# Patient Record
Sex: FEMALE | Race: WHITE | Employment: OTHER | URBAN - METROPOLITAN AREA
[De-identification: names, ages, dates, MRNs, and addresses within clinical notes are randomized per-mention and may not be internally consistent; named-entity substitution may affect disease eponyms.]

---

## 2021-06-14 NOTE — CONSULTS
TREATMENT LOCATION:   [] [facility] [place] [address]: [phone]  F: [phone] [x] [address]: [phone]  F: [phone]      Lymphedema Services - Initial Evaluation for Upper Extremity    Date:  2021  Patient: Susan Avendaño  : 1960             MRN: [id number]  Referring Physician: Lenin Garcia MD       Phone: [phone]  Fax: [phone]  Insurance:  Dominique Irma - 60 visits per calendar year she has 62 left combined with SLP/PT no copay, no auth required. Medical Diagnosis: Postmastectomy lymphedema syndrome  Rehab Codes: I89.0  Onset Date: 2021  Visit# / total visits: 1/10    Allergies:  [x] None    Medications: See charted information in Epic  Past Medical History: See charted information in Epic     Restrictions: No blood pressure/blood draw in: L U E  Fall Risk:   [x] No    [] Yes   If yes, intervention:       Overview: Patient is a 64year old female referred to the Lymphedema Clinic with a diagnosis of left Upper Extremity Lymphedema. Pt underwent mastectomy in  with 23 lymphnodes removed to the L UE. She states aprox 4 years ago her hand started to swell and she attended the [facility] lymphedema clinic. She states that she was educated on MLD and was fitted for a compression arm sleeve/ vest and glove from [employer]. It took aprox 1 year to get this fitted properly, however, it to still not fit right so she states she did not end up wearing it. She did have a compression arm sleeve that she would wear in the meantime. She was also set up with the CircAid reduction kit. She states that after this the arm was pretty close back to normal, however she got discouraged and did not wear the items as she should have and the arm increased back in size. She attempted to do therapy a second time at [facility] of which they review the MLD again, aprox 2 years ago.  She was sent up with a different compression velcro device - the Sigvaris compre Arm piece, and continued to order arm sleeves off of lymphedema products. Pt also seen a lymphedema therapist out in [place], [place] as well as Dr. Kaycee Ozuna as well as Dr. Doretha Quinones in [facility]- both of which recommended liposuction of the L UE to remove the solids and then LVA re-routing surgery of the lymph/ vascular system. Dr. Kaycee Ozuna - would like the patient to do lymphedema therapy and get the arm down as small as possible before having surgery. Pt arrives with stage 3 lymphedema of the L UE stating it has been present since. Did have lateral checked out in [place], of which they determined no edema at this area. Bear Harea Karon - 3 months- to tight at the wrist   Juzo - 7 months old - rolls down and digs down at the top, but fits good at the wrist   [address] - several options with the last being arox 4 months ago, feel that the circumscription is cute off at writs. PHYSICAL THERAPY NOTE- 10/21/2019 - Jim Sun is being referred to Physical Therapy for treatment of left upper extremity lymphedema. Patient reports the lymphedema has been present since 2017, with no specific injury to cause the swelling. Patient has a history of left breast cancer with mastectomy and ALND in August 2011. She states that use of a compression pump improves the lymphedema of the left arm. Rock Asa states she wears a compression sleeve during the day, but has trouble with wearing the compression on the hand while at work. She wears a sleeve on her left arm at night and reports she has increased hand swelling. Patient reports complete decongestive therapy with a lymphedema specialist has been done in the past to treat her swelling. She states she has not previously learned lymphatic drainage exercises.   [facility] reports she tried a ready-to-wear flat knit compression sleeve, but it was too tight at the wrist and she struggled to don the sleeve. Patient is right hand dominant. PLAN: Bandaging, Adjust Exercises, Long Term - talk different compression garments. Hx of Complete Decongestive Therapy:[x]Yes - Date: 2017, 2019       []No   Rate of onset:   [x] Slow   [] Rapid     [] Acute   Progression: [] Improving   []  Worsening  [x] Consistent   Conservative Treatments Utilized in the Past:  [] None  [x] Compression Garments   [x] Bandaging  [x] Elevation   [] Exercise  [x]Self MLD  [] Other/comments:      Current Lymphedmea Treatments:   [] None  [x] Compression Garments   [] Bandaging  [] Elevation  [] Exercise   []Self MLD  [] Other/comments:          Aggravating factors:  [] None   [x] Activity  [] Stress  [] Sleep Position [] Travel [x] Hot Temperatures [] Diet   []  Other/comments:    Relieving factors:   [] None [] Elevation  [] Activity  [x] Compression  [] Rest  [] Cold Temperatures [] Diet   []  Other/comments:    Comments:      Pain:  [] YES    [x] NO   Location:      Pain Rating: ( 0-10 scale) : 0/10  Comments:     History of Cancer: [x]Yes []No   Location:  L breast              Date of Diagnosis: 11/ 2011  Currently undergoing treatments at evaluation: []Yes  [x]No    Surgery: Yes   Node Dissection: Yes, # removed: 23   Chemotherapy: Yes, Completed   Radiation: Yes, Completed   Hormone Therapy: [x]Yes  []No    If so, tamoxifen?: [x]  - 5 years, 3 years Arimidex   Cancer Related Fatigue: mild   Is dominant extremity affected?  [x]Yes  []No  [] N/A     Absolute Lymphedema Contraindications - treatement [x] NONE    Absolute Contraindications Regarding the Deep Abdomen: [x] NONE   Relative Lymphedema Contraindications [x] NONE       ADL/IADL Previous level of function Current level of function Who currently assists the patient with task   Bathing  [x] Independent  [] Assist [x] Independent  [] Assist    Dress/grooming [x] Independent  [] Assist [x] Independent  [] Assist    Transfer/mobility [x] educates on an impaired lymphatic system vs. a healthy lymphatic system and how it relates to swelling and skin integrity. Pt is also educated, in more detail, on the purpose of lymphedema treatments and a POC that would be of benefit to them. This may include:     [x]Bandaging   [x]Self-Bandaging/Caregiver Training   [x]MLD    [x]Self-MLD   [x] Therapeutic Exercise    [x] Education/Instruction in home management program  [] Education and trial of a Vasopneumatic Pump    [x] Education and transition to long term management devices such as velcro compression garments and/or daytime compression sleeve/stocking(s)   [x]Discharge to Home Program     Response to treatment: Pt verbalizes and is agreeable to the instructions/ POC established at today's evaluation. Lymphedema Stage per ISL Guidelines    [] 0: Subclinical with no evidence on physical exam  [] 1:  Early onset, swelling/heaviness, pitting edema, subsides with limb elevation  [x] 2:  More advanced, fibrosis resulting in non-pitting edema, does not respond to elevation, thickening of the tissues  [] 3: Elephantiasis, pitting absent, huge limbs with dry/scaly, papillomatosis, hyperkeratosis, fluid may be leaking with recurrent cellulitis. Acanthosis (deep body folds). Elevation &   diuretics ineffective. Therapy Goals  STG - To be addressed within 3 visits    Pt will demonstrate compliance of maintaining lymphedema precautions to reduce the risks of infection and further exacerbations. Pt will demonstrate independence with decongestive exercise program in order to expedite fluid rerouting. LTG To be adressed within 8 visits     Pt will demonstrate competence with SELF-MLD ( manual lymphatic drainage) in order to reroute lymphatic pathways for decreased swelling. Pt/Caregiver will demonstrate independence with donning/doffing and wearing schedule for compression garments/ devices to maintain decreased size upon discharge.     Pt will demonstrate compliance with skin care routine for improved overall skin integrity and decreased risk of wounds and infection. Pt to compliant with CDT in order to reduce edema in the L UE by 3+ cm. Pt will demonstrate independence with after breast surgery stretches (corner stretch, butterfly stretch, side arm stretch) in order to increase ease with AROM. Patient's Goal: \" I would like to get my arm down in size, find something that works and hopefully get this surgery. \"     Response to Education Provided:  [x] Verbalized understanding   [] Demonstrates/verbalizes HEP/Education previously given      [] Needs continued review            [] No understanding   Learner(s): [x] Patient  [] Spouse [] Family  [] Other:   Method(s): [x] Verbal [] Demonstration [x] Handouts [] Exercise booklet   Family available to assist with home program if needed: [] Yes [x] No    FREQUENCY: Pt will be seen 2 x/ week for 9 visits and will follow up as appropriate. Focus is to remain on short and long term goals listed above. Rehabilitation Potential/Commitment: [x] Good [] Fair     [] Poor    Functional Assessment Used: Lymphedema Life Impact Scale (LLIS) Score: [x] Eval 34  %   [] 10th visit____  [] D/C ____     Clearance needed:  []Yes    [x]No     Physicians/specialties giving clearance:    Referral needed to: [] Physical Therapy   [] Speech Therapy                 Treatment Charges   Minutes   Units   Evaluation (09351)                                                $95.15 / $75.40            Low            Moderate 45 1          High     Manual Therapy (25982):                                      $26.92 / $21.34     Therapeutic activities (79552):                             $37.46/ $26.79     Therapeutic Exercise (72356)                              $29.21/$ 22.84     Self care/home mgmt (81711)                              $32.39 / $24.43 15 1   Other:      Total Treatment Time    60 2         Time In:  10:00  Time

## 2021-06-15 ENCOUNTER — HOSPITAL ENCOUNTER (OUTPATIENT)
Dept: OCCUPATIONAL THERAPY | Age: 61
Setting detail: THERAPIES SERIES
Discharge: HOME | End: 2021-06-15
Payer: COMMERCIAL

## 2021-06-15 PROCEDURE — 97535 SELF CARE MNGMENT TRAINING: CPT

## 2021-06-15 PROCEDURE — 97166 OT EVAL MOD COMPLEX 45 MIN: CPT

## 2021-06-29 ENCOUNTER — HOSPITAL ENCOUNTER (OUTPATIENT)
Dept: OCCUPATIONAL THERAPY | Age: 61
Setting detail: THERAPIES SERIES
Discharge: HOME | End: 2021-06-29
Payer: COMMERCIAL

## 2021-06-29 PROCEDURE — 97140 MANUAL THERAPY 1/> REGIONS: CPT

## 2021-06-29 PROCEDURE — 97535 SELF CARE MNGMENT TRAINING: CPT

## 2021-06-29 NOTE — FLOWSHEET NOTE
D2         D3        D4        D5          Dorsum   11 20.0 21.0   Wrist   16 16.4 19.5   Lower Forearm  25 19.3 25.4   Upper Forearm  34 25.5 32.0   Olecranon   40 25.4 30.0   Lower Bicep  48 29.0 33.5   Upper Bicep  57 32.0 34.5   Initial Total 6/15/2021:   167.6 205.4       Assessment :  [x] Progressing toward goals. Pt arrives to today's treatment stating that she has not noticed much of a change. Her arm continue to feel heavy and swollen even though she attempted to wear the Velcro compression pieces on occasion. Pt is educated on the following hand out and voices good understanding on how to complete:     Self-MLD Education     What is MLD?: MLD (or Manual Lymph Drainage) engages healthy lymph nodes & vessels in the body to create a suctioning effect to draw fluid away from full/affected areas. Additionally, we are teaching your lymphatic system to reroute fluid to healthy lymph vessels, which then drain into the venous system. Tips:   Stationary Circles: light pressure in working phase, hand should relax completely in resting phase (let your skin move your hand back to the original position at the beginning of the stretch). These are \"C\" shaped stretches and they should be completed SLOWLY. Please do not rush or MLD is not effective. Pressure applied = the amount of pressure you should apply when stroking a s head. Circles should be large enough to stretch the skin, but NOT slide over the skin. No rubbing or petting the skin. It is recommended that you complete your self-MLD 2x per day. You are welcome to complete more if you'd like. Skin on skin contact is best to achieve best stretch. It may be easiest for you to complete self-MLD while you are in bed or reclined. Therefore, you may want to complete in the AM after you wake up and in the PM before you go to bed. Self-MLD Pre-Treatment for Upper Extremity Lymphedema     1.  Stationary circles to clavicular lymph nodes x10 - located just above your collar bone      2. Deep breathing x5 - belly expands as you breathe in and deflates as you breathe out. Breathe in through the nose, out through the mouth. 3. Stationary circles over both inguinal lymph nodes x10 - located at the groin in the crease where your hip bends. 4. Stationary circles over  left axillary-inguinal pathway - connects your damaged axillary (armpit) lymph nodes to your inguinal (groin) lymph nodes. We are trying to send fluid AWAY from these damaged nodes down to the groin. Start at the bottom rib doing stationary circles until you reach your groin. Return to just above bottom rib and work your way back down to groin. Keep returning higher working your way back down to groin each time until you have reached your armpit. Each time you start over you should be one hand placement closer to armpit. 5. Stationary circles over right axillary (armpit) lymph nodes x10 - your NON-affected side. 6. Stationary circles from affected armpit to non-affected armpit left to right. We are trying to send fluid AWAY from these damaged nodes. Start half-way between armpits at mid-chest and do stationary circles over to right  armpit. Return to just past mid-chest and work your way back over to left armpit. Keep returning farther left past the mid-chest each time. Remember, we are working from damaged side to healthy side. OT bandages pt from the lake of the hand to the base of the arm with approx 50% pull to encourage lymphatic flow production and maintaining a reduced size. OT applies Eucerine lotion to the L UE only to include the arm and hand. Stockinette is applied over top of the arm as a protective barrier from the lotion. Rosidal foam is place on the arm as taut pressure followed by comprilan bandages from the lake of the hand to the base of the underarm with approx 50% pull.  Herringbone technique is used to progress up the arm and assist with lymphatic flow production and stimulate a suctioning effect. Post-treatment symptom assessment for swelling, heaviness, tightness to the affected limb, chest or truncal area:                             [] No change   [x] Improving   [x] Patient would continue to benefit from skilled occupational therapy services in order to address the following goals                Therapy Goals:   STG - To be addressed within 3 visits     Pt will demonstrate compliance of maintaining lymphedema precautions to reduce the risks of infection and further exacerbations.     Pt will demonstrate independence with decongestive exercise program in order to expedite fluid rerouting.        LTG To be adressed within 8 visits      Pt will demonstrate competence with SELF-MLD ( manual lymphatic drainage) in order to reroute lymphatic pathways for decreased swelling.     Pt/Caregiver will demonstrate independence with donning/doffing and wearing schedule for compression garments/ devices to maintain decreased size upon discharge.     Pt will demonstrate compliance with skin care routine for improved overall skin integrity and decreased risk of wounds and infection.       Pt to compliant with CDT in order to reduce edema in the L UE by 3+ cm.      Pt will demonstrate independence with after breast surgery stretches (corner stretch, butterfly stretch, side arm stretch) in order to increase ease with AROM.    Patient's Goal: \" I would like to get my arm down in size, find something that works and hopefully get this surgery.  \"     Response to Education Provided:  [x] Verbalized understanding   [x] Demonstrates/verbalizes understanding of home program/edu previously given     [] Needs continued review            [] No understanding   Learner(s): [x] Patient  [] Spouse [] Family [] Other:   Method(s): [x] Verbal [x] Demo [x] Handouts     Knowledge of home program:  [x] Good [x] Fair [] Poor [] With assist from family/caregiver      Plan:   [x] Continue current frequency toward long and short term goals. [] Specific Instructions for subsequent treatments:        Treatment Charges   Minutes   Units   Evaluation (17322)                                                $95.15 / $75.40            Low            Moderate            High     Manual Therapy (64666):                                      $26.92 / $21.34 2 25   Therapeutic activities (15613):                             $37.46/ $26.79     Therapeutic Exercise (45021)                              $29.21/$ 22.84     Self care/home mgmt (85137)                              $32.39 / $24.43 3 45   Other:      Total Treatment Time    5 70       Time In:  11:00  Time Out: 12:10      Electronically signed by Ania Ash OT on 6/29/2021 at 11:08 AM

## 2021-07-01 ENCOUNTER — HOSPITAL ENCOUNTER (OUTPATIENT)
Dept: OCCUPATIONAL THERAPY | Age: 61
Setting detail: THERAPIES SERIES
Discharge: HOME | End: 2021-07-01
Payer: COMMERCIAL

## 2021-07-01 PROCEDURE — 97535 SELF CARE MNGMENT TRAINING: CPT

## 2021-07-01 PROCEDURE — 97140 MANUAL THERAPY 1/> REGIONS: CPT

## 2021-07-01 NOTE — FLOWSHEET NOTE
TREATMENT LOCATION:   [] C/ [address]   [facility] [place] [address]: [phone]  F: [phone] [x] [address]: [phone]  F: [phone]        Lymphedema Services - Treatment Note of the Upper Extremity    Date:  2021  Patient: Christiano Gu  : 1960             MRN: [id number]  Referring Physician: Alexandra Mireles MD       Phone: [phone]  Fax: [phone]  Insurance:  [facility] - 60 visits per calendar year she has 54 left combined with SLP/PT no copay, no auth required. Medical Diagnosis: Postmastectomy lymphedema syndrome  Rehab Codes: I89.0  Onset Date: 2021  Visit# / total visits: 3/10    Plan for next session:   Follow up on SELF MLD, INITIATE NEXT MLD STEPS, FOLLOW UP ON BANDAGING. Absolute Lymphedema Contraindications - treatement [x]? NONE    Absolute Contraindications Regarding the Deep Abdomen: [x]? NONE   Relative Lymphedema Contraindications [x]? NONE      Subjective: \" The first night after bandaging it was painful but then that went away. My fingers got slightly swollen but it went away when I took the bandage off this morning. \"     Pain: [] YES    [x] NO     Location: 0      Pain Rating: ( 0-10 scale) : 0/10  Comments:     Objective:   [x] Measurement [x] Bandaging [] MLD [] Skin care   [] Education [] Self-bandaging [x] Self-MLD [] Wound Care   [] Exercise [] Caregiver training [] Kinesiotaping [] Other:    [] Nutrition [] Garment fitting/training [] Vasopneumatic Pump        Circumferential Measurements   Measurements taken from nail base of D3 digit. Fingers are measured at the base.    Measurements (cm) Right Left   D1        D2         D3        D4        D5          Dorsum   11 20.0 21.0   Wrist   16 16.4 19.0   Lower Forearm  25 19.3 24.5   Upper Forearm  34 25.5 31.0   Olecranon   40 25.4 29.5   Lower Bicep  48 29.0 32.5   Upper Bicep  57 32.0 33.0   Current: 2021    Initial Total 6/15/2021:   167.6 190.5    205.4       Assessment :  [x] Progressing toward goals. Pt arrives to today's treatment with no compression on. She states she removed in around 5:30 this morning. The edema appears to have reduced visually and pt states that she can also tell how it reduced in size a the arm is lighter. New measurements are taken and noted above. Pt brings with her today her velcro device that she has been using at home to help reduce the accumulated edema. OT assist pt in re-fitting the velcro item/ made adjustments accordingly. Pt states it feels a lot better to have on and     Pt is educated on the following hand out and voices good understanding on how to complete:     Pt reviews the follow exercises: Self-MLD Education     Self-MLD Pre-Treatment for Upper Extremity Lymphedema     1. Stationary circles to clavicular lymph nodes x10 - located just above your collar bone      2. Deep breathing x5 - belly expands as you breathe in and deflates as you breathe out. Breathe in through the nose, out through the mouth. 3. Stationary circles over both inguinal lymph nodes x10 - located at the groin in the crease where your hip bends. 4. Stationary circles over  left axillary-inguinal pathway - connects your damaged axillary (armpit) lymph nodes to your inguinal (groin) lymph nodes. We are trying to send fluid AWAY from these damaged nodes down to the groin. Start at the bottom rib doing stationary circles until you reach your groin. Return to just above bottom rib and work your way back down to groin. Keep returning higher working your way back down to groin each time until you have reached your armpit. Each time you start over you should be one hand placement closer to armpit. 5. Stationary circles over right axillary (armpit) lymph nodes x10 - your NON-affected side. 6. Stationary circles from affected armpit to non-affected armpit left to right.  We are trying to send fluid AWAY from these damaged nodes. Start residential between armpits at mid-chest and do stationary circles over to right  armpit. Return to just past mid-chest and work your way back over to left armpit. Keep returning farther left past the mid-chest each time. Remember, we are working from damaged side to healthy side. OT bandages pt from the lake of the hand to the base of the arm with approx 50% pull to encourage lymphatic flow production and maintaining a reduced size. OT applies Eucerine lotion to the L UE only to include the arm and hand. Stockinette is applied over top of the arm as a protective barrier from the lotion. Rosidal foam is place on the arm as taut pressure followed by comprilan bandages from the lake of the hand to the base of the underarm with approx 50% pull. Herringbone technique is used to progress up the arm and assist with lymphatic flow production and stimulate a suctioning effect.     Post-treatment symptom assessment for swelling, heaviness, tightness to the affected limb, chest or truncal area:                             [] No change   [x] Improving   [x] Patient would continue to benefit from skilled occupational therapy services in order to address the following goals                Therapy Goals:   STG - To be addressed within 3 visits     Pt will demonstrate compliance of maintaining lymphedema precautions to reduce the risks of infection and further exacerbations.     Pt will demonstrate independence with decongestive exercise program in order to expedite fluid rerouting.        LTG To be adressed within 8 visits      Pt will demonstrate competence with SELF-MLD ( manual lymphatic drainage) in order to reroute lymphatic pathways for decreased swelling.     Pt/Caregiver will demonstrate independence with donning/doffing and wearing schedule for compression garments/ devices to maintain decreased size upon discharge.     Pt will demonstrate compliance with skin care routine for improved overall skin integrity and decreased risk of wounds and infection.       Pt to compliant with CDT in order to reduce edema in the L UE by 3+ cm.      Pt will demonstrate independence with after breast surgery stretches (corner stretch, butterfly stretch, side arm stretch) in order to increase ease with AROM.    Patient's Goal: \" I would like to get my arm down in size, find something that works and hopefully get this surgery. \"     Response to Education Provided:  [x] Verbalized understanding   [x] Demonstrates/verbalizes understanding of home program/edu previously given     [] Needs continued review            [] No understanding   Learner(s): [x] Patient  [] Spouse [] Family [] Other:   Method(s): [x] Verbal [x] Demo [x] Handouts     Knowledge of home program:  [x] Good [x] Fair [] Poor [] With assist from family/caregiver      Plan:   [x] Continue current frequency toward long and short term goals. [] Specific Instructions for subsequent treatments:        Treatment Charges   Minutes   Units   Evaluation (59714)                                                $95.15 / $75.40            Low            Moderate            High     Manual Therapy (32317):                                      $26.92 / $21.34 2 35   Therapeutic activities (55917):                             $37.46/ $26.79     Therapeutic Exercise (54075)                              $29.21/$ 22.84     Self care/home mgmt (03877)                              $32.39 / $24.43 2 30   Other:      Total Treatment Time    4 65       Time In:  7:05  Time Out: 8:10      Electronically signed by Phil You OT on 7/1/2021 at 7:05 AM

## 2021-07-06 ENCOUNTER — HOSPITAL ENCOUNTER (OUTPATIENT)
Dept: OCCUPATIONAL THERAPY | Age: 61
Setting detail: THERAPIES SERIES
Discharge: HOME | End: 2021-07-06
Payer: COMMERCIAL

## 2021-07-06 PROCEDURE — 97140 MANUAL THERAPY 1/> REGIONS: CPT

## 2021-07-06 PROCEDURE — 97535 SELF CARE MNGMENT TRAINING: CPT

## 2021-07-06 NOTE — FLOWSHEET NOTE
TREATMENT LOCATION:   [] [address]   [facility] [place]: [phone]  F: [phone] [x] [address]: [phone]  F: [phone]        Lymphedema Services - Treatment Note of the Upper Extremity    Date:  2021  Patient: Archana Wild  : 1960             MRN: [id number]  Referring Physician: Saravanan Dowling MD       Phone: [phone]  Fax: [phone]  Insurance:  [facility] - 60 visits per calendar year she has 54 left combined with SLP/PT no copay, no auth required. Medical Diagnosis: Postmastectomy lymphedema syndrome  Rehab Codes: I89.0  Onset Date: 2021  Visit# / total visits: 4/10    Plan for next session:   Follow up on SELF MLD, INITIATE NEXT MLD STEPS, FOLLOW UP ON BANDAGING. Absolute Lymphedema Contraindications - treatement [x]? NONE    Absolute Contraindications Regarding the Deep Abdomen: [x]? NONE   Relative Lymphedema Contraindications [x]? NONE      Subjective: \" I took the bandages off on  night, the dorsum of the hand got pretty swollen and the fingers were slightly swollen but reduced again in size. I am just worried about it because it is not going down in size very good. \"     Pain: [] YES    [x] NO     Location: 0      Pain Rating: ( 0-10 scale) : 0/10  Comments:     Objective:   [x] Measurement [x] Bandaging [] MLD [] Skin care   [] Education [] Self-bandaging [x] Self-MLD [] Wound Care   [] Exercise [] Caregiver training [] Kinesiotaping [] Other:    [] Nutrition [] Garment fitting/training [] Vasopneumatic Pump        Circumferential Measurements   Measurements taken from nail base of D3 digit. Fingers are measured at the base.    Measurements (cm) Right Left   D1        D2         D3        D4        D5          Dorsum   11 20.0 21.7   Wrist   16 16.4 19.2   Lower Forearm  25 19.3 25.3   Upper Forearm  34 25.5 30.5   Olecranon   40 25.4 29.5   Lower Bicep  48 29.0 32.5   Upper Bicep  57 32.0 34.5   Current: 7/6/21 7/1/2021    Initial Total 6/15/2021:   X    X    167.6 193.2    190.5    205.4       Assessment :  [x] Progressing toward goals. Pt arrives to today's treatment with velcro device in place on the L UE ( arm piece only) and then the Gordy-[place] glove in place on the hand. She states she removed it on Sunday and followed up with wearing her velcro device until this time. New measurements are taken and noted above. OT spend time discussing various compression garments that would be of benefit to the patient for better hand containment. Pt state that she will think about it and then likely order the device. Pt is fitted for the appropriate size of the garment - SIZE MEDIUM and is agreeable with the decision. Manual lymphatic drainage initiated at the affected axilla working the subscapular lymph nodes and posterior inter-axillary pathway along with the anterior ventral inter-axillary pathway. Performed MLD to redirect and decrease congestion of lymphatic fluid. Educated patient on upper extremity MLD by starting proximally and working lymphatic fluid up towards the LN of axilla with light pressure for optimal lymphatic load movement. Patient to work proximal UE and proceed distally to hand then reworking fluid from hand to axilla following light pressure and proximal movement of lymphatic fluid to LN of axilla. Educated when bandages are doffed patient to complete self MLD, otherwise self MLD to be completed proximal to short stretch bandages. OT bandages pt from the lake of the hand to the base of the arm with approx 50% pull to encourage lymphatic flow production and maintaining a reduced size. OT applies Eucerine lotion to the L UE only to include the arm and hand. Stockinette is applied over top of the arm as a protective barrier from the lotion.  Rosidal foam is place on the arm as taut pressure followed by comprilan bandages from the lake of [place] [x] Handouts     Knowledge of home program:  [x] Good [x] Fair [] Poor [] With assist from family/caregiver      Plan:   [x] Continue current frequency toward long and short term goals. [] Specific Instructions for subsequent treatments:        Treatment Charges   Minutes   Units   Evaluation (99210)                                                $95.15 / $75.40            Low            Moderate            High     Manual Therapy (24360):                                      $26.92 / $21.34 15 1   Therapeutic activities (54477):                             $37.46/ $26.79     Therapeutic Exercise (98141)                              $29.21/$ 22.84     Self care/home mgmt (91134)                              $32.39 / $24.43 45 3   Other:      Total Treatment Time    60 4       Time In: 10:00 Time Out: 11:00      Electronically signed by Mino Rockwell OT on 7/6/2021 at 10:06 AM

## 2021-07-08 ENCOUNTER — HOSPITAL ENCOUNTER (OUTPATIENT)
Dept: OCCUPATIONAL THERAPY | Age: 61
Setting detail: THERAPIES SERIES
Discharge: HOME | End: 2021-07-08
Payer: COMMERCIAL

## 2021-07-08 PROCEDURE — 97535 SELF CARE MNGMENT TRAINING: CPT

## 2021-07-08 PROCEDURE — 97140 MANUAL THERAPY 1/> REGIONS: CPT

## 2021-07-08 NOTE — FLOWSHEET NOTE
TREATMENT LOCATION:   [] [address]   [facility] [place]: [phone]  F: [phone] [x] [address]: [phone]  F: [phone]        Lymphedema Services - Treatment Note of the Upper Extremity    Date:  2021  Patient: Janice Rendon  : 1960             MRN: [id number]     [facility]   Steffanie Granadosderrick [phone](U)     Coverage Information     F/O Payor/Plan Precert #   [employer] [place]    Subscriber Subscriber #   Lopez Morin [id number]   Address Phone   [address] [place] [id number]   [address] [phone]       Referring Physician: Cory Carter MD       Phone: [phone]  Fax: [phone]  Insurance:  [employer] - 60 visits per calendar year she has 54 left combined with SLP/PT no copay, no auth required. Medical Diagnosis: Postmastectomy lymphedema syndrome  Rehab Codes: I89.0  Onset Date: 2021  Visit# / total visits: 5/10    Plan for next session:   Follow up on SELF MLD, INITIATE NEXT MLD STEPS, FOLLOW UP ON BANDAGING. Absolute Lymphedema Contraindications - treatement [x]? NONE    Absolute Contraindications Regarding the Deep Abdomen: [x]? NONE   Relative Lymphedema Contraindications [x]? NONE      Subjective: \" I took the bandages off around 9:30, left it off for a couple hours. I also feel like the new finger bandaging style worked out well. They were really skinny. Pain: [] YES    [x] NO     Location: 0      Pain Rating: ( 0-10 scale) : 0/10  Comments:     Objective:   [x] Measurement [x] Bandaging [] MLD [] Skin care   [] Education [] Self-bandaging [x] Self-MLD [] Wound Care   [] Exercise [] Caregiver training [] Kinesiotaping [] Other:    [] Nutrition [] Garment fitting/training [] Vasopneumatic Pump        Circumferential Measurements   Measurements taken from nail base of D3 digit. Fingers are measured at the base.    Measurements (cm) Right Left   D1       D2         D3        D4        D5          Dorsum   11 20.0 20.8   Wrist   16 16.4 19.2   Lower Forearm  25 19.3 25.5   Upper Forearm  34 25.5 31.0   Olecranon   40 25.4 29.5   Lower Bicep  48 29.0 32.5   Upper Bicep  57 32.0 34.5   Current:     7/6/21 7/1/2021    Initial Total 6/15/2021:   X    X    X    167.6 193.0    193.2    190.5    205.4       Assessment :  [x] Progressing toward goals. Pt arrives to today's treatment with velcro device in place on the L UE ( arm piece only) and then the Gordy[place] glove in place on the hand. She states she removed the device around 9:30 this morning and followed up with wearing her velcro device until this time. She states that she left the device off for a few hours before putting it back on. New measurements are taken and noted above. OT makes the suggestion that pt not leave the device for for longer than 1 hour as she has minimal to no reduction in size, except for her hand. It is also suggested that pt don the device tighter. Education is completed on how to     Pt states that she has ordered the dorsal pocket glove after being fitted last session - SIZE MEDIUM, along with the dorsal pocket chip pad. OT bandages pt from the lake of the hand to the base of the arm with approx 50% pull to encourage lymphatic flow production and maintaining a reduced size. OT applies Eucerine lotion to the L UE only to include the arm and hand. Stockinette is applied over top of the arm as a protective barrier from the lotion. Rosidal foam is place on the arm as taut pressure followed by comprilan bandages from the lake of the hand to the base of the underarm with approx 50% pull. Herringbone technique is used to progress up the arm and assist with lymphatic flow production and stimulate a suctioning effect. New finger bandaging technique is utilized with a 6 cm and 4 cm bandage as pt states this worked really well last time to keep the fingers down in size. Post-treatment symptom assessment for swelling, heaviness, tightness to the affected limb, chest or truncal area:                             [] No change   [x] Improving   [x] Patient would continue to benefit from skilled occupational therapy services in order to address the following goals                Therapy Goals:   STG - To be addressed within 3 visits     Pt will demonstrate compliance of maintaining lymphedema precautions to reduce the risks of infection and further exacerbations.     Pt will demonstrate independence with decongestive exercise program in order to expedite fluid rerouting.        LTG To be adressed within 8 visits      Pt will demonstrate competence with SELF-MLD ( manual lymphatic drainage) in order to reroute lymphatic pathways for decreased swelling.     Pt/Caregiver will demonstrate independence with donning/doffing and wearing schedule for compression garments/ devices to maintain decreased size upon discharge.     Pt will demonstrate compliance with skin care routine for improved overall skin integrity and decreased risk of wounds and infection.       Pt to compliant with CDT in order to reduce edema in the L UE by 3+ cm.      Pt will demonstrate independence with after breast surgery stretches (corner stretch, butterfly stretch, side arm stretch) in order to increase ease with AROM.    Patient's Goal: \" I would like to get my arm down in size, find something that works and hopefully get this surgery. \"     Response to Education Provided:  [x] Verbalized understanding   [x] Demonstrates/verbalizes understanding of home program/edu previously given     [] Needs continued review            [] No understanding   Learner(s): [x] Patient  [] Spouse [] Family [] Other:   Method(s): [x] Verbal [x] Demo [x] Handouts     Knowledge of home program:  [x] Good [x] Fair [] Poor [] With assist from family/caregiver      Plan:   [x] Continue current frequency toward long and short term goals.

## 2021-07-13 ENCOUNTER — HOSPITAL ENCOUNTER (OUTPATIENT)
Dept: OCCUPATIONAL THERAPY | Age: 61
Setting detail: THERAPIES SERIES
Discharge: HOME | End: 2021-07-13
Payer: COMMERCIAL

## 2021-07-13 PROCEDURE — 97535 SELF CARE MNGMENT TRAINING: CPT

## 2021-07-13 NOTE — FLOWSHEET NOTE
TREATMENT LOCATION:   [] [address]   [facility] [place]: [phone]  F: [phone] [x] [address]: [phone]  F: [phone]        Lymphedema Services - Treatment Note of the Upper Extremity    Date:  2021  Patient: Pollo Parson  : 1960             MRN: [id number]  Referring Physician: Annetta Esparaz MD       Phone: [phone]  Fax: [phone]  Insurance:  [facility] - 60 visits per calendar year she has 54 left combined with SLP/PT no copay, no auth required. Medical Diagnosis: Postmastectomy lymphedema syndrome  Rehab Codes: I89.0  Onset Date: 2021  Visit# / total visits: 5/10    Plan for next session:   Follow up on SELF MLD, INITIATE NEXT MLD STEPS, FOLLOW UP ON BANDAGING. Absolute Lymphedema Contraindications - treatement [x]? NONE    Absolute Contraindications Regarding the Deep Abdomen: [x]? NONE   Relative Lymphedema Contraindications [x]? NONE      Subjective: \" I took the bandages off around 9:45, showered and then put my velcro device on instead. I was thinking I would like to try just my velcro device from today to Thursday apt, if it does not work, we can go back to the bandaging? \"    Pain: [] YES    [x] NO     Location: 0      Pain Rating: ( 0-10 scale) : 0/10  Comments:     Objective:   [x] Measurement [x] Bandaging [] MLD [] Skin care   [] Education [] Self-bandaging [x] Self-MLD [] Wound Care   [] Exercise [] Caregiver training [] Kinesiotaping [] Other:    [] Nutrition [x] Garment fitting/training [] Vasopneumatic Pump        Circumferential Measurements   Measurements taken from nail base of D3 digit. Fingers are measured at the base.    Measurements (cm) Right Left   D1        D2         D3        D4        D5          Dorsum   11 20.0 20.5   Wrist   16 16.4 18.3   Lower Forearm  25 19.3 24.2   Upper Forearm  34 25.5 29.5   Olecranon   40 25.4 29.4   Lower Bicep  48 29.0 31.8   Upper Bicep  57 32.0 33.0   Current:     7/6/21 7/1/2021    Initial Total 6/15/2021:   X    X    X    X    167.6 186.7    193.0    193.2    190.5    205.4       Assessment :  [x] Progressing toward goals. Pt arrives to today's treatment with velcro device in place on the L UE ( arm piece only) and then the [place] glove in place on the hand. Wearing scheduled noted above in Subject section. New measurements are taken and noted above. Pt states that she was working on getting the device donned tighter or which made a noticeable reduction in the size of the arm. Pt brought with her today the velcro hand piece and the measurement guide of which she states she would like future eduction on how to use. OT spends extra time on this topic or which the pt states better understanding. Pt states that she has ordered the dorsal pocket glove after being fitted last session - SIZE MEDIUM, along with the dorsal pocket chip pad that she is still waiting the arrival of. OT informs pt that she is able to wear this as an option instead of the gauntlet. Pt is agreeable to being fitted for a new velcro device of which OT assit in measuring and fitting the patient for. She selects the [address] after education on options and agreeable to the sizing selected.     Post-treatment symptom assessment for swelling, heaviness, tightness to the affected limb, chest or truncal area:                              [] No change   [x] Improving   [x] Patient would continue to benefit from skilled occupational therapy services in order to address the following goals                Therapy Goals:   STG - To be addressed within 3 visits     Pt will demonstrate compliance of maintaining lymphedema precautions to reduce the risks of infection and further exacerbations.     Pt will demonstrate independence with decongestive exercise program in order to expedite fluid rerouting.        LTG To be adressed within 8 visits      Pt will demonstrate competence with SELF-MLD ( manual lymphatic drainage) in order to reroute lymphatic pathways for decreased swelling.     Pt/Caregiver will demonstrate independence with donning/doffing and wearing schedule for compression garments/ devices to maintain decreased size upon discharge.     Pt will demonstrate compliance with skin care routine for improved overall skin integrity and decreased risk of wounds and infection.       Pt to compliant with CDT in order to reduce edema in the L UE by 3+ cm.      Pt will demonstrate independence with after breast surgery stretches (corner stretch, butterfly stretch, side arm stretch) in order to increase ease with AROM.    Patient's Goal: \" I would like to get my arm down in size, find something that works and hopefully get this surgery. \"     Response to Education Provided:  [x] Verbalized understanding   [x] Demonstrates/verbalizes understanding of home program/edu previously given     [] Needs continued review            [] No understanding   Learner(s): [x] Patient  [] Spouse [] Family [] Other:   Method(s): [x] Verbal [x] Demo [x] Handouts     Knowledge of home program:  [x] Good [x] Fair [] Poor [] With assist from family/caregiver    Plan:   [x] Continue current frequency toward long and short term goals. [] Specific Instructions for subsequent treatments:        Treatment Charges   Minutes   Units   Evaluation (21611)                                                $95.15 / $75.40            Low            Moderate            High     Manual Therapy (36647):                                      $26.92 / $21.34     Therapeutic activities (63988):                             $37.46/ $26.79     Therapeutic Exercise (10585)                              $29.21/$ 22.84     Self care/home mgmt (92637)                              $32.39 / $24.43 53 4   Other:      Total Treatment Time    53 4       Time In: 1:08 Time Out: 2:00      Electronically signed by Namita Ace OT on 7/13/2021 at 1:28 PM

## 2021-07-15 ENCOUNTER — HOSPITAL ENCOUNTER (OUTPATIENT)
Dept: OCCUPATIONAL THERAPY | Age: 61
Setting detail: THERAPIES SERIES
Discharge: HOME | End: 2021-07-15
Payer: COMMERCIAL

## 2021-07-15 PROCEDURE — 97535 SELF CARE MNGMENT TRAINING: CPT

## 2021-07-15 NOTE — FLOWSHEET NOTE
TREATMENT LOCATION:   [] [address]   [facility] [place] [facility]: [phone]  F: [phone] [x] [address]: [phone]  F: [phone]        Lymphedema Services - Treatment Note of the Upper Extremity    Date:  7/15/2021  Patient: Jolynn Orozco  : 1960             MRN: [id number]  Referring Physician: Cedrick Faria MD       Phone: [phone]  Fax: [phone]  Insurance:  [facility] - 60 visits per calendar year she has 54 left combined with SLP/PT no copay, no auth required. Medical Diagnosis: Postmastectomy lymphedema syndrome  Rehab Codes: I89.0  Onset Date: 2021  Visit# / total visits: 5/10    Plan for next session:   Follow up on SELF MLD, INITIATE NEXT MLD STEPS, FOLLOW UP ON BANDAGING. PUMP TRIAL     Absolute Lymphedema Contraindications - treatement [x]? NONE    Absolute Contraindications Regarding the Deep Abdomen: [x]? NONE   Relative Lymphedema Contraindications [x]? NONE      Subjective: \" I took my bandages off yesterday after I scratched the crease of my arm and caused a small cut. I was not sure if it should stay covered. \"     Pain: [] YES    [x] NO     Location: 0      Pain Rating: ( 0-10 scale) : 0/10  Comments:     Objective:   [x] Measurement [x] Bandaging [] MLD [] Skin care   [] Education [] Self-bandaging [x] Self-MLD [] Wound Care   [] Exercise [] Caregiver training [] Kinesiotaping [] Other:    [] Nutrition [x] Garment fitting/training [] Vasopneumatic Pump        Circumferential Measurements   Measurements taken from nail base of D3 digit. Fingers are measured at the base.    Measurements (cm) Right Left   D1        D2         D3        D4        D5          Dorsum   11 20.0 19.8   Wrist   16 16.4 19.0   Lower Forearm  25 19.3 26.0   Upper Forearm  34 25.5 31.0   Olecranon   40 25.4 30.0   Lower Bicep  48 29.0 32.5   Upper Bicep  57 32.0 33.5   Current: 7/13/21 7/8/21 7/6/21 7/1/2021    Initial Total 6/15/2021:   X    X    X    X    X    167.6 191.8    186.7    193.0    193.2    190.5    205.4       Assessment :  [x] Progressing toward goals. Pt arrives to today's treatment with NO velcro device in place on the L UE. Pt states that she removed the device after she caused a small cut in the crease of her arm from scratching the area. She states that she is nervous about getting an infection in this area and was not sure on what to do. Time is spent educauting the patient the area is to stay clean and dry. She is also educated that it is of benefit to keep the compression in place at all times. Pt     New measurements are taken and noted above. Pt states that when she doffs the device she noticed the arm had reduced in size, however it has since then went back up in size. OT reminds the pt that it is a beneficial thing for her to keep the times donned at all time for optimal results and if she were get a cut or irration she can put padding in this area. OT demonstrates with the use of an ABD pad that pt states felt more comfortable to have in place. Pt states that she is still waiting for the arrival of the dorsal pocket glove after being fitted last session - SIZE MEDIUM, along with the dorsal pocket chip pad that she is still waiting the arrival of. She also notes that she has not ordered a new velcro device yet. Pt is provided with the following hand out and educated on how to properly complete the sequence. left Upper Extremity Self-MLD Sequence    **Complete self-MLD pre-treatment prior to moving onto the arm.**    1. Stationary circles from shoulder to base of neck x5    2. Stationary circles on the outside of the upper arm from just above elbow to shoulder - x5    3. Stationary circles from the inside of your arm to the outside of your arm. Start just above the elbow and direct stationary circles to the outside of your arm.  Keep using this method as you work your way up the arm until you have reached just below armpit - x5 per section    4. Stationary circles on both sides of elbow - x5 on each side    5. Stationary circles on front of forearm and back of forearm working your way from wrist to elbow - x5 per side     6. Stationary circles on the back of the hand (palm optional) - x5    7. Stationary circles over top of all fingers at the same time or special technique for each finger individually - x5    Once you have made it to the end of the arm, work your back up the arm from steps 7 to 1. You only need to complete 1-2x instead of 5x. After you get back to the shoulder, re-work the pathway from left armpit to right armpit from the pre-treatment. Pt request to have fingers bandages today using a 4cm and 6 cm molleast guaze, followed by the CircAid Reduction kit. Post-treatment symptom assessment for swelling, heaviness, tightness to the affected limb, chest or truncal area:                              [] No change   [x] Improving   [x] Patient would continue to benefit from skilled occupational therapy services in order to address the following goals                Therapy Goals:   STG - To be addressed within 3 visits     Pt will demonstrate compliance of maintaining lymphedema precautions to reduce the risks of infection and further exacerbations.     Pt will demonstrate independence with decongestive exercise program in order to expedite fluid rerouting.        LTG To be adressed within 8 visits      Pt will demonstrate competence with SELF-MLD ( manual lymphatic drainage) in order to reroute lymphatic pathways for decreased swelling.     Pt/Caregiver will demonstrate independence with donning/doffing and wearing schedule for compression garments/ devices to maintain decreased size upon discharge.     Pt will demonstrate compliance with skin care routine for improved overall skin integrity and decreased risk of wounds and infection.    Pt to compliant with CDT in order to reduce edema in the L UE by 3+ cm.      Pt will demonstrate independence with after breast surgery stretches (corner stretch, butterfly stretch, side arm stretch) in order to increase ease with AROM.    Patient's Goal: \" I would like to get my arm down in size, find something that works and hopefully get this surgery. \"     Response to Education Provided:  [x] Verbalized understanding   [x] Demonstrates/verbalizes understanding of home program/edu previously given     [x] Needs continued review            [] No understanding   Learner(s): [x] Patient  [] Spouse [] Family [] Other:   Method(s): [x] Verbal [x] Demo [x] Handouts     Knowledge of home program:  [x] Good [x] Fair [] Poor [] With assist from family/caregiver    Plan:   [x] Continue current frequency toward long and short term goals. [] Specific Instructions for subsequent treatments:        Treatment Charges   Minutes   Units   Evaluation (32594)                                                $95.15 / $75.40            Low            Moderate            High     Manual Therapy (99401):                                      $26.92 / $21.34     Therapeutic activities (41717):                             $37.46/ $26.79     Therapeutic Exercise (08264)                              $29.21/$ 22.84     Self care/home mgmt (71821)                              $32.39 / $24.43 53 4   Other:      Total Treatment Time    53 4       Time In: 11:00  Time Out: 11:53      Electronically signed by Estefania Larios OT on 7/15/2021 at 11:09 AM

## 2021-07-20 ENCOUNTER — HOSPITAL ENCOUNTER (OUTPATIENT)
Dept: OCCUPATIONAL THERAPY | Age: 61
Setting detail: THERAPIES SERIES
Discharge: HOME | End: 2021-07-20
Payer: COMMERCIAL

## 2021-07-20 PROCEDURE — 97535 SELF CARE MNGMENT TRAINING: CPT

## 2021-07-20 PROCEDURE — 97140 MANUAL THERAPY 1/> REGIONS: CPT

## 2021-07-20 NOTE — FLOWSHEET NOTE
TREATMENT LOCATION:   [] [address]   [facility]: [phone]  F: [phone] [x] [address]: [phone]  F: [phone]        Lymphedema Services - Treatment Note of the Upper Extremity    Date:  2021  Patient: Shannan Vuong  : 1960             MRN: [id number]  Referring Physician: Stevo Del Rosario MD       Phone: [phone]  Fax: [phone]  Insurance:  [facility] - 60 visits per calendar year she has 54 left combined with SLP/PT no copay, no auth required. Medical Diagnosis: Postmastectomy lymphedema syndrome  Rehab Codes: I89.0  Onset Date: 2021  Visit# / total visits: 5/10    Plan for next session:   Determine need for additional compression items, provide with arm sleeve, PUMP TRIAL     Absolute Lymphedema Contraindications - treatement [x]? NONE    Absolute Contraindications Regarding the Deep Abdomen: [x]? NONE   Relative Lymphedema Contraindications [x]? NONE      Subjective: \" I took my finger bandages off yesterday, when I removed them my hand was hard and sore. I put my glove on and don't feel like it is doing much for me. I am concerned my hand is getting so big. \"     Pain: [] YES    [x] NO     Location: 0      Pain Rating: ( 0-10 scale) : 0/10  Comments:     Objective:   [x] Measurement [x] Bandaging [] MLD [] Skin care   [] Education [] Self-bandaging [x] Self-MLD [] Wound Care   [] Exercise [] Caregiver training [] Kinesiotaping [] Other:    [] Nutrition [x] Garment fitting/training [] Vasopneumatic Pump        Circumferential Measurements   Measurements taken from nail base of D3 digit. Fingers are measured at the base.    Measurements (cm) Right Left   D1        D2         D3        D4        D5          Dorsum   11 20.0 21.8   Wrist   16 16.4 20.0   Lower Forearm  25 19.3 24.7   Upper Forearm  34 25.5 28.7   Olecranon   40 25.4 28.0   Lower Bicep  48 29.0 31.0   Upper Bicep  57 32.0 33.0   Current:     7/13/21 7/8/21 7/6/21 7/1/2021    Initial Total 6/15/2021:   X    X    X    X    X    167.6 187.2    191.8    186.7    193.0    193.2    190.5    205.4       Assessment :  [x] Progressing toward goals. Pt arrives to today's treatment with velcro device in place on the L UE ( arm and hand piece). Pt states that she removed the finger bandages yesterday after they started looking dirty. She states that she donned her glove and velcro hand wrap afterward but the hand continues to swell. New measurements are taken and noted above. Pt states that she is still waiting for the arrival of the dorsal pocket glove after being fitted last session - [employer], along with the dorsal pocket chip pad of which she called the company and they informed her it would be delivered this week. OT discuses with pt of additional compression options, per her request of which she states that she would like to think about but also wait on at this time. Manual lymphatic drainage initiated at the affected axilla working the subscapular lymph nodes and posterior inter-axillary pathway along with the anterior ventral inter-axillary pathway. Performed MLD to redirect and decrease congestion of lymphatic fluid. Pt request to have fingers bandages today using a 4cm and 6 cm molleast guaze, with the hand itself being bandaged in short stretch compression bandages to encourage lymphatic flow. This is then followed by the CircAid Reduction kit. Prior to leaving my notes good fit of the devices. OT reminds pt on the importance of moving her hands while she has the compression in place. Brief discussion completed on rebounding, dry brushing initiation of exercises.      Post-treatment symptom assessment for swelling, heaviness, tightness to the affected limb, chest or truncal area:                            [] No change   [x] Improving   [x] Patient would continue to benefit from skilled occupational therapy services in order to address the following goals                Therapy Goals:   STG - To be addressed within 3 visits     Pt will demonstrate compliance of maintaining lymphedema precautions to reduce the risks of infection and further exacerbations.     Pt will demonstrate independence with decongestive exercise program in order to expedite fluid rerouting.        LTG To be adressed within 8 visits      Pt will demonstrate competence with SELF-MLD ( manual lymphatic drainage) in order to reroute lymphatic pathways for decreased swelling.     Pt/Caregiver will demonstrate independence with donning/doffing and wearing schedule for compression garments/ devices to maintain decreased size upon discharge.     Pt will demonstrate compliance with skin care routine for improved overall skin integrity and decreased risk of wounds and infection.       Pt to compliant with CDT in order to reduce edema in the L UE by 3+ cm.      Pt will demonstrate independence with after breast surgery stretches (corner stretch, butterfly stretch, side arm stretch) in order to increase ease with AROM.    Patient's Goal: \" I would like to get my arm down in size, find something that works and hopefully get this surgery. \"     Response to Education Provided:  [x] Verbalized understanding   [x] Demonstrates/verbalizes understanding of home program/edu previously given     [x] Needs continued review            [] No understanding   Learner(s): [x] Patient  [] Spouse [] Family [] Other:   Method(s): [x] Verbal [x] Demo [x] Handouts     Knowledge of home program:  [x] Good [x] Fair [] Poor [] With assist from family/caregiver    Plan:   [x] Continue current frequency toward long and short term goals.     [] Specific Instructions for subsequent treatments:        Treatment Charges   Minutes   Units   Evaluation (93198)                                                $95.15 / $75.40            Low            Moderate High     Manual Therapy (17348):                                      $26.92 / $21.34     Therapeutic activities (59218):                             $37.46/ $26.79     Therapeutic Exercise (06107)                              $29.21/$ 22.84     Self care/home mgmt (20883)                              $32.39 / $24.43 70 5   Other:      Total Treatment Time    70 5       Time In: 10:05 Time Out: 11:15      Electronically signed by Estefania Larios OT on 7/20/2021 at 10:08 AM

## 2021-07-20 NOTE — FLOWSHEET NOTE
[facility] CENTER and Therapy  [facility] [address], [address]  Phone: [phone]  Fax:     [phone]    [facility]  Lymphedema Therapy  Script for Compression Garments  2021    Niurka Blevins     : 1960     MRN: [id number]  Referring Physician: Clarisse Arroyo MD       Phone: [phone]  Fax: [phone]  Insurance: [employer]  Date of Evaluation: 6/15/21    ICD 10 Code:  [x] I89.0   Secondary lymphedema, not otherwise classified  [x] I97.2   Secondary lymphedema d/t postmastectomy  [x] C50.912 Malignant neoplasm of unspecified site of LEFT female breast.    Compression Items:   [x] Compression arm sleeve of patients choice  [x]Compression Glove    Quantity: Unlimited    Compression Level:  REGULAR: [x] 20-30 mmHg [] 30-40 mmHg [] 40-50 mmHg  CUSTOM :  [] 18- 21mmHg       [x] 23-32mmHg        [] 34-46 mmHg     Physician Name (Please Print): _______________________________________    Physician Signature: ________________________________________Date: ____________       Please print, sign and Fax paper copy to : [phone]

## 2021-07-22 ENCOUNTER — HOSPITAL ENCOUNTER (OUTPATIENT)
Dept: OCCUPATIONAL THERAPY | Age: 61
Setting detail: THERAPIES SERIES
Discharge: HOME | End: 2021-07-22
Payer: COMMERCIAL

## 2021-07-22 PROCEDURE — 97535 SELF CARE MNGMENT TRAINING: CPT

## 2021-07-22 PROCEDURE — 97140 MANUAL THERAPY 1/> REGIONS: CPT

## 2021-07-22 PROCEDURE — 97016 VASOPNEUMATIC DEVICE THERAPY: CPT

## 2021-07-22 NOTE — PROGRESS NOTES
TREATMENT LOCATION:   [] [address]   [facility] [place] [facility]: [phone]  F: [phone] [x] [address]: [phone]  F: [phone]        Lymphedema Services - Progress Note of the Upper Extremity    Date:  2021  Patient: Dawson Ramirez  : 1960             MRN: [id number]  Referring Physician: Ronald Haskins MD       Phone: [phone]  Fax: [phone]  Insurance:  [facility] - 60 visits per calendar year she has 54 left combined with SLP/PT no copay, no auth required. Medical Diagnosis: Postmastectomy lymphedema syndrome  Rehab Codes: I89.0  Onset Date: 2021  Visit# / total visits:   POC UPDATE DUE AT VISIT: 25    Plan for next session:   Determine need for additional compression items, provide with arm sleeve, PUMP TRIAL     Absolute Lymphedema Contraindications - treatement [x]? NONE    Absolute Contraindications Regarding the Deep Abdomen: [x]? NONE   Relative Lymphedema Contraindications [x]? NONE      Subjective: \" I took my finger bandages off yesterday, when I removed them my hand was hard and sore. I put my glove on and don't feel like it is doing much for me. I am concerned my hand is getting so big. \"     Pain: [] YES    [x] NO     Location: 0      Pain Rating: ( 0-10 scale) : 0/10  Comments:     Objective:   [x] Measurement [x] Bandaging [] MLD [] Skin care   [] Education [] Self-bandaging [x] Self-MLD [] Wound Care   [] Exercise [] Caregiver training [] Kinesiotaping [] Other:    [] Nutrition [x] Garment fitting/training [x] Vasopneumatic Pump        Circumferential Measurements   Measurements taken from nail base of D3 digit. Fingers are measured at the base.    Measurements (cm) Right Left   D1        D2         D3        D4        D5          Dorsum   11 20.0 21.5   Wrist   16 16.4 19.8   Lower Forearm  25 19.3 23.5   Upper Forearm  34 25.5 28.0   Olecranon   40 25.4 27.5   Lower Bicep  48 29.0 30.5   Upper Bicep  57 32.0 33.2   Current: 7/22/21    7/20/21    7/15/21    7/13/21    7/8/21    7/6/21    7/1/2021    Initial Total 6/15/2021:   X    X    X    X    X    X    X    167.6 184.0    187. 2    191.8    186.7    193.0    193.2    190.5    205.4       Assessment :  [x] Progressing toward goals. Pt arrives to today's treatment with velcro device in place on the L UE ( arm and hand piece) with Dowd Prude compression glove on under the velcro hand piece. She states that she took the bandage off around 9 am this morning but did not feel the hand was down in size. New measurements are taken and noted above. Overall the arm has softened and continues to reduce nicely in size. The hand however, remains fibrotic and edematous. Pt notes concern that she feels her hand will never get better. OT discusses other options of care for follow up with the hand to include: placing of foam padding under bandaging,and donning dorsal pocket glove and foam padding when it arrive. Pt is agreeable to both options. Pt states that she is still waiting for the arrival of the dorsal pocket glove after being fitted last session - [employer], along with the dorsal pocket chip pad of which she called the company again and they report it will be delivered tomorrow. Patient presents with stage 3 lymphedema secondary to cancer in L UE. Despite compliance with conservative treatments including: compression wrapping/20-30 mmhg compressions garments, regular diet, self-MLD, , elevation, and exercise for at least the past 4 years, the patient continues to present with uncontrolable edema. In addition, the basic pump has been used daily (30mmhg) over the past 4 years. In order to decrease symptoms and improve quality of life the advanced vasopneumatic compression pump is being recommended. This pump will safely and comfortably improve lymphatic flow in both the trunk and L UE.  The basic pump should be discontinued on arrival of the advanced pump due to ineffectiveness. OT follows up on discussion of the flat knit compression garments, showing her the difference between Juzo and Jobst garments. Pt is provided with a copy of the list of vendors as instructed in the hand out below. After the education pt states that she like both versions with cotton woven into them : Juzo Expert and Jobst Elvarex Soft. Pt states she will take more time to consider her options while we wait for the script to return from her reffering physician. Pt request to have fingers bandages today using a 4cm and 6 cm molleast guaze, with the hand itself being bandaged in short stretch compression bandages to encourage lymphatic flow. This is then followed by the CircAid Reduction kit. Prior to leaving my notes good fit of the devices. OT reminds pt on the importance of moving her hands while she has the compression in place. Brief discussion completed on rebounding, dry brushing initiation of exercises. Local Compression Vendor Options: All listed local vendors have the ability to provide measurements & will verify insurance benefits. They offer self-pay options as well. Location Phone/Fax Garment Types Available Additional [address]  [address] [facility]    P: [phone]  F: [phone] Sleeves, gloves, stockings, in-elastic garments, nighttime garments, velcro wraps for upper and lower extremity  Make appointment with [place] who can be reached at extension [phone].  This is the only [facility] location that offers this service.  Can accommodate custom orders   [facility] [place] [address]    Twyla Grayson [address] P: [phone]  F: [phone] Sleeves, gloves, stockings, nighttime garments, velcro wraps, compression bras, swell spots  Appointment only   Will NOT accept electronically signed scripts   Can accommodate custom orders   [employer]  [address] [place] [facility] [place] P: [phone]  F: [phone] Sleeves, gloves, stockings, velcro wraps (Janis Credit only), compression bras, swell spots, JoViPak for head/neck  Appointment only    Can accommodate custom orders   Out of network with: [employer]  [address], [address] P: [phone]  F: [phone] Custom compression sleeves, stockings, tops, pants, chinstraps, and face masks  Appointment only   Custom garments only       [employer]    Below are examples of trusted online vendors. Please confirm with your therapist before you order via a different online vendor. Not all brands found online are created equal. Please allow your therapist to help you make an appropriate & safe selection for your online order. Remember, when it comes to compression sleeves and stockings - you get what you pay for! Vendor [id number] y: [phone]  F: [phone]   Accepts most insurances   Self-pay  Therapist will assist with order   Payment plan option available if insurance does not cover   [employer] P: [phone]  F: [phone]    [email or web address]  Accepts some insurances   Self-pay  Can order online or via phone   Use code \"ANDCOH\" for 10% off self-pay order (online only)   [employer] P: [phone]    [email or web address]  Self-pay only  Can order online or via phone   Use code \"1256\" for 10% off (one time use)   Use \"8176\" for therapist referral code         Post-treatment symptom assessment for swelling, heaviness, tightness to the affected limb, chest or truncal area:                            [] No change   [x] Improving   [x] Patient would continue to benefit from skilled occupational therapy services in order to address the following goals                Therapy Goals:   STG - To be addressed within 3 visits     Pt will demonstrate compliance of maintaining lymphedema precautions to reduce the risks of infection and further exacerbations. - GOAL MET 7/22/21     Pt will demonstrate independence with decongestive exercise program in order to expedite fluid rerouting.- GOAL MET 7/22/21        LTG To be adressed within 8 visits      Pt will demonstrate competence with SELF-MLD ( manual lymphatic drainage) in order to reroute lymphatic pathways for decreased swelling. - GOAL MET 7/22/21     Pt/Caregiver will demonstrate independence with donning/doffing and wearing schedule for compression garments/ devices to maintain decreased size upon discharge. - ONGOING - 7/22/21     Pt will demonstrate compliance with skin care routine for improved overall skin integrity and decreased risk of wounds and infection. GOAL MET 7/22/21     Pt to compliant with CDT in order to reduce edema in the L UE by 3+ cm. - ONGOING- 7/22/21     Pt will demonstrate independence with after breast surgery stretches (corner stretch, butterfly stretch, side arm stretch) in order to increase ease with AROM. ONGOING 7/22/21     Patient's Goal: \" I would like to get my arm down in size, find something that works and hopefully get this surgery. \"     Response to Education Provided:  [x] Verbalized understanding   [x] Demonstrates/verbalizes understanding of home program/edu previously given     [x] Needs continued review            [] No understanding   Learner(s): [x] Patient  [] Spouse [] Family [] Other:   Method(s): [x] Verbal [x] Demo [x] Handouts     Knowledge of home program:  [x] Good [x] Fair [] Poor [] With assist from family/caregiver    FREQUENCY: Pt will be seen 2 x/ week for an addtional 9 visits and will follow up as appropriate. Focus is to remain on short and long term goals as listed above.      Treatment Charges   Minutes   Units   Evaluation (74668)                                                $95.15 / $75.40            Low            Moderate            High     Manual Therapy ([address] [facility]):                                      $26.92 / $21.34 17 1   Therapeutic activities (39443):                             $37.46/ $26.79     Therapeutic Exercise (11748)                              $29.21/$ 22.84     Self care/home mgmt (46608)                              $32.39 / $24.43 60 4   Other: Vasopneumatic Pump 15 1   Total Treatment Time    77 6       Time In: 11:00 Time Out: 12:17      Electronically signed by Liss Avila OT on 7/22/2021 at 11:05 AM

## 2021-07-29 ENCOUNTER — HOSPITAL ENCOUNTER (OUTPATIENT)
Dept: OCCUPATIONAL THERAPY | Age: 61
Setting detail: THERAPIES SERIES
Discharge: HOME | End: 2021-07-29
Payer: COMMERCIAL

## 2021-07-29 PROCEDURE — 97535 SELF CARE MNGMENT TRAINING: CPT

## 2021-07-29 PROCEDURE — 97140 MANUAL THERAPY 1/> REGIONS: CPT

## 2021-07-29 NOTE — PROGRESS NOTES
TREATMENT LOCATION:   [] [address]   [facility] [place]: [phone]  F: [phone] [x] [address]: [phone]  F: [phone]        Lymphedema Services - Progress Note of the Upper Extremity    Date:  2021  Patient: Braden Davis  : 1960             MRN: [id number]  Referring Physician: Maria Elena Freitas MD       Phone: [phone]  Fax: [phone]  Insurance:  [facility] - 60 visits per calendar year she has 54 left combined with SLP/PT no copay, no auth required. Medical Diagnosis: Postmastectomy lymphedema syndrome  Rehab Codes: I89.0  Onset Date: 2021  Visit# / total visits: 10/13  POC UPDATE DUE AT VISIT: 25    Plan for next session:   Determine need for additional compression items, provide with arm sleeve    Absolute Lymphedema Contraindications - treatement [x]? NONE    Absolute Contraindications Regarding the Deep Abdomen: [x]? NONE   Relative Lymphedema Contraindications [x]? NONE      Subjective: \" My hand might be down a little bit, but I feel like it is still swollen. Pain: [] YES    [x] NO     Location: 0      Pain Rating: ( 0-10 scale) : 0/10  Comments:     Objective:   [x] Measurement [x] Bandaging [] MLD [] Skin care   [] Education [] Self-bandaging [x] Self-MLD [] Wound Care   [] Exercise [] Caregiver training [] Kinesiotaping [] Other:    [] Nutrition [x] Garment fitting/training [x] Vasopneumatic Pump        Circumferential Measurements   Measurements taken from nail base of D3 digit. Fingers are measured at the base.    Measurements (cm) Right Left   D1        D2         D3        D4        D5          Dorsum   11 20.0 21.5   Wrist   16 16.4 19.7   Lower Forearm  25 19.3 24.7   Upper Forearm  34 25.5 28.0   Olecranon   40 25.4 28.3   Lower Bicep  48 29.0 31.0   Upper Bicep  57 32.0 34.0   Current: 7/22/21    7/20/21    7/15/21    7/13/21    721    Initial Total 6/15/2021:   X    X    X    X    X    X    X    167.6 187.7    184.0    187. 2    191.8    186.7    193.0    193.2    190.5    205.4       Assessment :  [x] Progressing toward goals. Pt arrives to today's treatment with velcro device in place on the L UE ( arm and hand piece) with Dowd Prude compression glove on as her hand piece. She states that she took the hand bandages off yesterday but is not certain they helped her hand. New measurements are taken and noted above. Overall the arm remains softened however it is up in size. The hand however, remains fibrotic and edematous. Pt has received the dorsal pocket glove, but states that it is to small and she is unable to get it on. After confirming the measurements this is in fact the correct size. OT demonstrates, using the pts hand on how to properly don and doff the device. Pt then demonstrates how to complete herself with independence and noting good understanding. OT follows up on discussion of the flat knit compression arm sleeves/gloves. After the education pt states that she like both versions with cotton woven into them : Juzo Expert and Jobst Elvarex Soft. Pt states she will take more time to consider her options while we wait for the script to return from her reffering physician. Pt also states that she would like to get her hand and arm down more in size prior to being fitted for these devices. Remainder of time is spent properly fitting the velcro arm device as well as discussing the options for the next device that would be of benefit for her. Pt states that she has changed her mind and is considering purchasing the compreflex arm piece instead and returning the item she has just ordered.      Post-treatment symptom assessment for swelling, heaviness, tightness to the affected limb, chest or truncal area:                            [] No change   [x] Improving   [x] Patient would continue to benefit from skilled occupational therapy services in order to address the following goals                Therapy Goals:   STG - To be addressed within 3 visits     Pt will demonstrate compliance of maintaining lymphedema precautions to reduce the risks of infection and further exacerbations. - GOAL MET 7/22/21     Pt will demonstrate independence with decongestive exercise program in order to expedite fluid rerouting.- GOAL MET 7/22/21        LTG To be adressed within 8 visits      Pt will demonstrate competence with SELF-MLD ( manual lymphatic drainage) in order to reroute lymphatic pathways for decreased swelling. - GOAL MET 7/22/21     Pt/Caregiver will demonstrate independence with donning/doffing and wearing schedule for compression garments/ devices to maintain decreased size upon discharge. - ONGOING - 7/22/21     Pt will demonstrate compliance with skin care routine for improved overall skin integrity and decreased risk of wounds and infection. GOAL MET 7/22/21     Pt to compliant with CDT in order to reduce edema in the L UE by 3+ cm. - ONGOING- 7/22/21     Pt will demonstrate independence with after breast surgery stretches (corner stretch, butterfly stretch, side arm stretch) in order to increase ease with AROM. ONGOING 7/22/21     Patient's Goal: \" I would like to get my arm down in size, find something that works and hopefully get this surgery. \"     Response to Education Provided:  [x] Verbalized understanding   [x] Demonstrates/verbalizes understanding of home program/edu previously given     [x] Needs continued review            [] No understanding   Learner(s): [x] Patient  [] Spouse [] Family [] Other:   Method(s): [x] Verbal [x] Demo [x] Handouts     Knowledge of home program:  [x] Good [x] Fair [] Poor [] With assist from family/caregiver    FREQUENCY: Pt will be seen 2 x/ week for an addtional 9 visits and will follow up as appropriate. Focus is to remain on short and long term goals as listed above.      Treatment Charges   Minutes Units   Evaluation (16388)                                                $95.15 / $75.40            Low            Moderate            High     Manual Therapy (93209):                                      $26.92 / $21.34 15 1   Therapeutic activities (43628):                             $37.46/ $26.79     Therapeutic Exercise (20591)                              $29.21/$ 22.84     Self care/home mgmt (95070)                              $32.39 / $24.43 40 3   Other: Vasopneumatic Pump     Total Treatment Time    55 4       Time In: 8:05 Time Out: 9:00      Electronically signed by Mack Pritchett OT on 7/29/2021 at 8:02 AM

## 2021-08-03 ENCOUNTER — HOSPITAL ENCOUNTER (OUTPATIENT)
Dept: OCCUPATIONAL THERAPY | Age: 61
Setting detail: THERAPIES SERIES
Discharge: HOME | End: 2021-08-03
Payer: COMMERCIAL

## 2021-08-03 PROCEDURE — 97140 MANUAL THERAPY 1/> REGIONS: CPT

## 2021-08-03 PROCEDURE — 97535 SELF CARE MNGMENT TRAINING: CPT

## 2021-08-05 ENCOUNTER — HOSPITAL ENCOUNTER (OUTPATIENT)
Dept: OCCUPATIONAL THERAPY | Age: 61
Setting detail: THERAPIES SERIES
Discharge: HOME | End: 2021-08-05
Payer: COMMERCIAL

## 2021-08-05 PROCEDURE — 97535 SELF CARE MNGMENT TRAINING: CPT

## 2021-08-05 PROCEDURE — 97140 MANUAL THERAPY 1/> REGIONS: CPT

## 2021-08-05 NOTE — FLOWSHEET NOTE
TREATMENT LOCATION:   [] [facility]: [phone]  F: [phone] [x] [address]: [phone]  F: [phone]        Lymphedema Services - Progress Note of the Upper Extremity    Date:  2021  Patient: Arnold Kearns  : 1960             MRN: [id number]  Referring Physician: Sukumar Hawthorne MD       Phone: [phone]  Fax: [phone]  Insurance:  [employer] - 60 visits per calendar year she has 54 left combined with SLP/PT no copay, no auth required. Medical Diagnosis: Postmastectomy lymphedema syndrome  Rehab Codes: I89.0  Onset Date: 2021  Visit# / total visits:   POC UPDATE DUE AT VISIT: 23    Plan for next session:   Determine need for additional compression items, provide with arm sleeve, MLD    Absolute Lymphedema Contraindications - treatement [x]? NONE    Absolute Contraindications Regarding the Deep Abdomen: [x]? NONE   Relative Lymphedema Contraindications [x]? NONE      Subjective: \" I think this worked better but my hand is still swollen. \"      Pain: [] YES    [x] NO     Location: 0      Pain Rating: ( 0-10 scale) : 0/10  Comments:     Objective:   [x] Measurement [x] Bandaging [] MLD [] Skin care   [] Education [] Self-bandaging [x] Self-MLD [] Wound Care   [] Exercise [] Caregiver training [] Kinesiotaping [] Other:    [] Nutrition [x] Garment fitting/training [x] Vasopneumatic Pump        Circumferential Measurements   Measurements taken from nail base of D3 digit. Fingers are measured at the base.    Measurements (cm) Right Left   D1        D2         D3        D4        D5          Dorsum   11 20.0 21.5   Wrist   16 16.4 20.5   Lower Forearm  25 19.3 22.5   Upper Forearm  34 25.5 27.5   Olecranon   40 25.4 27.5   Lower Bicep  48 29.0 31.0   Upper Bicep  57 32.0 34.2   Current: 8/5/21    8/3/21    7/29/21    7/22/21    7/20/21    7/15/21    7/13/21    7/8/21    7/6/21    7/1/2021    Initial Total 6/15/2021:   X    X    X    X    X    X    X    X    X    X    167.6 184.7    189.3    187.7    184.0    187. 2    191.8    186.7    193.0    193.2    190.5    205.4       Assessment :  [x] Progressing toward goals. Pt arrives to today's treatment with velcro device in place on the L UE and the Dorsal pocket compression glove with the gauntlet over top on as her hand piece. She states that she has trailed wearing the dorsal pocket compression glove in the evening hours only, with the gauntlet over top that seemed to help subside the pain in the evening hours. During the daytime she trails the arm sleeve and velcro pieces on the hand. Pt states that last she attempted to start putting the chip pad on her hand while using her vasopneuamtic pump that seemed to of helped as well. New measurements are taken and noted above. Overall the arm remains softened however it is up in size. The hand also appears to be softer today, yet still edematous. Manual lymphatic drainage initiated at the affected extremity working the length of the arm from proximal to distal and then reworking all areas. Performed MLD to redirect and decrease congestion of lymphatic fluid. Pt notes that the are feels slightly better upon finishing, however measurements have increased on the size of the hand. Post-treatment symptom assessment for swelling, heaviness, tightness to the affected limb, chest or truncal area:                            [] No change   [x] Improving   [x] Patient would continue to benefit from skilled occupational therapy services in order to address the following goals                Therapy Goals:   STG - To be addressed within 3 visits     Pt will demonstrate compliance of maintaining lymphedema precautions to reduce the risks of infection and further exacerbations. - GOAL MET 7/22/21     Pt will demonstrate independence with decongestive exercise program in order to expedite fluid rerouting.- GOAL MET 7/22/21        LTG To be adressed within 8 visits      Pt will demonstrate competence with SELF-MLD ( manual lymphatic drainage) in order to reroute lymphatic pathways for decreased swelling. - GOAL MET 7/22/21     Pt/Caregiver will demonstrate independence with donning/doffing and wearing schedule for compression garments/ devices to maintain decreased size upon discharge. - ONGOING - 7/22/21     Pt will demonstrate compliance with skin care routine for improved overall skin integrity and decreased risk of wounds and infection. GOAL MET 7/22/21     Pt to compliant with CDT in order to reduce edema in the L UE by 3+ cm. - ONGOING- 7/22/21     Pt will demonstrate independence with after breast surgery stretches (corner stretch, butterfly stretch, side arm stretch) in order to increase ease with AROM. ONGOING 7/22/21     Patient's Goal: \" I would like to get my arm down in size, find something that works and hopefully get this surgery. \"     Response to Education Provided:  [x] Verbalized understanding   [x] Demonstrates/verbalizes understanding of home program/edu previously given     [x] Needs continued review            [] No understanding   Learner(s): [x] Patient  [] Spouse [] Family [] Other:   Method(s): [x] Verbal [x] Demo [x] Handouts     Knowledge of home program:  [x] Good [x] Fair [] Poor [] With assist from family/caregiver    FREQUENCY: Pt will be seen 2 x/ week for an addtional 9 visits and will follow up as appropriate. Focus is to remain on short and long term goals as listed above.      Treatment Charges   Minutes   Units   Evaluation (27476)                                                $95.15 / $75.40            Low            Moderate            High     Manual Therapy (56974):                                      $26.92 / $21.34 15 1   Therapeutic activities (81665): $37.46/ $26.79     Therapeutic Exercise (78679)                              $29.21/$ 22.84     Self care/home mgmt (49483)                              $32.39 / $24.43 40 3   Other: Vasopneumatic Pump     Total Treatment Time    55 4       Time In: 9:00 Time Out: 9:55      Electronically signed by Sejal Sadler OT on 8/5/2021 at 9:06 AM

## 2021-08-10 ENCOUNTER — APPOINTMENT (OUTPATIENT)
Dept: OCCUPATIONAL THERAPY | Age: 61
End: 2021-08-10
Payer: COMMERCIAL

## 2021-08-12 ENCOUNTER — HOSPITAL ENCOUNTER (OUTPATIENT)
Dept: OCCUPATIONAL THERAPY | Age: 61
Setting detail: THERAPIES SERIES
Discharge: HOME | End: 2021-08-12
Payer: COMMERCIAL

## 2021-08-12 PROCEDURE — 97140 MANUAL THERAPY 1/> REGIONS: CPT

## 2021-08-12 PROCEDURE — 97535 SELF CARE MNGMENT TRAINING: CPT

## 2021-08-12 NOTE — FLOWSHEET NOTE
TREATMENT LOCATION:   [] [address]   [address] [place]: [phone]  F: [phone] [x] [address]: [phone]  F: [phone]        Lymphedema Services - Progress Note of the Upper Extremity    Date:  2021  Patient: Laura Hernadez  : 1960             MRN: [id number]  Referring Physician: Mary Jane Davidson MD       Phone: [phone]  Fax: [phone]  Insurance:  [employer] - 60 visits per calendar year she has 54 left combined with SLP/PT no copay, no auth required. Medical Diagnosis: Postmastectomy lymphedema syndrome  Rehab Codes: I89.0  Onset Date: 2021  Visit# / total visits:   POC UPDATE DUE AT VISIT: 20    Plan for next session:   Determine need for additional compression items, provide with arm sleeve, MLD    Absolute Lymphedema Contraindications - treatement [x]? NONE    Absolute Contraindications Regarding the Deep Abdomen: [x]? NONE   Relative Lymphedema Contraindications [x]? NONE      Subjective:. \" No changes with the hand, I did receive my new velcro device. \"       Pain: [] YES    [x] NO     Location: 0      Pain Rating: ( 0-10 scale) : 0/10  Comments:       Objective:   [x] Measurement [] Bandaging [x] MLD [] Skin care   [] Education [] Self-bandaging [] Self-MLD [] Wound Care   [] Exercise [] Caregiver training [] Kinesiotaping [] Other:    [] Nutrition [x] Garment fitting/training [] Vasopneumatic Pump        Circumferential Measurements   Measurements taken from nail base of D3 digit. Fingers are measured at the base.    Measurements (cm) Right Left   D1        D2         D3        D4        D5          Dorsum   11 20.0 21.5   Wrist   16 16.4 20.5   Lower Forearm  25 19.3 22.7   Upper Forearm  34 25.5 28.3   Olecranon   40 25.4 28.5   Lower Bicep  48 29.0 31.0   Upper Bicep  57 32.0 34.6   Current: 8/5/21    8/3/21    7/29/21    7/22/21    7/20/21    7/15/21    7/13/21    7/8/21    7/6/21    7/1/2021    Initial Total 6/15/2021:   X    X    X    X    X    X    X    X    X    X    167.6 184.7    189.3    187.7    184.0    187. 2    191.8    186.7    193.0    193.2    190.5    205.4       Assessment :  [x] Progressing toward goals. Pt arrives to today's treatment with velcro device in place on the L UE and the gauntl with bermudez soto glove in place for her hand. She states she continues to trail various way ofs wearing the dorsal pocket compression glove in the evening hours only, with the gauntlet over top that seemed to help subside the pain in the evening hours. During the daytime she trails the arm sleeve and velcro pieces on the hand. Pt states that last she attempted to start putting the chip pad on her hand while using her vasopneuamtic pump that seemed to of helped as well. She also notes that the pump vendor arrived out to her home and assisted in setting her up for the advanced pump, of which she is waiting to use. New measurements are taken and noted above. Overall the arm remains softened however it is up in size. The hand continues to remain edematous and slightly fibrotic through out and up to the wrist.     Manual lymphatic drainage initiated at the affected extremity working the length of the arm from proximal to distal and then reworking all areas. AAA and BENJAMIN pathway also opened with sternal and spinal node pumps completed. Performed MLD to redirect and decrease congestion of lymphatic fluid. Pt notes that the are feels slightly better upon finishing, however measurements have increased on the size of the hand. Pt trails on her new velcro device today which appears to be of good fit size wise, however the length sits to long. Pt states she is going to exchange the item for a shorter size.      Post-treatment symptom assessment for swelling, heaviness, tightness to the affected limb, chest addtional 9 visits and will follow up as appropriate. Focus is to remain on short and long term goals as listed above.      Treatment Charges   Minutes   Units   Evaluation (77775)                                                $95.15 / $75.40            Low            Moderate            High     Manual Therapy (58871):                                      $26.92 / $21.34 30 2   Therapeutic activities (72692):                             $37.46/ $26.79     Therapeutic Exercise (52388)                              $29.21/$ 22.84     Self care/home mgmt (89401)                              $32.39 / $24.43 40 3   Other: Vasopneumatic Pump     Total Treatment Time    70 5       Time In: 9:00 Time Out: 10:10      Electronically signed by Ni Zuniga OT on 8/12/2021 at 9:10 AM

## 2021-08-24 ENCOUNTER — HOSPITAL ENCOUNTER (OUTPATIENT)
Dept: OCCUPATIONAL THERAPY | Age: 61
Setting detail: THERAPIES SERIES
Discharge: HOME | End: 2021-08-24
Payer: COMMERCIAL

## 2021-08-24 PROCEDURE — 97535 SELF CARE MNGMENT TRAINING: CPT

## 2021-08-24 NOTE — FLOWSHEET NOTE
8/5/21    8/3/21    7/29/21    7/22/21    7/20/21    7/15/21    7/13/21    7/8/21    7/6/21    7/1/2021    Initial Total 6/15/2021:   X    X    X    X    X    X    X    X    X    X    167.6 184.7    189.3    187.7    184.0    187. 2    191.8    186.7    193.0    193.2    190.5    205.4       Assessment :  [x] Progressing toward goals. Pt arrives to today's treatment with arm sleeve in place on the L UE and the gauntlet in place for her hand. New measurements are taken showing a small reduction in the hand and the wrist , but an increase in the size of the arm. During the daytime, pt notes that she is trailing the arm sleeve and velcro pieces on the hand, but also alternating in the velro arm piece. She states she is putting the chip pad on her hand while using her vasopneuamtic pump, which has been somewhat helpful, however it will not stay down. She states that she is attempting to use her vasopneumatic pump as often as possible. New measurements are taken and noted above. Pt trails on her new velcro device that is the short length, but also has the long length with her. After further evaluation pt is agreeable to keep the long length as she feels it is better coverage. Pt is last provided with a orange foam chip pad, with channels cut in to begin wearing on the dorsum of the hand, with tg  over top. Pt trails in clinic and notes that it feels good. Pt to follow up and a referral to PT is placed in attempts to break up any blockage in the chest wall that may be causing fluid to back flow.      Post-treatment symptom assessment for swelling, heaviness, tightness to the affected limb, chest or truncal area:                            [] No change   [x] Improving   [x] Patient would continue to benefit from skilled occupational therapy services in order to address the following goals                Therapy Goals:   STG - To be addressed within 3 visits     Pt will demonstrate compliance of maintaining $26.92 / $21.34     Therapeutic activities (75720):                             $37.46/ $26.79     Therapeutic Exercise (18086)                              $29.21/$ 22.84     Self care/home mgmt (54193)                              $32.39 / $24.43 60 4   Other: Vasopneumatic Pump     Total Treatment Time    60 4       Time In: 8:00 Time Out: 9:00      Electronically signed by Skye Cadena OT on 8/24/2021 at 8:01 AM

## 2021-09-14 ENCOUNTER — APPOINTMENT (OUTPATIENT)
Dept: OCCUPATIONAL THERAPY | Age: 61
End: 2021-09-14
Payer: COMMERCIAL

## 2021-09-21 ENCOUNTER — HOSPITAL ENCOUNTER (OUTPATIENT)
Dept: OCCUPATIONAL THERAPY | Age: 61
Setting detail: THERAPIES SERIES
Discharge: HOME | End: 2021-09-21
Payer: COMMERCIAL

## 2021-09-21 PROCEDURE — 97535 SELF CARE MNGMENT TRAINING: CPT

## 2021-09-21 NOTE — FLOWSHEET NOTE
TREATMENT LOCATION:   [] [address]: [phone]  F: [phone] [x] [address]: [phone]  F: [phone]        Lymphedema Services - Progress Note of the Upper Extremity    Date:  2021  Patient: Niurka Blevins  : 1960             MRN: [id number]  Referring Physician: Clarisse Arroyo MD       Phone: [phone]  Fax: [phone]  Insurance:  [facility] - 60 visits per calendar year she has 54 left combined with SLP/PT no copay, no auth required. Medical Diagnosis: Postmastectomy lymphedema syndrome  Rehab Codes: I89.0  Onset Date: 2021   Visit# / total visits: 14/15  POC UPDATE DUE AT VISIT: 21    Plan for next session:   Determine need for additional compression items, provide with arm sleeve, MLD    Absolute Lymphedema Contraindications - treatement [x]? NONE    Absolute Contraindications Regarding the Deep Abdomen: [x]? NONE   Relative Lymphedema Contraindications [x]? NONE      Subjective:. \" I feel my arm is better again by these changes, up but I am still not sure about my hand. \"       Pain: [] YES    [x] NO     Location: 0      Pain Rating: ( 0-10 scale) : 0/10  Comments:       Objective:   [x] Measurement [] Bandaging [x] MLD [] Skin care   [] Education [] Self-bandaging [] Self-MLD [] Wound Care   [] Exercise [] Caregiver training [] Kinesiotaping [] Other:    [] Nutrition [x] Garment fitting/training [] Vasopneumatic Pump        Circumferential Measurements   Measurements taken from nail base of D3 digit. Fingers are measured at the base.    Measurements (cm) Right Left   D1        D2         D3        D4        D5          Dorsum   11 20.0 21.5   Wrist   16 16.4 19.0   Lower Forearm  25 19.3 21.5   Upper Forearm  34 25.5 28.0   Olecranon   40 25.4 28.0   Lower Bicep  48 29.0 31.0   Upper Bicep  57 32.0 31.5   Current: 21 8/5/21    8/3/21    7/29/21    7/22/21    7/20/21    7/15/21    7/13/21    7/8/21    7/6/21    7/1/2021    Initial Total 6/15/2021:   X    X    X    X    X    X    X    X    X    X    X    167.6 180.5    184.7    189.3    187.7    184.0    187. 2    191.8    186.7    193.0    193.2    190.5    205.4       Assessment :  [x] Progressing toward goals. Pt arrives to today's treatment with arm sleeve in place on the L UE, with the Velcro device over top of it and the gauntlet in place for her hand. She states that she feels her arm is doing even better than it previously was, however she continues to get pockets of swelling. Pt states that she continues to alternate between the velcro devices, with the use of tg  underneath and the arm sleeves with the gauntlet. New measurements are taken and noted above. Pt reviews and is re-ducated no various compression garments that would be of benefit to her to purchase in the future for better containment of her edema. OT educated on the difference between flat knit and circular knit garments. She states that she would like to move forward with purchasing a circular knit compression arm sleeve while she awaits for her arm / hand to reduce more in size. Pt selects the following and measurements were taken for new arm sleeve: Pt selects the [address] ( Although it is not recommended for her stage of lymphedema, she is agreeable to move forward) SIZE: IV, REGULAR. LENGTH: REGULAR.     Pt is to start PT services on Monday 9/27/21    Post-treatment symptom assessment for swelling, heaviness, tightness to the affected limb, chest or truncal area:                            [] No change   [x] Improving   [x] Patient would continue to benefit from skilled occupational therapy services in order to address the following goals                Therapy Goals:   STG - To be addressed within 3 visits     Pt will demonstrate compliance of maintaining lymphedema precautions to reduce the risks of infection and further exacerbations. - GOAL MET 7/22/21     Pt will demonstrate independence with decongestive exercise program in order to expedite fluid rerouting.- GOAL MET 7/22/21        LTG To be adressed within 8 visits      Pt will demonstrate competence with SELF-MLD ( manual lymphatic drainage) in order to reroute lymphatic pathways for decreased swelling. - GOAL MET 7/22/21     Pt/Caregiver will demonstrate independence with donning/doffing and wearing schedule for compression garments/ devices to maintain decreased size upon discharge. - ONGOING - 7/22/21     Pt will demonstrate compliance with skin care routine for improved overall skin integrity and decreased risk of wounds and infection. GOAL MET 7/22/21     Pt to compliant with CDT in order to reduce edema in the L UE by 3+ cm. - ONGOING- 7/22/21     Pt will demonstrate independence with after breast surgery stretches (corner stretch, butterfly stretch, side arm stretch) in order to increase ease with AROM. ONGOING 7/22/21     Patient's Goal: \" I would like to get my arm down in size, find something that works and hopefully get this surgery. \"     Response to Education Provided:  [x] Verbalized understanding   [x] Demonstrates/verbalizes understanding of home program/edu previously given     [x] Needs continued review            [] No understanding   Learner(s): [x] Patient  [] Spouse [] Family [] Other:   Method(s): [x] Verbal [x] Demo [x] Handouts     Knowledge of home program:  [x] Good [x] Fair [] Poor [] With assist from family/caregiver    FREQUENCY: Pt will be seen 2 x/ week for an addtional 9 visits and will follow up as appropriate. Focus is to remain on short and long term goals as listed above.      Treatment Charges   Minutes   Units   Evaluation (77219)                                                $95.15 / $75.40            Low            Moderate            High     Manual Therapy (93299): $26.92 / $21.34     Therapeutic activities (11001):                             $37.46/ $26.79     Therapeutic Exercise (63926)                              $29.21/$ 22.84     Self care/home mgmt (68735)                              $32.39 / $24.43 57 4   Other: Vasopneumatic Pump     Total Treatment Time    57 4       Time In: 7:03 Time Out: 8:00      Electronically signed by Mino Rockwell OT on 9/21/2021 at 7:05 AM

## 2021-09-27 ENCOUNTER — HOSPITAL ENCOUNTER (OUTPATIENT)
Dept: PHYSICAL THERAPY | Facility: CLINIC | Age: 61
Setting detail: THERAPIES SERIES
Discharge: HOME | End: 2021-09-27
Payer: COMMERCIAL

## 2021-09-27 PROCEDURE — 97140 MANUAL THERAPY 1/> REGIONS: CPT

## 2021-09-27 PROCEDURE — 97110 THERAPEUTIC EXERCISES: CPT

## 2021-09-27 PROCEDURE — 97161 PT EVAL LOW COMPLEX 20 MIN: CPT

## 2021-09-27 NOTE — CONSULTS
Chemo  Surgery- L mastectomy in 10/25/2011     PMHx: [] Unremarkable [] Diabetes [x] HTN  [] Pacemaker   [] MI/Heart Problems [] Cancer [x] Arthritis [x] Other:hysterectoy 8/24/2011              [x] Refer to full medical chart  In EPIC     Medications: [x] Refer to full medical record [] None [] Other:  Allergies:      [x] Refer to full medical record [] None [] Other:    Function:  Hand Dominance  [x] Right  [] Left    Pain:  [x] Yes  [] No Location: chest Pain Rating: (0-10 scale) 0-1/10  Pain altered Tx:  [] Yes  [x] No  Action:  Sleep: [] OK    [x] Disturbed-wrappd at night    Patient lives with:    Employer    Job Status []  Normal duty   [] Light duty   [] Off due to condition    [x]  Retired   [] Not employed   [] Disability  [] Other:  []  Return to work: Work activities/duties gardening         TEST INITIAL DATE DISCHARGE DATE: VISIT#   6 MIN WALK Baseline/end of test Baseline/end of test   HR (bpm)     SpO2     dyspnea     fatigue     Distance (ft)     Blood Pressure     Brief Fatigue Inventory  (0: none, 1-3: mild, 4-6: moderate,   7-10: severe) 2    Functional Test     UEFS-100%      Objective:     ROM  °A/P END FEEL STRENGTH    Left Right  Left Right   Shld Flex 121 tight axilla, pec 135  4 4+    Shld Abd 64 151  4+ 5    Shld IR T12 T12  4+ 5   ER @ 0 45  90 74 82  4+ 5   Elbow flex    5 5   ext    5 5                       14.5 lbs 37.0 lbs. OBSERVATION No Deficit Deficit Not Tested Comments   Forward Head [] [x] []    Rounded Shoulders [] [x] []    Kyphosis [] [] []    Scap Height/Position [] [] []    Winging [] [] []    SH Rhythm [] [] []    INSPECTION/PALPATION    Decr scar mobility laterally>medially.  Sig muscular tightness pec, subscap, serratus, latts, teres, tricep, bicep and post shoulder musculature    SC/AC Joint [] [] []    Supraspinatus [] [x] []    Biceps tendon/groove [] [x] []    Posterior shld [] [x] []    Subscapularis [] [x] []        FUNCTION Normal Difficult Unable Overhead reach [] [x] []   Underarm reach  [] [] []   Groom/Dress [] [] []   Bra/Shirt tuck [] [x] []   Lift/Carry [] [x] []    [] [] []       Measurements Upper Extremity- see lymphedema note  [x]Heaviness in arm? []Numbness or tingling in chest/arm? [x] Swelling in chest, armpit, arm? [x]Discomfort with clothing? Marks from tightness? Stemmers Sign (middle finger or base of second toe)(edema concerns)  [] Negative - able to pull up on skin  [x]Positive - unable to pull up on skin    Assessment:  Problems:    [x] ? Pain/tightness L UE  [x] ? ROM: L UE  [x] ? Strength: B shoulders, L , posture/core stability  [x] ? Function: dist sleep  [] Other:    STG: (to be met in 12 treatments)  1. ? Pain: Stabilize shoulder pain and ensure optimal management of pain during all ADL's (home, occupation, community and recreation)  2. Optimize AROM of bilateral shoulders for functional activity to improve QOL. 3. Increase strength in bilateral shoulders and scapula to grossly 5/5 and L  by 3+ lbs for good postural stability and functional tasks  4. Independent with Home Exercise Programs  5. Education on signs and symptoms, precautions regarding lymphedema. LTG: (to be met in 18 treatments)  1. Pt to report improved sleep  2. Decr BFI score by 1 for improved jaydon to activity and overall increased function  3. Continue activity to decrease risk factors associated with increased time being sedentary while       undergoing chemotherapy, radiation, surgery. 4.   Improve tissue mobility of chest wall and axilla to allow for more normal motion, function and             lymphatic mobility and drainage  5.    Control/mitigate side effects/late effects of Cancer treatment                   Patient goals: reduction in arm size    Rehab Potential:  [x] Good  [] Fair  [] Poor   Suggested Professional Referral:  [x] No  [] Yes:  Barriers to Goal Achievement:  [x] No  [] Yes:  Domestic Concerns:  [x] No  [] Yes: Pt. Education:  [x] Plans/Goals, Risks/Benefits discussed  [x] Home exercise program  Method of Education: [x] Verbal  [x] Demo  [x] Written  Comprehension of Education:  [x] Verbalizes understanding. [] Demonstrates understanding. [] Needs Review. [] Demonstrates/verbalizes understanding of HEP/Ed previously given. Treatment Plan:  [x] Therapeutic Exercise   49597  [] Iontophoresis: 4 mg/mL Dexamethasone Sodium Phosphate  mAmin  30967   [x] Therapeutic Activity  58566 [] Vasopneumatic cold with compression  28075    [] Gait Training   73485 [] Ultrasound   78623   [] Neuromuscular Re-education  03517 [] Electrical Stimulation Unattended  76775   [x] Manual Therapy  23480 [] Electrical Stimulation Attended  10353   [x] Instruction in HEP  [x] Dry Needling   [] Aquatic Therapy   67278 [] Cold/hotpack    [] Massage   71657      [] Lumbar/Cervical Traction  83645       Frequency: 1-2 x/week for 18 visits    Todays Treatment:  Modalities:   Precautions:  Exercises:  Exercise Reps/ Time Weight/ Level Comments             Diaphragmatic breathing 5 cycles     Elbow winging supine 1m     Wand flexion  10x     Bye, bye ex 3x           Scapular retraction                        Other[de-identified] Instructed pt in therex per log, proper posture and breathing ex.  Issued HO for HEP    Specific Instructions for next treatment: Initiate PORI breast protocol, progress ROM, strength as jaydon      Evaluation Complexity:  History (Personal factors, comorbidities) [] 0 [x] 1-2 [] 3+   Exam (limitations, restrictions) [x] 1-2 [] 3 [] 4+   Clinical presentation (progression) [x] Stable [] Evolving  [] Unstable   Decision Making [x] Low [] Moderate [] High    [x] Low Complexity [] Moderate Complexity [] High Complexity       Treatment Charges: Mins Units   [x] Evaluation       []  Low       []  Moderate       []  High 20 1   []  Modalities     [x]  Ther Exercise 15 1   [x]  Manual Therapy 20 1   []  Ther Activities     [] Aquatics     []  Vasocompression     []  Other       TOTAL TREATMENT TIME: 55    Time in: 230 pm    Time Out: 3:30 pm    Electronically signed by: Dipak Street PT, NEELAM        Physician Signature:________________________________Date:__________________  By signing above or cosigning this note, I have reviewed this plan of care and certify a need for medically necessary rehabilitation services.      *PLEASE SIGN ABOVE AND FAX BACK ALL PAGES*

## 2021-09-30 ENCOUNTER — HOSPITAL ENCOUNTER (OUTPATIENT)
Dept: PHYSICAL THERAPY | Facility: CLINIC | Age: 61
Setting detail: THERAPIES SERIES
Discharge: HOME | End: 2021-09-30
Payer: COMMERCIAL

## 2021-09-30 PROCEDURE — 97140 MANUAL THERAPY 1/> REGIONS: CPT

## 2021-09-30 PROCEDURE — 97110 THERAPEUTIC EXERCISES: CPT

## 2021-09-30 NOTE — FLOWSHEET NOTE
[] [facility]  [address]  [phone]  F: [phone] [] [address]  [facility]   [address]  P: [phone]  F: [phone] [x] [address] &  Therapy  [address]  P: [phone]  F: [phone] [] [address]  P: [phone]  F: [phone] [] [address]  [facility]   [address]   [place]: [phone]  F: [phone]      Physical Therapy Daily Treatment Note    Date:  2021  Patient Name:  Naomi Sebastian    :  1960  MRN: [id number]  Physician: Briseida Odonnell MD                             Insurance: [facility] 60 visits per calendar year combined with SLP/PT no copay, no auth required.   Medical Diagnosis: Postmastectomy Lymphedema of left arm       Rehab Codes: I89.0, I97.2  Onset Date: 21   Next Dr. Lui Hopsonts: ?  Visit# / total visits:      Cancels/No Shows: 0    Subjective:  Pt reports no pain, L UE full and heavy though. Will start watching 2 mo old grand daughter on Monday 2 days a week. Notes she is anxious about getting 1st covid shot today. Pain:  [] Yes  [x] No Location: L UE  Pain Rating: (0-10 scale) 0/10  Pain altered Tx:  [x] No  [] Yes  Action:  Comments:    Objective:  Modalities:   Precautions: L BrCa, L mastectomy 23 LN removed , Chemo 1-2012 then radiation. Tamoxifen 5 yrs, anestrozol for 4 yrs-jt pain  Manual: Utilized PORi breast protocol to LEFT upper quarter for gentle manual lymphatic drainage, myofascial release and joint mobility to facilitate better function ROM and dynamics of lymph system.   Exercises:  Exercise Reps/ Time Weight/ Level Comments             Diaphragmatic breathing 5 cycles       Elbow winging supine 1m       Wand flexion  10x       Bye, bye ex 3x   Flex, abd             Scapular retraction  10x  *      wall slide    *      pec stretch at wall    *  varied arm position            Other: L shoulder AROM:  144° pulling in axilla before RX,151°  ULTT:L -       Treatment Charges: Mins Units   []  Modalities     []  Ther Exercise 10 1   [x]  Manual Therapy 45 3   []  Ther Activities     []  Aquatics     []  Vasocompression     []  Other     Total Treatment time 55        Assessment: [x] Progressing toward goals. Initiated PORI breast protocol to L upper quarter this date, jaydon well. Mod scar tissue restriction laterally>medially and fullness throughout L UE to hand. Instructed in self scar mobilization for home. Rev HEP and completed per log, added bye bye abd. Emphasized HEP daily as well as postural awareness. Will cont 1x weekly for manual and ex progression as jaydon. [] No change. [] Other:  [x] Patient would continue to benefit from skilled physical therapy services in order to: address the following goals    STG: (to be met in 12 treatments)  1. ? Pain: Stabilize shoulder pain and ensure optimal management of pain during all ADL's (home, occupation, community and recreation)  2. Optimize AROM of bilateral shoulders for functional activity to improve QOL. 3. Increase strength in bilateral shoulders and scapula to grossly 5/5 and L  by 3+ lbs for good postural stability and functional tasks  4. Independent with Home Exercise Programs  5. Education on signs and symptoms, precautions regarding lymphedema. LTG: (to be met in 18 treatments)  1. Pt to report improved sleep  2. Decr BFI score by 1 for improved jaydon to activity and overall increased function  3. Continue activity to decrease risk factors associated with increased time being sedentary while       undergoing chemotherapy, radiation, surgery.   4.   Improve tissue mobility of chest wall and axilla to allow for more normal motion, function and lymphatic mobility and drainage  5. Control/mitigate side effects/late effects of Cancer treatment      Pt. Education:  [x] Yes  [] No  [x] Reviewed Prior HEP/Ed  Method of Education: [x] Verbal  [x] Demo  [] Written  Comprehension of Education:  [x] Verbalizes understanding. [] Demonstrates understanding. [] Needs review. [] Demonstrates/verbalizes HEP/Ed previously given. Plan: [x] Continue current frequency toward long and short term goals.     [] Specific Instructions for subsequent treatments:       Time In:1200 pm            Time Out: 1300 pm    Electronically signed by:  Cindy De Leon, PT , NEELAM

## 2021-10-05 ENCOUNTER — HOSPITAL ENCOUNTER (OUTPATIENT)
Dept: PHYSICAL THERAPY | Facility: CLINIC | Age: 61
Setting detail: THERAPIES SERIES
Discharge: HOME | End: 2021-10-05
Payer: COMMERCIAL

## 2021-10-05 PROCEDURE — 97140 MANUAL THERAPY 1/> REGIONS: CPT

## 2021-10-05 PROCEDURE — 97110 THERAPEUTIC EXERCISES: CPT

## 2021-10-05 NOTE — FLOWSHEET NOTE
[] [facility]  [address]  [phone]  F: [phone] [] [address]  [facility]   [address]  P: [phone]  F: [phone] [x] [address] &  Therapy  [address]  P: [phone]  F: [phone] [] [address]  P: [phone]  F: [phone] [] [address]  [facility]   [address]   [place]: [phone]  F: [phone]      Physical Therapy Daily Treatment Note    Date:  10/5/2021  Patient Name:  David Espinosa    :  1960  MRN: [id number]  Physician: Elena Prasad MD                             Insurance: [employer] 60 visits per calendar year combined with SLP/PT no copay, no auth required.   Medical Diagnosis: Postmastectomy Lymphedema of left arm       Rehab Codes: I89.0, I97.2  Onset Date: 21   Next Dr. Beverly Hunt: ?  Visit# / total visits: 3/18     Cancels/No Shows: 0    Subjective:  Pt reports arm feeling ok today, a little puffy. Started watching 2 mo old grand daughter on  2 days a week ([employer]). Notes she got her covid shot last week and did well. Pain:  [] Yes  [x] No Location: L UE  Pain Rating: (0-10 scale) 0/10  Pain altered Tx:  [x] No  [] Yes  Action:  Comments:     Objective:  Modalities:   Precautions: L BrCa, L mastectomy 23 LN removed , Chemo 1-2012 then radiation. Tamoxifen 5 yrs, anestrozol for 4 yrs-jt pain  Manual: Utilized PORi breast protocol to LEFT upper quarter for gentle manual lymphatic drainage, myofascial release and joint mobility to facilitate better function ROM and dynamics of lymph system.   Exercises:  Exercise Reps/ Time Weight/ Level Comments             Diaphragmatic breathing 5 cycles       Elbow winging supine 1m       Wand flexion 10x       Bye, bye ex 3x   Flex, abd          post shld rolls, shrugs  10x       Scapular retraction  10x 5 sec      wall slide  10x  flex    pec stretch at wall  3x30   varied arm position          SL scap Abd              Other: L shoulder AROM:  149° pulling in axilla before RX,156°  ULTT:L -       Treatment Charges: Mins Units   []  Modalities     []  Ther Exercise 10 1   [x]  Manual Therapy 45 3   []  Ther Activities     []  Aquatics     []  Vasocompression     []  Other     Total Treatment time 55        Assessment: [x] Progressing toward goals. Cont'd w/ PORI breast protocol to L upper quarter w/ improved ROM noted. Mod scar tissue restriction laterally>medially and fullness throughout L UE to hand. Rev self scar mobilization for home. Rev HEP and completed per log, added shrugs/rolls, wall slides, scap retraction and pec stretch at door. Issued HO for HEP. Emphasized HEP daily in comfortable ROM as well as postural awareness. Will cont 1x weekly for manual and ex progression as jaydon. [] No change. [] Other:  [x] Patient would continue to benefit from skilled physical therapy services in order to: address the following goals    STG: (to be met in 12 treatments)  1. ? Pain: Stabilize shoulder pain and ensure optimal management of pain during all ADL's (home, occupation, community and recreation)  2. Optimize AROM of bilateral shoulders for functional activity to improve QOL. 3. Increase strength in bilateral shoulders and scapula to grossly 5/5 and L  by 3+ lbs for good postural stability and functional tasks  4. Independent with Home Exercise Programs  5. Education on signs and symptoms, precautions regarding lymphedema. LTG: (to be met in 18 treatments)  1. Pt to report improved sleep  2. Decr BFI score by 1 for improved jaydon to activity and overall increased function  3.    Continue activity to decrease risk factors associated with increased time being sedentary while       undergoing chemotherapy, radiation, surgery. 4.   Improve tissue mobility of chest wall and axilla to allow for more normal motion, function and             lymphatic mobility and drainage  5. Control/mitigate side effects/late effects of Cancer treatment      Pt. Education:  [x] Yes  [] No  [x] Reviewed Prior HEP/Ed  Method of Education: [x] Verbal  [x] Demo  [] Written  Comprehension of Education:  [x] Verbalizes understanding. [] Demonstrates understanding. [] Needs review. [] Demonstrates/verbalizes HEP/Ed previously given. Plan: [x] Continue current frequency toward long and short term goals.     [] Specific Instructions for subsequent treatments:       Time In: 1000 am            Time Out: 1100 am    Electronically signed by:  Kaleb Phan PT , NEELAM

## 2021-10-07 ENCOUNTER — APPOINTMENT (OUTPATIENT)
Dept: PHYSICAL THERAPY | Facility: CLINIC | Age: 61
End: 2021-10-07
Payer: COMMERCIAL

## 2021-10-14 ENCOUNTER — HOSPITAL ENCOUNTER (OUTPATIENT)
Dept: PHYSICAL THERAPY | Facility: CLINIC | Age: 61
Setting detail: THERAPIES SERIES
Discharge: HOME | End: 2021-10-14
Payer: COMMERCIAL

## 2021-10-14 PROCEDURE — 97110 THERAPEUTIC EXERCISES: CPT

## 2021-10-14 PROCEDURE — 97140 MANUAL THERAPY 1/> REGIONS: CPT

## 2021-10-14 NOTE — FLOWSHEET NOTE
[] [facility] [address]  [address]  [phone]  F: [phone] [] [address]  [facility] 36   [address]  P: [phone]  F: [phone] [x] [address]  Therapy  [address] [facility]  P: [phone]  F: [phone] [] [address]  P: [phone]  F: [phone] [] [address]  [facility]   [address]   [place]: [phone]  F: [phone]      Physical Therapy Daily Treatment Note    Date:  10/14/2021  Patient Name:  Ramila Dwyer    :  1960  MRN: [id number]  Physician: Bibiana Alcazar MD                             [facility] visits per calendar year combined with SLP/PT no copay, no auth required.   Medical Diagnosis: Postmastectomy Lymphedema of left arm       Rehab Codes: I89.0, I97.2  Onset Date: 21   Next Dr. Cuevas Pages: ?  Visit# / total visits:     Cancels/No Shows: 0    Subjective:  Pt reports arm is feeling ok, notes hand is improving and not as full. Looking forward to trip OOT next week. Pain:  [] Yes  [x] No Location: L UE  Pain Rating: (0-10 scale) 0/10  Pain altered Tx:  [x] No  [] Yes  Action:  Comments:     Objective:  Modalities:   Precautions: L BrCa, L mastectomy 23 LN removed , Chemo 1-2012 then radiation. Tamoxifen 5 yrs, anestrozol for 4 yrs-jt pain  Manual: Utilized PORi breast protocol to LEFT upper quarter for gentle manual lymphatic drainage, myofascial release and joint mobility to facilitate better function ROM and dynamics of lymph system.   Exercises:  Exercise Reps/ Time Weight/ Level Comments             Diaphragmatic breathing 5 cycles       Elbow winging supine 1m  HEP     Wand flexion  10x  HEP     Bye, bye ex 3x   Flex, abd          post shld rolls, shrugs  10x       Scapular retraction  10x 5 sec      wall slide  10x  flex    pec stretch at door  3x30   varied arm position          SL scap Abd 10x *    OH flex at wall      Wall angels            UE tband: Other: L shoulder AROM:  155° pulling in axilla before RX,157°  ULTT:L -       Treatment Charges: Mins Units   []  Modalities     []  Ther Exercise 10 1   [x]  Manual Therapy 45 3   []  Ther Activities     []  Aquatics     []  Vasocompression     []  Other     Total Treatment time 55        Assessment: [x] Progressing toward goals. Cont'd w/ PORI breast protocol to L upper quarter w/ improved ROM noted. Mod scar tissue restriction laterally>medially and fullness throughout L UE to hand. Rev self scar mobilization for home as well as use of lotion. Rev HEP and completed per log, added SL scap abd this date. Emphasized HEP daily in comfortable ROM as well as postural awareness. Will cont 1x weekly for manual and ex progression as jaydon. [] No change. [] Other:  [x] Patient would continue to benefit from skilled physical therapy services in order to: address the following goals    STG: (to be met in 12 treatments)  1. ? Pain: Stabilize shoulder pain and ensure optimal management of pain during all ADL's (home, occupation, community and recreation)  2. Optimize AROM of bilateral shoulders for functional activity to improve QOL. 3. Increase strength in bilateral shoulders and scapula to grossly 5/5 and L  by 3+ lbs for good postural stability and functional tasks  4. Independent with Home Exercise Programs  5. Education on signs and symptoms, precautions regarding lymphedema. LTG: (to be met in 18 treatments)  1. Pt to report improved sleep  2. Decr BFI score by 1 for improved jaydon to activity and overall increased function  3.    Continue activity to decrease risk factors associated with increased time being sedentary while       undergoing chemotherapy, radiation, surgery. 4.   Improve tissue mobility of chest wall and axilla to allow for more normal motion, function and             lymphatic mobility and drainage  5. Control/mitigate side effects/late effects of Cancer treatment      Pt. Education:  [x] Yes  [] No  [x] Reviewed Prior HEP/Ed  Method of Education: [x] Verbal  [x] Demo  [] Written  Comprehension of Education:  [x] Verbalizes understanding. [] Demonstrates understanding. [] Needs review. [] Demonstrates/verbalizes HEP/Ed previously given. Plan: [x] Continue current frequency toward long and short term goals.     [] Specific Instructions for subsequent treatments:       Time In: 1030 am            Time Out: 1130 am    Electronically signed by:  Dajuan Blanca PT , NEELAM

## 2021-10-26 ENCOUNTER — HOSPITAL ENCOUNTER (OUTPATIENT)
Dept: PHYSICAL THERAPY | Facility: CLINIC | Age: 61
Setting detail: THERAPIES SERIES
Discharge: HOME | End: 2021-10-26
Payer: COMMERCIAL

## 2021-10-26 PROCEDURE — 97110 THERAPEUTIC EXERCISES: CPT

## 2021-10-26 PROCEDURE — 97140 MANUAL THERAPY 1/> REGIONS: CPT

## 2021-10-26 NOTE — FLOWSHEET NOTE
[] [address]  [address]  [phone]  F: [phone] [] [address]  [facility] 36   [address]  P: [phone]  F: [phone] [x] [address]  Therapy  [address] [facility] [address]  P: [phone]  F: [phone] [] [address]  P: [phone]  F: [phone] [] [address]  [facility]   [address]   [place]: [phone]  F: [phone]      Physical Therapy Daily Treatment Note    Date:  10/26/2021  Patient Name:  Jacklyn Segundo    :  1960  MRN: [id number]  Physician: Nuha Rodney MD                             Insurance: [facility] 60 visits per calendar year combined with SLP/PT no copay, no auth required.   Medical Diagnosis: Postmastectomy Lymphedema of left arm       Rehab Codes: I89.0, I97.2  Onset Date: 21   Next Dr. Cleaning Labor: ?  Visit# / total visits:      Cancels/No Shows: 0    Subjective:  Pt reports she had a nice trip OOT had nice weather. Notes her arm is feeling ok, min incr fullness from trip and not doing as much HEP. Pain:  [] Yes  [x] No Location: L UE  Pain Rating: (0-10 scale) 0/10  Pain altered Tx:  [x] No  [] Yes  Action:  Comments:     Objective:  Modalities:   Precautions: L BrCa, L mastectomy 23 LN removed , Chemo 1-2012 then radiation. Tamoxifen 5 yrs, anestrozol for 4 yrs-jt pain  Manual: Utilized PORi breast protocol to LEFT upper quarter for gentle manual lymphatic drainage, myofascial release and joint mobility to facilitate better function ROM and dynamics of lymph system.   Exercises:  Exercise Reps/ Time Weight/ Level Comments             Diaphragmatic breathing 5 cycles       Elbow winging supine 1m  HEP     Wand flexion  10x  HEP     Bye, bye ex 3x   Flex, abd          post shld rolls, shrugs  10x       Scapular retraction  10x 5 sec      wall slide  10x  flex    pec stretch at door  3x30   varied arm position          SL scap Abd, ER 10x     OH flex at wall  *    Wall angels  *          UE tband: Other: L shoulder AROM:  160° pulling in axilla before RX,163°  ULTT:L -       Treatment Charges: Mins Units   []  Modalities     []  Ther Exercise 10 1   [x]  Manual Therapy 45 3   []  Ther Activities     []  Aquatics     []  Vasocompression     []  Other     Total Treatment time 55        Assessment: [x] Progressing toward goals. Cont'd w/ PORI breast protocol to L upper quarter w/ improved ROM noted. Cont's w/ mod scar tissue restriction laterally>medially and fullness throughout L UE to hand. Rev HEP and completed per log, progressed SL scap ex and issued HO for HEP. Emphasized HEP daily in comfortable ROM as well as postural awareness. Will cont 1x weekly for manual and ex progression as jaydon. [] No change. [] Other:  [x] Patient would continue to benefit from skilled physical therapy services in order to: address the following goals    STG: (to be met in 12 treatments)  1. ? Pain: Stabilize shoulder pain and ensure optimal management of pain during all ADL's (home, occupation, community and recreation)  2. Optimize AROM of bilateral shoulders for functional activity to improve QOL. 3. Increase strength in bilateral shoulders and scapula to grossly 5/5 and L  by 3+ lbs for good postural stability and functional tasks  4. Independent with Home Exercise Programs  5. Education on signs and symptoms, precautions regarding lymphedema. LTG: (to be met in 18 treatments)  1. Pt to report improved sleep  2. Decr BFI score by 1 for improved jaydon to activity and overall increased function  3.    Continue activity to decrease risk factors associated with increased time being sedentary while       undergoing chemotherapy, radiation, surgery. 4.   Improve tissue mobility of chest wall and axilla to allow for more normal motion, function and             lymphatic mobility and drainage  5. Control/mitigate side effects/late effects of Cancer treatment      Pt. Education:  [x] Yes  [] No  [x] Reviewed Prior HEP/Ed  Method of Education: [x] Verbal  [x] Demo  [] Written  Comprehension of Education:  [x] Verbalizes understanding. [] Demonstrates understanding. [] Needs review. [] Demonstrates/verbalizes HEP/Ed previously given. Plan: [x] Continue current frequency toward long and short term goals.     [] Specific Instructions for subsequent treatments:       Time In: 1100 am            Time Out: 1200 pm    Electronically signed by:  Joe Britton, PT , NEELAM

## 2021-11-02 ENCOUNTER — HOSPITAL ENCOUNTER (OUTPATIENT)
Dept: PHYSICAL THERAPY | Facility: CLINIC | Age: 61
Setting detail: THERAPIES SERIES
Discharge: HOME | End: 2021-11-02
Payer: COMMERCIAL

## 2021-11-02 PROCEDURE — 97110 THERAPEUTIC EXERCISES: CPT

## 2021-11-02 PROCEDURE — 97140 MANUAL THERAPY 1/> REGIONS: CPT

## 2021-11-02 NOTE — FLOWSHEET NOTE
[] [facility] [address] [facility]  [address]  [phone]  F: [phone] [] [address]  [facility] 36   [address]  P: [phone]  F: [phone] [x] [address]  Therapy  [address] [facility]  P: [phone]  F: [phone] [] [address]  P: [phone]  F: [phone] [] [address]  [facility]   [address]   [place]: [phone]  F: [phone]      Physical Therapy Daily Treatment Note    Date:  2021  Patient Name:  Ke aVzquez    :  1960  MRN: [id number]  Physician: Hanane Baker MD                             Insurance: [facility] 60 visits per calendar year combined with SLP/PT no copay, no auth required.   Medical Diagnosis: Postmastectomy Lymphedema of left arm       Rehab Codes: I89.0, I97.2  Onset Date: 21   Next Dr. Masterson Dadds: ?  Visit# / total visits:      Cancels/No Shows: 0    Subjective:  Pt reports she has been doing well, occ incr fullness in L UE but goes away easier noting hand is less swollen. Compliant w/ HEP daily. Pain:  [] Yes  [x] No Location: L UE  Pain Rating: (0-10 scale) 0/10  Pain altered Tx:  [x] No  [] Yes  Action:  Comments:     Objective:  Modalities:   Precautions: L BrCa, L mastectomy 23 LN removed , Chemo 1-2012 then radiation. Tamoxifen 5 yrs, anestrozol for 4 yrs-jt pain, no longer taking  Manual: Utilized PORi breast protocol to LEFT upper quarter for gentle manual lymphatic drainage, myofascial release and joint mobility to facilitate better function ROM and dynamics of lymph system.   Exercises:  Exercise Reps/ Time Weight/ Level Comments             Diaphragmatic breathing 5 cycles       Elbow winging supine 1m  HEP     Wand flexion  10x  HEP   Bye, bye ex 3x   Flex, abd          post shld rolls, shrugs  10x       Scapular retraction  10x 5 sec      wall slide  10x HEP flex    pec stretch at door  3x30   varied arm position          SL scap Abd, ER 10x     OH flex at wall  *    Wall angels  *          UE tband: Other: L shoulder AROM:  160° pulling in axilla before RX,164°  ULTT:L -       Treatment Charges: Mins Units   []  Modalities     []  Ther Exercise 10 1   [x]  Manual Therapy 45 3   []  Ther Activities     []  Aquatics     []  Vasocompression     []  Other     Total Treatment time 55        Assessment: [x] Progressing toward goals. Cont'd w/ PORI breast protocol to L upper quarter. Pt maintaining ROM well. Cont's w/ mod scar tissue restriction laterally>medially and fullness throughout L UE to hand but lessoning. Focused on manual to chest wall this date. Rev HEP and completed per log. Emphasized HEP daily in comfortable ROM as well as postural awareness. Will cont 1x weekly for manual and ex progression as jaydon. [] No change. [] Other:  [x] Patient would continue to benefit from skilled physical therapy services in order to: address the following goals    STG: (to be met in 12 treatments)  1. ? Pain: Stabilize shoulder pain and ensure optimal management of pain during all ADL's (home, occupation, community and recreation)  2. Optimize AROM of bilateral shoulders for functional activity to improve QOL. 3. Increase strength in bilateral shoulders and scapula to grossly 5/5 and L  by 3+ lbs for good postural stability and functional tasks  4. Independent with Home Exercise Programs  5. Education on signs and symptoms, precautions regarding lymphedema. LTG: (to be met in 18 treatments)  1. Pt to report improved sleep  2. Decr BFI score by 1 for improved jaydon to activity and overall increased function  3.    Continue activity to decrease risk factors associated with increased time being sedentary while undergoing chemotherapy, radiation, surgery. 4.   Improve tissue mobility of chest wall and axilla to allow for more normal motion, function and             lymphatic mobility and drainage  5. Control/mitigate side effects/late effects of Cancer treatment      Pt. Education:  [x] Yes  [] No  [x] Reviewed Prior HEP/Ed  Method of Education: [x] Verbal  [x] Demo  [] Written  Comprehension of Education:  [x] Verbalizes understanding. [] Demonstrates understanding. [] Needs review. [] Demonstrates/verbalizes HEP/Ed previously given. Plan: [x] Continue current frequency toward long and short term goals.     [] Specific Instructions for subsequent treatments:       Time In: 1100 am            Time Out: 1200 pm    Electronically signed by:  Ja Swanson PT , NEELAM

## 2021-11-04 ENCOUNTER — HOSPITAL ENCOUNTER (OUTPATIENT)
Dept: PHYSICAL THERAPY | Facility: CLINIC | Age: 61
Setting detail: THERAPIES SERIES
End: 2021-11-04
Payer: COMMERCIAL

## 2021-11-09 ENCOUNTER — APPOINTMENT (OUTPATIENT)
Dept: PHYSICAL THERAPY | Facility: CLINIC | Age: 61
End: 2021-11-09
Payer: COMMERCIAL

## 2021-11-18 ENCOUNTER — HOSPITAL ENCOUNTER (OUTPATIENT)
Dept: PHYSICAL THERAPY | Facility: CLINIC | Age: 61
Setting detail: THERAPIES SERIES
Discharge: HOME | End: 2021-11-18
Payer: COMMERCIAL

## 2021-11-18 PROCEDURE — 97110 THERAPEUTIC EXERCISES: CPT

## 2021-11-18 PROCEDURE — 97140 MANUAL THERAPY 1/> REGIONS: CPT

## 2021-11-23 ENCOUNTER — HOSPITAL ENCOUNTER (OUTPATIENT)
Dept: OCCUPATIONAL THERAPY | Age: 61
Setting detail: THERAPIES SERIES
Discharge: HOME | End: 2021-11-23
Payer: COMMERCIAL

## 2021-11-23 NOTE — SIGNIFICANT EVENT
[x] [address] Occupational Therapy       [address] [facility], 1st Floor       Phone: [phone]       Fax: [phone] [] [facility] at [address] [place] [address].  [place], [place]       Phone: [phone]       Fax: [phone]          Occupational Therapy Cancel/No Show note    Date: 2021  Patient: Cynthia Frazier  : 1960  MRN: [id number]    Cancels/No Shows to date: 1    For today's appointment patient:    [x]  Cancelled    [] Rescheduled appointment    [] No-show     Reason given by patient:    [x]  Patient ill    []  Conflicting appointment    [] No transportation      [] Conflict with work    [] No reason given    [] Weather related    [] COVID-19    [] Other:      Comments:       [x] Next appointment was confirmed:       Electronically signed by: Mathieu Obregon OT

## 2021-11-29 ENCOUNTER — OFFICE VISIT (OUTPATIENT)
Dept: FAMILY MEDICINE CLINIC | Age: 61
End: 2021-11-29
Payer: COMMERCIAL

## 2021-11-29 VITALS
HEART RATE: 112 BPM | OXYGEN SATURATION: 98 % | RESPIRATION RATE: 18 BRPM | SYSTOLIC BLOOD PRESSURE: 127 MMHG | DIASTOLIC BLOOD PRESSURE: 72 MMHG | TEMPERATURE: 98 F

## 2021-11-29 DIAGNOSIS — R60.0 LOWER EXTREMITY EDEMA: ICD-10-CM

## 2021-11-29 DIAGNOSIS — T14.8XXA BRUISING: ICD-10-CM

## 2021-11-29 DIAGNOSIS — M25.561 CHRONIC PAIN OF BOTH KNEES: Primary | ICD-10-CM

## 2021-11-29 DIAGNOSIS — M25.562 CHRONIC PAIN OF BOTH KNEES: Primary | ICD-10-CM

## 2021-11-29 DIAGNOSIS — G89.29 CHRONIC PAIN OF BOTH KNEES: Primary | ICD-10-CM

## 2021-11-29 PROBLEM — E66.9 ADIPOSITY: Status: ACTIVE | Noted: 2018-11-02

## 2021-11-29 PROBLEM — C50.919 MALIGNANT NEOPLASM OF BREAST (HCC): Status: ACTIVE | Noted: 2019-10-11

## 2021-11-29 PROBLEM — I89.0 LYMPHEDEMA OF LEFT ARM: Status: ACTIVE | Noted: 2021-05-11

## 2021-11-29 PROBLEM — F41.9 ANXIETY: Status: ACTIVE | Noted: 2018-11-02

## 2021-11-29 PROBLEM — C50.411 MALIGNANT NEOPLASM OF UPPER-OUTER QUADRANT OF RIGHT FEMALE BREAST (HCC): Status: ACTIVE | Noted: 2020-11-12

## 2021-11-29 PROBLEM — E55.9 VITAMIN D DEFICIENCY, UNSPECIFIED: Status: ACTIVE | Noted: 2020-11-12

## 2021-11-29 PROBLEM — R59.1 LYMPHADENOPATHY: Status: ACTIVE | Noted: 2018-11-02

## 2021-11-29 PROCEDURE — 99202 OFFICE O/P NEW SF 15 MIN: CPT | Performed by: NURSE PRACTITIONER

## 2021-11-29 RX ORDER — AMLODIPINE BESYLATE 5 MG/1
TABLET ORAL
COMMUNITY
End: 2022-03-15 | Stop reason: SDUPTHER

## 2021-11-29 ASSESSMENT — PATIENT HEALTH QUESTIONNAIRE - PHQ9
SUM OF ALL RESPONSES TO PHQ QUESTIONS 1-9: 0
SUM OF ALL RESPONSES TO PHQ9 QUESTIONS 1 & 2: 0
SUM OF ALL RESPONSES TO PHQ QUESTIONS 1-9: 0
SUM OF ALL RESPONSES TO PHQ QUESTIONS 1-9: 0
1. LITTLE INTEREST OR PLEASURE IN DOING THINGS: 0
2. FEELING DOWN, DEPRESSED OR HOPELESS: 0

## 2021-11-29 ASSESSMENT — ENCOUNTER SYMPTOMS
VOMITING: 0
CONSTIPATION: 0
EYE PAIN: 0
COLOR CHANGE: 1
COUGH: 0
NAUSEA: 0
DIARRHEA: 0
SHORTNESS OF BREATH: 0

## 2021-11-29 NOTE — PROGRESS NOTES
[address] WALK-IN  [address] [place] [address]  Dept: [phone]  Dept Fax: [phone]    Yoshi Mendes is a 63 y.o. female who presents today for her medical conditions/complaints of   Chief Complaint   Patient presents with    Knee Pain     R knee started first. L knee started after. Ongoing for 2 months. Has been going to Ortho for injections w/ out relief.  Gait Problem     issues walking ongoing since September. Has bakers cyst on back of R knee. Arthritis in knees. Had mri done in March.  Edema     edema and brusing R lower calf. HPI:     /72 (Site: Left Upper Arm, Position: Sitting, Cuff Size: Large Adult)   Pulse 112   Temp 98 °F (36.7 °C) (Temporal)   Resp 18   SpO2 98%       HPI  Pt presented to the clinic today with c/o bilateral knee pain. This is an ongoing problem. The current episode started months ago. The problem has been worsening since onset. Associated symptoms include: lower leg swelling, bruising. Pertinent negatives include: No fever, numbness, tingling, inability to bear weight. Pt has tried Aleve with little improvement. Taking prednisone 2.5 mg daily per rheumatology. Saw ortho- received injections. Considering gel injections. Ortho in [facility]. Saw PCP and was sent back to ortho. - 1-2 weeks ago. Participating in PT.        Past Medical History:   Diagnosis Date    Cancer [facility])     Breast:  left mastectomy, chemo radiation    Lymphedema     left arm    RA (rheumatoid arthritis) (HCC)         Past Surgical History:   Procedure Laterality Date    MASTECTOMY         Family History   Problem Relation Age of Onset    High Blood Pressure Mother     Cancer Father         liver, lung and prostate    Other Sister         MS    Colon Cancer Maternal Grandmother        Social History     Tobacco Use    Smoking status: Never Smoker    Smokeless tobacco: Never Used   Substance Use Topics    Alcohol use: Not on file        Prior to Visit Medications    Medication Sig Taking? Authorizing Provider   hydroxychloroquine (PLAQUENIL) 200 MG tablet Take 200 mg by mouth 2 times daily  Historical Provider, MD   amLODIPine (NORVASC) 5 MG tablet Take by mouth  Historical Provider, MD       No Known Allergies      Subjective:      Review of Systems   Constitutional: Negative for chills and fever. HENT: Negative for congestion. Eyes: Negative for pain and visual disturbance. Respiratory: Negative for cough and shortness of breath. Cardiovascular: Positive for leg swelling. Negative for chest pain. Gastrointestinal: Negative for constipation, diarrhea, nausea and vomiting. Genitourinary: Negative for decreased urine volume and difficulty urinating. Musculoskeletal: Positive for arthralgias, gait problem (limping at times) and joint swelling. Skin: Positive for color change (bruising). Neurological: Negative for light-headedness, numbness and headaches. Hematological: Bruises/bleeds easily. Objective:     Physical Exam  Vitals and nursing note reviewed. Constitutional:       General: She is not in acute distress. Appearance: Normal appearance. HENT:      Head: Normocephalic and atraumatic. Right Ear: Tympanic membrane and ear canal normal.      Left Ear: Tympanic membrane and ear canal normal.      Nose: Nose normal.      Mouth/Throat:      Lips: Pink. Mouth: Mucous membranes are moist.      Pharynx: Oropharynx is clear. Uvula midline. Eyes:      Extraocular Movements: Extraocular movements intact. Conjunctiva/sclera: Conjunctivae normal.   Cardiovascular:      Rate and Rhythm: Normal rate and regular rhythm. Pulses: Normal pulses. Pulmonary:      Effort: Pulmonary effort is normal.      Breath sounds: Normal breath sounds. Abdominal:      General: Bowel sounds are normal.      Palpations: Abdomen is soft.    Musculoskeletal:      Cervical back: Normal range of motion and neck supple. Right knee: Decreased range of motion. Tenderness present. Left knee: Decreased range of motion. Tenderness present. Right lower leg: Edema present. Skin:     General: Skin is warm and dry. Capillary Refill: Capillary refill takes less than 2 seconds. Findings: Bruising (behind right knee) present. Neurological:      Mental Status: She is alert and oriented to person, place, and time. Psychiatric:         Mood and Affect: Mood normal.         Thought Content: Thought content normal.           MEDICAL DECISION MAKING Assessment/Plan:     Jay Yost was seen today for knee pain, gait problem and edema. Diagnoses and all orders for this visit:    Chronic pain of both knees    Bruising    Lower extremity edema        Results for orders placed or performed in visit on 11/12/20   Platelet function test   Result Value Ref Range    FRANCA/EPI Clos Time 88 82 - 159 sec    Collagen Adenosine-5'-Diphosphate (Adp) Time 83 56 - 110 sec   Platelet Aggregation   Result Value Ref Range    Platelet Aggregation SEE SEPARATE REPORT    Factor VIII Activity   Result Value Ref Range    Factor 8 Functional 213 (H) 60 - 140 %   Reptilase time   Result Value Ref Range    Reptilase Tm 14.4 <=21.9 sec    Reptilase(corrected) Not Applicable <=42.7 sec   APTT   Result Value Ref Range    aPTT 31.2 25.0 - 35.0 sec   Thrombin Clot Time   Result Value Ref Range    Thrombin Time 16.8 15.0 - 20.0 sec   Fibrinogen   Result Value Ref Range    Fibrinogen 402 150 - 425 mg/dL       Patient presents with ongoing bilateral knee pain, swelling. Has seen ortho received injections and discussion of receiving gel injections- pt is reluctant to proceed. I do not have any of her ortho notes to review. Pt is unsure of what the problem is and unsure of what is actually being treated. I have provided pt with paperwork to request her medical records.     Pt is interested in obtaining a new PCP here in this office. I will request her medical records and have her set up an appt to establish care with PCP here. Patient given educational materials - see patientinstructions. Discussed use, benefit, and side effects of prescribed medications. All patient questions answered. Pt verbalized understanding. Instructed to continue current medications, diet and exercise. Patient agreed with treatment plan. Follow up as directed.      Electronically signed by DOV Gaines CNP on 11/30/2021 at 11:00 AM

## 2021-11-30 ENCOUNTER — HOSPITAL ENCOUNTER (OUTPATIENT)
Age: 61
Setting detail: SPECIMEN
Discharge: HOME | End: 2021-11-30

## 2021-11-30 ENCOUNTER — OFFICE VISIT (OUTPATIENT)
Dept: PRIMARY CARE CLINIC | Age: 61
End: 2021-11-30
Payer: COMMERCIAL

## 2021-11-30 VITALS
WEIGHT: 206.8 LBS | OXYGEN SATURATION: 96 % | HEART RATE: 110 BPM | HEIGHT: 65 IN | DIASTOLIC BLOOD PRESSURE: 88 MMHG | RESPIRATION RATE: 16 BRPM | BODY MASS INDEX: 34.45 KG/M2 | SYSTOLIC BLOOD PRESSURE: 106 MMHG

## 2021-11-30 DIAGNOSIS — Z13.0 SCREENING FOR DEFICIENCY ANEMIA: ICD-10-CM

## 2021-11-30 DIAGNOSIS — E55.9 VITAMIN D DEFICIENCY: ICD-10-CM

## 2021-11-30 DIAGNOSIS — C50.411 MALIGNANT NEOPLASM OF UPPER-OUTER QUADRANT OF RIGHT FEMALE BREAST, UNSPECIFIED ESTROGEN RECEPTOR STATUS (HCC): ICD-10-CM

## 2021-11-30 DIAGNOSIS — Z13.6 ENCOUNTER FOR LIPID SCREENING FOR CARDIOVASCULAR DISEASE: ICD-10-CM

## 2021-11-30 DIAGNOSIS — R22.41 LOCALIZED SWELLING OF RIGHT LOWER LEG: ICD-10-CM

## 2021-11-30 DIAGNOSIS — E53.8 VITAMIN B 12 DEFICIENCY: ICD-10-CM

## 2021-11-30 DIAGNOSIS — M25.562 CHRONIC PAIN OF BOTH KNEES: ICD-10-CM

## 2021-11-30 DIAGNOSIS — Z13.220 ENCOUNTER FOR LIPID SCREENING FOR CARDIOVASCULAR DISEASE: ICD-10-CM

## 2021-11-30 DIAGNOSIS — Z76.89 ENCOUNTER TO ESTABLISH CARE: Primary | ICD-10-CM

## 2021-11-30 DIAGNOSIS — Z13.29 SCREENING FOR THYROID DISORDER: ICD-10-CM

## 2021-11-30 DIAGNOSIS — M06.9 RHEUMATOID ARTHRITIS INVOLVING BOTH HANDS, UNSPECIFIED WHETHER RHEUMATOID FACTOR PRESENT (HCC): ICD-10-CM

## 2021-11-30 DIAGNOSIS — M25.561 CHRONIC PAIN OF BOTH KNEES: ICD-10-CM

## 2021-11-30 DIAGNOSIS — G89.29 CHRONIC PAIN OF BOTH KNEES: ICD-10-CM

## 2021-11-30 DIAGNOSIS — F41.9 ANXIETY: ICD-10-CM

## 2021-11-30 DIAGNOSIS — I10 PRIMARY HYPERTENSION: ICD-10-CM

## 2021-11-30 DIAGNOSIS — C50.919 MALIGNANT NEOPLASM OF FEMALE BREAST, UNSPECIFIED ESTROGEN RECEPTOR STATUS, UNSPECIFIED LATERALITY, UNSPECIFIED SITE OF BREAST (HCC): ICD-10-CM

## 2021-11-30 DIAGNOSIS — Z13.1 SCREENING FOR DIABETES MELLITUS: ICD-10-CM

## 2021-11-30 LAB
ABSOLUTE EOS #: 0.13 K/UL (ref 0–0.44)
ABSOLUTE IMMATURE GRANULOCYTE: 0.03 K/UL (ref 0–0.3)
ABSOLUTE LYMPH #: 1.37 K/UL (ref 1.1–3.7)
ABSOLUTE MONO #: 0.62 K/UL (ref 0.1–1.2)
ALBUMIN SERPL-MCNC: 4 G/DL (ref 3.5–5.2)
ALBUMIN/GLOBULIN RATIO: 1.1 (ref 1–2.5)
ALP BLD-CCNC: 115 U/L (ref 35–104)
ALT SERPL-CCNC: 21 U/L (ref 5–33)
ANION GAP SERPL CALCULATED.3IONS-SCNC: 12 MMOL/L (ref 9–17)
AST SERPL-CCNC: 22 U/L
BASOPHILS # BLD: 1 % (ref 0–2)
BASOPHILS ABSOLUTE: 0.05 K/UL (ref 0–0.2)
BILIRUB SERPL-MCNC: 0.59 MG/DL (ref 0.3–1.2)
BNP INTERPRETATION: NORMAL
BUN BLDV-MCNC: 11 MG/DL (ref 6–20)
BUN/CREAT BLD: ABNORMAL (ref 9–20)
CALCIUM SERPL-MCNC: 9.6 MG/DL (ref 8.6–10.4)
CHLORIDE BLD-SCNC: 102 MMOL/L (ref 98–107)
CHOLESTEROL/HDL RATIO: 2.1
CHOLESTEROL: 165 MG/DL
CO2: 23 MMOL/L (ref 20–31)
CREAT SERPL-MCNC: 0.56 MG/DL (ref 0.5–0.9)
DIFFERENTIAL TYPE: ABNORMAL
EOSINOPHILS RELATIVE PERCENT: 2 % (ref 1–4)
ESTIMATED AVERAGE GLUCOSE: 111 MG/DL
FOLATE: 14.4 NG/ML
GFR AFRICAN AMERICAN: >60 ML/MIN
GFR NON-AFRICAN AMERICAN: >60 ML/MIN
GFR SERPL CREATININE-BSD FRML MDRD: ABNORMAL ML/MIN/{1.73_M2}
GFR SERPL CREATININE-BSD FRML MDRD: ABNORMAL ML/MIN/{1.73_M2}
GLUCOSE BLD-MCNC: 84 MG/DL (ref 70–99)
HBA1C MFR BLD: 5.5 % (ref 4–6)
HCT VFR BLD CALC: 47.5 % (ref 36.3–47.1)
HDLC SERPL-MCNC: 79 MG/DL
HEMOGLOBIN: 15 G/DL (ref 11.9–15.1)
IMMATURE GRANULOCYTES: 0 %
LDL CHOLESTEROL: 68 MG/DL (ref 0–130)
LYMPHOCYTES # BLD: 15 % (ref 24–43)
MCH RBC QN AUTO: 28 PG (ref 25.2–33.5)
MCHC RBC AUTO-ENTMCNC: 31.6 G/DL (ref 28.4–34.8)
MCV RBC AUTO: 88.6 FL (ref 82.6–102.9)
MONOCYTES # BLD: 7 % (ref 3–12)
NRBC AUTOMATED: 0 PER 100 WBC
PDW BLD-RTO: 12.3 % (ref 11.8–14.4)
PLATELET # BLD: 366 K/UL (ref 138–453)
PLATELET ESTIMATE: ABNORMAL
PMV BLD AUTO: 10.1 FL (ref 8.1–13.5)
POTASSIUM SERPL-SCNC: 4.3 MMOL/L (ref 3.7–5.3)
PRO-BNP: 94 PG/ML
RBC # BLD: 5.36 M/UL (ref 3.95–5.11)
RBC # BLD: ABNORMAL 10*6/UL
SEG NEUTROPHILS: 75 % (ref 36–65)
SEGMENTED NEUTROPHILS ABSOLUTE COUNT: 6.68 K/UL (ref 1.5–8.1)
SODIUM BLD-SCNC: 137 MMOL/L (ref 135–144)
TOTAL PROTEIN: 7.7 G/DL (ref 6.4–8.3)
TRIGL SERPL-MCNC: 90 MG/DL
TSH SERPL DL<=0.05 MIU/L-ACNC: 1.24 MIU/L (ref 0.3–5)
VITAMIN B-12: 1094 PG/ML (ref 232–1245)
VITAMIN D 25-HYDROXY: 55.9 NG/ML (ref 30–100)
VLDLC SERPL CALC-MCNC: NORMAL MG/DL (ref 1–30)
WBC # BLD: 8.9 K/UL (ref 3.5–11.3)
WBC # BLD: ABNORMAL 10*3/UL

## 2021-11-30 PROCEDURE — 99214 OFFICE O/P EST MOD 30 MIN: CPT | Performed by: NURSE PRACTITIONER

## 2021-11-30 RX ORDER — HYDROXYCHLOROQUINE SULFATE 200 MG/1
200 TABLET, FILM COATED ORAL 2 TIMES DAILY
COMMUNITY

## 2021-11-30 SDOH — ECONOMIC STABILITY: FOOD INSECURITY: WITHIN THE PAST 12 MONTHS, YOU WORRIED THAT YOUR FOOD WOULD RUN OUT BEFORE YOU GOT MONEY TO BUY MORE.: NEVER TRUE

## 2021-11-30 SDOH — ECONOMIC STABILITY: FOOD INSECURITY: WITHIN THE PAST 12 MONTHS, THE FOOD YOU BOUGHT JUST DIDN'T LAST AND YOU DIDN'T HAVE MONEY TO GET MORE.: NEVER TRUE

## 2021-11-30 ASSESSMENT — ENCOUNTER SYMPTOMS
EYE ITCHING: 0
TROUBLE SWALLOWING: 0
WHEEZING: 0
DIARRHEA: 0
EYE DISCHARGE: 0
COUGH: 0
SINUS PRESSURE: 0
NAUSEA: 0
SORE THROAT: 0
SINUS PAIN: 0
CHEST TIGHTNESS: 0
VOMITING: 0
ABDOMINAL PAIN: 0
SHORTNESS OF BREATH: 0
EYE REDNESS: 0

## 2021-11-30 ASSESSMENT — PATIENT HEALTH QUESTIONNAIRE - PHQ9
SUM OF ALL RESPONSES TO PHQ QUESTIONS 1-9: 0
2. FEELING DOWN, DEPRESSED OR HOPELESS: 0
1. LITTLE INTEREST OR PLEASURE IN DOING THINGS: 0
SUM OF ALL RESPONSES TO PHQ QUESTIONS 1-9: 0
SUM OF ALL RESPONSES TO PHQ QUESTIONS 1-9: 0
SUM OF ALL RESPONSES TO PHQ9 QUESTIONS 1 & 2: 0

## 2021-11-30 ASSESSMENT — SOCIAL DETERMINANTS OF HEALTH (SDOH): HOW HARD IS IT FOR YOU TO PAY FOR THE VERY BASICS LIKE FOOD, HOUSING, MEDICAL CARE, AND HEATING?: NOT HARD AT ALL

## 2021-11-30 NOTE — PROGRESS NOTES
[address] PRIMARY CARE  [address]  [address] [address]  Dept: [phone]  Dept Fax: [phone]    Cynthia Frazier is a 63 y.o. female who presents today for her medical conditions/complaintsas noted below. Cynthia Frazier is c/o of Establish Care (bilateral knee pain, right knee swelling, was seen in walk in clinic yesterday) and Health Maintenance (last colonoscopy was at 50, pt will find out where it was done)        HPI:     Pt presents to establish care. Previous pcp was dr. Maxim Vazquez office. She was following prasanth. bp stable   Weight stable    Pt presents to establish care. Colonoscopy when she was 52 somewhere in [place]. Unsure of the name of the doctor. Due for mammogram in February     Hx of breast cancer 10 years ago. Oncologist orders mammogram    Hx of total hysterectomy. No longer gets paps. No longer has ovaries. Recent bilateral knee. Back in march the right knee started. She went to ortho in [place]. She had an MRI. Then she had injections. Its been fine until September both of her knees started hurting. She feels like she is walking on glass. She went back to ortho and had another injection in the right and left. Did not seem to help as much. She followed up as much. She did do PT back in march with mild relief. She does have bilateral bakers cyst    Right leg has bruising that started a few or so ago.        Past Medical History:   Diagnosis Date    Cancer [facility])     Breast:  left mastectomy, chemo radiation    Lymphedema     left arm    RA (rheumatoid arthritis) (HCC)       Past Surgical History:   Procedure Laterality Date    MASTECTOMY         Family History   Problem Relation Age of Onset    High Blood Pressure Mother     Cancer Father         liver, lung and prostate    Other Sister         MS    Colon Cancer Maternal Grandmother        Social History     Tobacco Use    Smoking status: Never Smoker    Smokeless tobacco: Never Used   Substance Use Topics    Alcohol use: Not on file      Current Outpatient Medications   Medication Sig Dispense Refill    hydroxychloroquine (PLAQUENIL) 200 MG tablet Take 200 mg by mouth 2 times daily      amLODIPine (NORVASC) 5 MG tablet Take by mouth       No current facility-administered medications for this visit. No Known Allergies    Health Maintenance   Topic Date Due    Hepatitis C screen  Never done    HIV screen  Never done    DTaP/Tdap/Td vaccine (1 - Tdap) Never done    Cervical cancer screen  Never done    Lipid screen  Never done    Diabetes screen  Never done    Colon cancer screen colonoscopy  Never done    Shingles Vaccine (1 of 2) Never done    Flu vaccine (1) Never done    COVID-19 Vaccine (2 - Moderna 3-dose booster series) 10/28/2021    Breast cancer screen  02/03/2022    Hepatitis A vaccine  Aged Out    Hepatitis B vaccine  Aged Out    Hib vaccine  Aged Out    Meningococcal (ACWY) vaccine  Aged Out    Pneumococcal 0-64 years Vaccine  Aged Out       :     Review of Systems   Constitutional: Negative for chills, fatigue and fever. HENT: Negative for ear discharge, ear pain, sinus pressure, sinus pain, sore throat and trouble swallowing. Eyes: Negative for discharge, redness and itching. Respiratory: Negative for cough, chest tightness, shortness of breath and wheezing. Cardiovascular: Negative for chest pain. Gastrointestinal: Negative for abdominal pain, diarrhea, nausea and vomiting. Genitourinary: Negative for difficulty urinating. Musculoskeletal: Positive for arthralgias. Negative for neck pain. Skin: Negative for rash. Neurological: Negative for dizziness, weakness, light-headedness and headaches. All other systems reviewed and are negative. Objective:     Physical Exam  Constitutional:       Appearance: Normal appearance. She is obese. HENT:      Head: Normocephalic and atraumatic.       Nose: Nose normal.   Eyes:      Extraocular Movements: Extraocular movements intact. Conjunctiva/sclera: Conjunctivae normal.      Pupils: Pupils are equal, round, and reactive to light. Cardiovascular:      Rate and Rhythm: Normal rate and regular rhythm. Pulses: Normal pulses. Heart sounds: Normal heart sounds. Pulmonary:      Effort: Pulmonary effort is normal.      Breath sounds: Normal breath sounds. Abdominal:      General: Abdomen is flat. Palpations: Abdomen is soft. Musculoskeletal:         General: Normal range of motion. Cervical back: Neck supple. Comments: Unable to reproduce pain on exam to bilateral knees. She does have mild swelling to the RLE with ecchymosis to the posterior part of the leg. Skin:     General: Skin is warm and dry. Capillary Refill: Capillary refill takes less than 2 seconds. Neurological:      General: No focal deficit present. Mental Status: She is alert and oriented to person, place, and time. Psychiatric:         Mood and Affect: Mood normal.       /88   Pulse 110   Resp 16   Ht 5' 5\" (1.651 m)   Wt 206 lb 12.8 oz (93.8 kg)   SpO2 96%   Breastfeeding No   BMI 34.41 kg/m²     Assessment:       Diagnosis Orders   1. Encounter to establish care     2. Screening for deficiency anemia  CBC Auto Differential   3. Screening for thyroid disorder  TSH with Reflex   4. Encounter for lipid screening for cardiovascular disease  Lipid Panel    Comprehensive Metabolic Panel   5. Vitamin B 12 deficiency  Vitamin B12 & Folate   6. Vitamin D deficiency  Vitamin D 25 Hydroxy   7. Screening for diabetes mellitus  Hemoglobin A1C   8. Localized swelling of right lower leg  Brain Natriuretic Peptide    US DUP LOWER EXTREMITY RIGHT ARIS   9. Primary hypertension     10. Malignant neoplasm of upper-outer quadrant of right female breast, unspecified estrogen receptor status ([facility] [address]     11.  Malignant neoplasm of female breast, unspecified estrogen receptor status, unspecified laterality, unspecified site of breast ([facility] [address]     12. Anxiety     13. Chronic pain of both knees     14. Rheumatoid arthritis involving both hands, unspecified whether rheumatoid factor present ([facility] [address]         :      Return in about 2 weeks (around 12/14/2021) for physicla . 1. Encounter to establish care  -reviewed available records. Limited records noted  2.-7. Screening  -rx for lab work   8. Swelling of leg  -rx for labs  -rx for US to r/o dvt  9. HTN  -bp stable   -c/w norvasc  10-11. Breast cancer  -stable. Follows oncology  12. Anxiety  -stable. Not currently on medication   13. Chronic pain of both knees  -recommend f/u with ortho for further evaluation. I discussed she likely needs the gel or possible surgery  14. RA  -stable. Follows rheum. F/u in 2 weeks for a physical   Orders Placed This Encounter   Procedures    US DUP LOWER EXTREMITY RIGHT ARIS     Standing Status:   Future     Standing Expiration Date:   11/30/2022     Order Specific Question:   Reason for exam:     Answer:   swelling to right lower leg x 2 weeks.  CBC Auto Differential     Standing Status:   Future     Number of Occurrences:   1     Standing Expiration Date:   11/30/2022    TSH with Reflex     Standing Status:   Future     Number of Occurrences:   1     Standing Expiration Date:   11/30/2022    Lipid Panel     Standing Status:   Future     Number of Occurrences:   1     Standing Expiration Date:   2/28/2022     Order Specific Question:   Is Patient Fasting?/# of Hours     Answer:    Fast 8-10 hours    Comprehensive Metabolic Panel     Standing Status:   Future     Number of Occurrences:   1     Standing Expiration Date:   11/30/2022    Vitamin B12 & Folate     Standing Status:   Future     Number of Occurrences:   1     Standing Expiration Date:   11/30/2022    Vitamin D 25 Hydroxy     Standing Status:   Future     Number of Occurrences:   1     Standing Expiration Date:   11/30/2022  Hemoglobin A1C     Standing Status:   Future     Number of Occurrences:   1     Standing Expiration Date:   11/30/2022    Brain Natriuretic Peptide     Standing Status:   Future     Number of Occurrences:   1     Standing Expiration Date:   11/30/2022     No orders of the defined types were placed in this encounter. Patient given educational materials - seepatient instructions. Discussed use, benefit, and side effects of prescribed medications. All patient questions answered. Pt voiced understanding. Reviewed health maintenance. Instructed to continue current medications, diet and exercise. Patient agreedwith treatment plan. Follow up as directed.       Electronically signed by DOV Demarco CNP on 11/30/2021at 12:13 PM

## 2021-12-02 ENCOUNTER — HOSPITAL ENCOUNTER (OUTPATIENT)
Dept: PHYSICAL THERAPY | Facility: CLINIC | Age: 61
Setting detail: THERAPIES SERIES
Discharge: HOME | End: 2021-12-02
Payer: COMMERCIAL

## 2021-12-02 PROCEDURE — 97110 THERAPEUTIC EXERCISES: CPT

## 2021-12-02 PROCEDURE — 97140 MANUAL THERAPY 1/> REGIONS: CPT

## 2021-12-07 ENCOUNTER — HOSPITAL ENCOUNTER (OUTPATIENT)
Dept: VASCULAR LAB | Age: 61
Discharge: HOME | End: 2021-12-07
Payer: COMMERCIAL

## 2021-12-07 DIAGNOSIS — R22.41 LOCALIZED SWELLING OF RIGHT LOWER LEG: ICD-10-CM

## 2021-12-07 PROCEDURE — 93971 EXTREMITY STUDY: CPT

## 2021-12-14 ENCOUNTER — OFFICE VISIT (OUTPATIENT)
Dept: PRIMARY CARE CLINIC | Age: 61
End: 2021-12-14
Payer: COMMERCIAL

## 2021-12-14 VITALS
BODY MASS INDEX: 32.82 KG/M2 | WEIGHT: 204.2 LBS | HEIGHT: 66 IN | DIASTOLIC BLOOD PRESSURE: 84 MMHG | HEART RATE: 107 BPM | OXYGEN SATURATION: 97 % | RESPIRATION RATE: 14 BRPM | SYSTOLIC BLOOD PRESSURE: 118 MMHG

## 2021-12-14 DIAGNOSIS — G89.29 CHRONIC PAIN OF BOTH KNEES: ICD-10-CM

## 2021-12-14 DIAGNOSIS — R79.89 ELEVATED LFTS: ICD-10-CM

## 2021-12-14 DIAGNOSIS — M06.9 RHEUMATOID ARTHRITIS INVOLVING BOTH HANDS, UNSPECIFIED WHETHER RHEUMATOID FACTOR PRESENT (HCC): ICD-10-CM

## 2021-12-14 DIAGNOSIS — Z00.00 ENCOUNTER FOR WELL ADULT EXAM WITHOUT ABNORMAL FINDINGS: Primary | ICD-10-CM

## 2021-12-14 DIAGNOSIS — M71.20 SYNOVIAL CYST OF POPLITEAL SPACE, UNSPECIFIED LATERALITY: ICD-10-CM

## 2021-12-14 DIAGNOSIS — M25.562 CHRONIC PAIN OF BOTH KNEES: ICD-10-CM

## 2021-12-14 DIAGNOSIS — I10 PRIMARY HYPERTENSION: ICD-10-CM

## 2021-12-14 DIAGNOSIS — M25.561 CHRONIC PAIN OF BOTH KNEES: ICD-10-CM

## 2021-12-14 PROCEDURE — 99396 PREV VISIT EST AGE 40-64: CPT | Performed by: NURSE PRACTITIONER

## 2021-12-14 RX ORDER — TRAMADOL HYDROCHLORIDE 50 MG/1
50 TABLET ORAL EVERY 8 HOURS PRN
Qty: 90 TABLET | Refills: 0 | Status: SHIPPED | OUTPATIENT
Start: 2021-12-14 | End: 2022-01-13

## 2021-12-14 ASSESSMENT — PATIENT HEALTH QUESTIONNAIRE - PHQ9
1. LITTLE INTEREST OR PLEASURE IN DOING THINGS: 0
SUM OF ALL RESPONSES TO PHQ9 QUESTIONS 1 & 2: 0
SUM OF ALL RESPONSES TO PHQ QUESTIONS 1-9: 0
2. FEELING DOWN, DEPRESSED OR HOPELESS: 0

## 2021-12-14 ASSESSMENT — ENCOUNTER SYMPTOMS
VOMITING: 0
EYE DISCHARGE: 0
NAUSEA: 0
SINUS PRESSURE: 0
SORE THROAT: 0
ABDOMINAL PAIN: 0
WHEEZING: 0
SHORTNESS OF BREATH: 0
CHEST TIGHTNESS: 0
EYE ITCHING: 0
TROUBLE SWALLOWING: 0
DIARRHEA: 0
EYE REDNESS: 0
SINUS PAIN: 0
COUGH: 0

## 2021-12-14 NOTE — PROGRESS NOTES
Well Adult Note  Name: Marcella Interiano Date: 2021   MRN: [id number] Sex: Female   Age: 63 y.o. Ethnicity: Non- / Non    : 1960 Race: White (non-)      Yelitza Harrell is here for well adult exam.  History:    Patient presents for physical  Blood pressure stable  Weight is down 2 pounds    Patient presents for annual physical  She did follow-up with a rheumatologist who would like to have her Baker's cyst removed. We discussed her ultrasound results. She would like a referral to a new orthopedic doctor in the area. Her rheumatologist did start her on 10 mg of prednisone daily but she does not feel like that may necessarily be enough for her pain. Complains that her legs are still swollen. Hard for her to be active d/t the pain. She eats clean and drinks celery juice daily to help detox her liver and body     Review of Systems   Constitutional: Negative for chills, fatigue and fever. HENT: Negative for ear discharge, ear pain, sinus pressure, sinus pain, sore throat and trouble swallowing. Eyes: Negative for discharge, redness and itching. Respiratory: Negative for cough, chest tightness, shortness of breath and wheezing. Cardiovascular: Negative for chest pain. Gastrointestinal: Negative for abdominal pain, diarrhea, nausea and vomiting. Genitourinary: Negative for difficulty urinating. Musculoskeletal: Positive for arthralgias. Negative for neck pain. Skin: Negative for rash. Neurological: Negative for dizziness, weakness, light-headedness and headaches. All other systems reviewed and are negative. No Known Allergies      Prior to Visit Medications    Medication Sig Taking? Authorizing Provider   Naproxen Sodium (ALEVE PO) Take 220 mg by mouth 2 times daily Yes Historical Provider, MD   traMADol (ULTRAM) 50 MG tablet Take 1 tablet by mouth every 8 hours as needed for Pain for up to 30 days.  Take lowest dose possible to manage pain Yes Fred Dickerson APRN - CNP   hydroxychloroquine (PLAQUENIL) 200 MG tablet Take 200 mg by mouth 2 times daily Yes Historical Provider, MD   amLODIPine (NORVASC) 5 MG tablet Take by mouth Yes Historical Provider, MD         Past Medical History:   Diagnosis Date    Cancer [facility])     Breast:  left mastectomy, chemo radiation    Lymphedema     left arm    RA (rheumatoid arthritis) ([address]        Past Surgical History:   Procedure Laterality Date    MASTECTOMY           Family History   Problem Relation Age of Onset    High Blood Pressure Mother     Cancer Father         liver, lung and prostate    Other Sister         MS    Colon Cancer Maternal Grandmother        Social History     Tobacco Use    Smoking status: Never Smoker    Smokeless tobacco: Never Used   Substance Use Topics    Alcohol use: Not on file    Drug use: Not on file       Objective   /84   Pulse 107   Resp 14   Ht 5' 5.5\" (1.664 m)   Wt 204 lb 3.2 oz (92.6 kg)   SpO2 97%   Breastfeeding No   BMI 33.46 kg/m²   Wt Readings from Last 3 Encounters:   12/14/21 204 lb 3.2 oz (92.6 kg)   11/30/21 206 lb 12.8 oz (93.8 kg)     There were no vitals filed for this visit. Physical Exam  Constitutional:       Appearance: Normal appearance. She is obese. HENT:      Head: Normocephalic and atraumatic. Nose: Nose normal.   Eyes:      Extraocular Movements: Extraocular movements intact. Conjunctiva/sclera: Conjunctivae normal.      Pupils: Pupils are equal, round, and reactive to light. Cardiovascular:      Rate and Rhythm: Normal rate and regular rhythm. Pulses: Normal pulses. Heart sounds: Normal heart sounds. Pulmonary:      Effort: Pulmonary effort is normal.      Breath sounds: Normal breath sounds. Abdominal:      General: Abdomen is flat. Palpations: Abdomen is soft. Musculoskeletal:         General: Normal range of motion. Cervical back: Neck supple.       Comments: RA noted to both hands   Skin:     General: Skin is warm and dry. Capillary Refill: Capillary refill takes less than 2 seconds. Neurological:      General: No focal deficit present. Mental Status: She is alert and oriented to person, place, and time. Psychiatric:         Mood and Affect: Mood normal.       Assessment   Plan   1. Encounter for well adult exam without abnormal findings  -Reviewed and discussed lab work in detail with the patient  2. Chronic pain of both knees  -     Nara - Bernie Martinez DO, Orthopedic Surgery, [place]  -     traMADol (ULTRAM) 50 MG tablet; Take 1 tablet by mouth every 8 hours as needed for Pain for up to 30 days. Take lowest dose possible to manage pain, Disp-90 tablet, R-0Normal  3. Elevated LFTs  -     Comprehensive Metabolic Panel; Future  -Repeat labs in 2 weeks  4. Primary hypertension  -Blood pressure is stable. Continue with current medication  5. Rheumatoid arthritis involving both hands, unspecified whether rheumatoid factor present ([facility] [address]  -Stable. Following with rheumatology  6.  Synovial cyst of popliteal space, unspecified laterality  -Referral to Ortho    Patient follow-up in 3 months or sooner as needed         Personalized Preventive Plan   Current Health Maintenance Status  Immunization History   Administered Date(s) Administered    COVID-19, Kiersten Mccloud, Primary or Immunocompromised, PF, 100mcg/0.5mL 09/30/2021        Health Maintenance   Topic Date Due    Hepatitis C screen  Never done    HIV screen  Never done    DTaP/Tdap/Td vaccine (1 - Tdap) Never done    Colon cancer screen colonoscopy  Never done    Shingles Vaccine (1 of 2) Never done    COVID-19 Vaccine (2 - Moderna 3-dose booster series) 10/28/2021    Flu vaccine (1) 12/14/2022 (Originally 9/1/2021)    Breast cancer screen  02/03/2022    Lipid screen  11/30/2026    Hepatitis A vaccine  Aged Out    Hepatitis B vaccine  Aged Out    Hib vaccine  Aged Out    Meningococcal (ACWY) vaccine  Aged Out  Pneumococcal 0-64 years Vaccine  Aged Out     Recommendations for Preventive Services Due: see orders and patient instructions/AVS.    Return in about 3 months (around 3/14/2022) for follow up.

## 2021-12-20 ENCOUNTER — HOSPITAL ENCOUNTER (OUTPATIENT)
Dept: PHYSICAL THERAPY | Facility: CLINIC | Age: 61
Setting detail: THERAPIES SERIES
Discharge: HOME | End: 2021-12-20
Payer: COMMERCIAL

## 2021-12-20 PROCEDURE — 97110 THERAPEUTIC EXERCISES: CPT

## 2021-12-20 PROCEDURE — 97140 MANUAL THERAPY 1/> REGIONS: CPT

## 2021-12-20 NOTE — FLOWSHEET NOTE
Elbow winging supine 1m  HEP     Wand flexion  10x  HEP     Bye, bye ex 3x   Flex, abd          post shld rolls, shrugs  10x       Scapular retraction  10x 5 sec      wall slide  10x HEP flex    pec stretch at door  3x30   varied arm position          SL scap Abd, ER, HAB 10x     OH flex at wall 10x     Wall angels 10x           UE tband: ext, row, ER, tricep, bicep 10x ea Lime            Other: L shoulder AROM:  160° pulling in axilla before RX,165°  ULTT:L -       Treatment Charges: Mins Units   []  Modalities     []  Ther Exercise 10 1   [x]  Manual Therapy 45 3   []  Ther Activities     []  Aquatics     []  Vasocompression     []  Other     Total Treatment time 55        Assessment: [x] Progressing toward goals. Cont'd w/ PORI breast protocol to L upper quarter. Pt maintaining ROM well and improving scar tissue mobility. Decr fullness throughout L UE. Focused on manual to chest wall this date. Rev HEP and completed per log w/ good jaydon to added UE tband ex. Emphasized pertinent ex to complete daily and importance of HEP daily in comfortable ROM as well as postural awareness. Will cont bi-weekly for manual and ex progression as jaydon. [] No change. [] Other:  [x] Patient would continue to benefit from skilled physical therapy services in order to: address the following goals    STG: (to be met in 12 treatments)  1. ? Pain: Stabilize shoulder pain and ensure optimal management of pain during all ADL's (home, occupation, community and recreation)  2. Optimize AROM of bilateral shoulders for functional activity to improve QOL. 3. Increase strength in bilateral shoulders and scapula to grossly 5/5 and L  by 3+ lbs for good postural stability and functional tasks  4. Independent with Home Exercise Programs  5. Education on signs and symptoms, precautions regarding lymphedema. LTG: (to be met in 18 treatments)  1. Pt to report improved sleep  2.   Decr BFI score by 1 for improved jaydon to activity and overall increased function  3. Continue activity to decrease risk factors associated with increased time being sedentary while       undergoing chemotherapy, radiation, surgery. 4.   Improve tissue mobility of chest wall and axilla to allow for more normal motion, function and             lymphatic mobility and drainage  5. Control/mitigate side effects/late effects of Cancer treatment      Pt. Education:  [x] Yes  [] No  [x] Reviewed Prior HEP/Ed  Method of Education: [x] Verbal  [x] Demo  [] Written  Comprehension of Education:  [x] Verbalizes understanding. [] Demonstrates understanding. [] Needs review. [] Demonstrates/verbalizes HEP/Ed previously given. Plan: [x] Continue current frequency toward long and short term goals.     [] Specific Instructions for subsequent treatments:       Time In: 2:30 pm            Time Out: 3:30 pm     Electronically signed by:  Teresa Gregorio, PT , NEELAM

## 2021-12-21 ENCOUNTER — HOSPITAL ENCOUNTER (OUTPATIENT)
Dept: OCCUPATIONAL THERAPY | Age: 61
Setting detail: THERAPIES SERIES
End: 2021-12-21
Payer: COMMERCIAL

## 2021-12-28 ENCOUNTER — TELEPHONE (OUTPATIENT)
Dept: PRIMARY CARE CLINIC | Age: 61
End: 2021-12-28

## 2021-12-28 NOTE — TELEPHONE ENCOUNTER
Patient called requesting copy of left knee xray from [facility] be faxed to Dr Juany Rosas. Advised patient only report we received from them is a MRI. Writer re faxed request for xray and once received she would like report faxed to [facility]. Sending message to her MA to follow up on.

## 2022-01-04 ENCOUNTER — HOSPITAL ENCOUNTER (OUTPATIENT)
Dept: PHYSICAL THERAPY | Facility: CLINIC | Age: 62
Setting detail: THERAPIES SERIES
Discharge: HOME | End: 2022-01-04
Payer: COMMERCIAL

## 2022-01-04 PROCEDURE — 97140 MANUAL THERAPY 1/> REGIONS: CPT

## 2022-01-04 PROCEDURE — 97110 THERAPEUTIC EXERCISES: CPT

## 2022-01-04 NOTE — FLOWSHEET NOTE
[] [facility]  [address]  [phone]  F: [phone] [] [address]  [address]   [address]  P: [phone]  F: [phone] [x] [address]  Therapy  [address]  P: [phone]  F: [phone] [] [email or web address]  P: [phone]  F: [phone] [] [address]  [facility]   [address]   Kathryn Share: [phone]  F: [phone]      Physical Therapy Daily Treatment Note    Date:  2022  Patient Name:  Nikki Contreras    :  1960  MRN: [id number]  Physician: Destin Interiano MD                             Insurance: [facility] 60 visits per calendar year combined with SLP/PT no copay, no auth required.   Medical Diagnosis: Postmastectomy Lymphedema of left arm       Rehab Codes: I89.0, I97.2  Onset Date: 21   Next Dr. Archibald Loss: ?  Visit# / total visits: 10/18     Cancels/No Shows: 0    Subjective:  Pt reports she received new sleeve and glove, hasn't tried sleeve yet. Has f/u w/ lymphedema soon. States compliant w/ HEP daily, was sick for a week and didn't do much but has resumed. Pain:  [] Yes  [x] No Location: L UE  Pain Rating: (0-10 scale) 0/10  Pain altered Tx:  [x] No  [] Yes  Action:  Comments:     Objective:  Modalities:   Precautions: L BrCa, L mastectomy 23 LN removed , Chemo 1-2012 then radiation. Tamoxifen 5 yrs, anestrozol for 4 yrs-jt pain, no longer taking  Manual: Utilized PORi breast protocol to LEFT upper quarter for gentle manual lymphatic drainage, myofascial release and joint mobility to facilitate better function ROM and dynamics of lymph system.   Exercises:  Exercise Reps/ Time Weight/ Level Comments             Diaphragmatic breathing 5 cycles       Elbow winging function  3. Continue activity to decrease risk factors associated with increased time being sedentary while undergoing chemotherapy, radiation, surgery. 4.   Improve tissue mobility of chest wall and axilla to allow for more normal motion, function and lymphatic mobility and drainage  5. Control/mitigate side effects/late effects of Cancer treatment      Pt. Education:  [x] Yes  [] No  [x] Reviewed Prior HEP/Ed  Method of Education: [x] Verbal  [x] Demo  [] Written  Comprehension of Education:  [x] Verbalizes understanding. [] Demonstrates understanding. [] Needs review. [] Demonstrates/verbalizes HEP/Ed previously given. Plan: [x] Continue current frequency toward long and short term goals.     [] Specific Instructions for subsequent treatments:       Time In: 11:00 pm            Time Out: 12:00 pm     Electronically signed by:  Brannon Darling PT , NEELAM

## 2022-01-05 ENCOUNTER — OFFICE VISIT (OUTPATIENT)
Dept: ORTHOPEDIC SURGERY | Age: 62
End: 2022-01-05
Payer: COMMERCIAL

## 2022-01-05 VITALS — HEIGHT: 65 IN | WEIGHT: 205 LBS | BODY MASS INDEX: 34.16 KG/M2 | RESPIRATION RATE: 16 BRPM

## 2022-01-05 DIAGNOSIS — S83.281A TEAR OF LATERAL MENISCUS OF RIGHT KNEE, UNSPECIFIED TEAR TYPE, UNSPECIFIED WHETHER OLD OR CURRENT TEAR, INITIAL ENCOUNTER: ICD-10-CM

## 2022-01-05 DIAGNOSIS — M17.11 ARTHRITIS OF RIGHT KNEE: Primary | ICD-10-CM

## 2022-01-05 DIAGNOSIS — M25.562 PAIN IN BOTH KNEES, UNSPECIFIED CHRONICITY: ICD-10-CM

## 2022-01-05 DIAGNOSIS — M17.12 ARTHRITIS OF LEFT KNEE: ICD-10-CM

## 2022-01-05 DIAGNOSIS — M25.561 PAIN IN BOTH KNEES, UNSPECIFIED CHRONICITY: ICD-10-CM

## 2022-01-05 PROCEDURE — 99204 OFFICE O/P NEW MOD 45 MIN: CPT | Performed by: ORTHOPAEDIC SURGERY

## 2022-01-05 ASSESSMENT — ENCOUNTER SYMPTOMS
APNEA: 0
CHEST TIGHTNESS: 0
ABDOMINAL PAIN: 0
VOMITING: 0
COUGH: 0

## 2022-01-05 NOTE — PROGRESS NOTES
[address] [facility] AND SPOR[facility] MEDICINE  [address] [facility]  [address] [facility] [address]  Dept: [phone]    Ambulatory Orthopedic Consult      CHIEF COMPLAINT:    Chief Complaint   Patient presents with    Pain     bilateral knee pain       HISTORY OF PRESENT ILLNESS:      The patient is a 63 y.o. female who is being seen at the request of  DOV Quintero CNP for consultation and evaluation of bilateral knee pain. Jim Walden  presents for bilateral knee pain that has been present for approximately 1 year for the right knee. The left knee has been painful for approximately 4-5 months. The patient does not recall a specific injury. The pain improves with  Resting and activity modification. The pain worsens with  stair climbing and arising from a seated position. The patient has pain with any longer distance walking. Instability is  noted. The patient has had a previous corticosteroid injections. The patient reports corticosteroid injection to the right knee is March of 2021 and September of 2021. She had a left knee corticosteroid injection to the left knee as well in September of 2021. The patient has had previous physical therapy for this problem with minimal results. The patient has tried oral NSAIDs for this problem previously. Patient had an MRI of the right knee on 3/11/21 that did show arthritic changes and baker's cyst.     The patient states that she currently sees a rheumatologist and he does not feel it is related. The patient has also seen a handful of other providers for her knee pain.            Past Medical History:    Past Medical History:   Diagnosis Date    Cancer [facility])     Breast:  left mastectomy, chemo radiation    Lymphedema     left arm    RA (rheumatoid arthritis) ([address]        Past Surgical History:    Past Surgical History:   Procedure Laterality Date    MASTECTOMY Current Medications:   Current Outpatient Medications   Medication Sig Dispense Refill    Naproxen Sodium (ALEVE PO) Take 220 mg by mouth 2 times daily      traMADol (ULTRAM) 50 MG tablet Take 1 tablet by mouth every 8 hours as needed for Pain for up to 30 days. Take lowest dose possible to manage pain 90 tablet 0    hydroxychloroquine (PLAQUENIL) 200 MG tablet Take 200 mg by mouth 2 times daily      amLODIPine (NORVASC) 5 MG tablet Take by mouth       No current facility-administered medications for this visit. Allergies:    Patient has no known allergies. Social History:   Social History     Socioeconomic History    Marital status:      Spouse name: Not on file    Number of children: Not on file    Years of education: Not on file    Highest education level: Not on file   Occupational History    Not on file   Tobacco Use    Smoking status: Never Smoker    Smokeless tobacco: Never Used   Substance and Sexual Activity    Alcohol use: Not on file    Drug use: Not on file    Sexual activity: Not on file   Other Topics Concern    Not on file   Social History Narrative    Not on file     Social Determinants of Health     Financial Resource Strain: Low Risk     Difficulty of Paying Living Expenses: Not hard at all   Food Insecurity: No Food Insecurity    Worried About [address] [facility] in the Last Year: Never true    [address] in the Last Year: Never true   Transportation Needs:     Lack of Transportation (Medical): Not on file    Lack of Transportation (Non-Medical):  Not on file   Physical Activity:     Days of Exercise per Week: Not on file    Minutes of Exercise per Session: Not on file   Stress:     Feeling of Stress : Not on file   Social Connections:     Frequency of Communication with Friends and Family: Not on file    Frequency of Social Gatherings with Friends and Family: Not on file    Attends Yazidi Services: Not on file   [employer] or Organizations: Not on file    Attends Club or Organization Meetings: Not on file    Marital Status: Not on file   Intimate Partner Violence:     Fear of Current or Ex-Partner: Not on file    Emotionally Abused: Not on file    Physically Abused: Not on file    Sexually Abused: Not on file   Housing Stability:     Unable to Pay for Housing in the Last Year: Not on file    Number of [place] in the Last Year: Not on file    Unstable Housing in the Last Year: Not on file       Family History:  Family History   Problem Relation Age of Onset    High Blood Pressure Mother     Cancer Father         liver, lung and prostate    Other Sister         [place]    Colon Cancer Maternal Grandmother          REVIEW OF SYSTEMS:  Review of Systems   Constitutional: Negative for chills and fever. Respiratory: Negative for apnea, cough and chest tightness. Cardiovascular: Negative for chest pain and palpitations. Gastrointestinal: Negative for abdominal pain and vomiting. Genitourinary: Negative for difficulty urinating. Musculoskeletal: Positive for arthralgias (bilateral knees). Negative for gait problem, joint swelling and myalgias. Neurological: Negative for dizziness, weakness and numbness. I have reviewed the CC, HPI, ROS, PMH, FHX, Social History, and if not present in this note, I have reviewed in the patient's chart. I agree with the documentation provided by other staff and have reviewed their documentation prior to providing my signature indicating agreement. PHYSICAL EXAM:  Resp 16   Ht 5' 5\" (1.651 m)   Wt 205 lb (93 kg)   BMI 34.11 kg/m²  Body mass index is 34.11 kg/m². Physical Exam  Gen: alert and oriented to person and place. Psych:  Appropriate affect; Appropriate knowledge base; Appropriate mood; No hallucinations; Head: normocephalic, atraumatic   Chest: symmetric chest excursion; nonlabored respiratory effort.   Pelvis: stable; no obvious pelvis deformity  Ortho Exam  Extremity:Evaluation of the Right knee shows no erythema, warmth, skin lesions, signs of infection. mild Knee effusion is appreciated. Patient's range of motion is 5-120 degrees. Tenderness over the lateral joint line is appreciated. Patient has a negative patellar grind sign and a negative patellar apprehension sign. There is no instability with varus and valgus stress applied at 0 and 30° of flexion. A negative anterior drawer and Lachman's test is appreciated. There is increased pain with a lateralMcMurray's test but no palpable click. There is no calf tenderness. There is a negative hip log-roll and Stinchfield test.  Motor, sensory, vascular examination to the Right lower extremity is intact without focal deficits. Fairly benign left knee examination. Radiology:     XR KNEE RIGHT (1-2 VIEWS)    Result Date: 1/5/2022  History:   Right knee pain Findings:   Standing AP/Lateral/Tunnel/Merchant view xrays of the Right done in the office today shows mild to moderate  medial joint space narrowing, tricompartmental osteophytosis, joint line sclerosis medially. No evidence of fracture, subluxation, dislocation, radioopaque foreign body/tumor is noted. Lateral subluxation of the tibia is appreciated. Impression:   Right knee mild to moderate  degenerative changes as described above. XR KNEE LEFT (MIN 4 VIEWS)    Result Date: 1/5/2022  History:   Left knee pain Findings:   Standing AP/Lateral/Tunnel/Merchant view xrays of the Left done in the office today shows mild to moderate medial joint space narrowing, tricompartmental osteophytosis, joint line sclerosis medially. No evidence of fracture, subluxation, dislocation, radioopaque foreign body/tumor is noted. Lateral subluxation of the tibia is appreciated. Impression:   Left knee mild to moderate degenerative changes as described above.     VL DUP LOWER EXTREMITY VENOUS RIGHT    Result Date: 12/7/2021    [facility] Vascular Lower Extremities DVT Study Procedure   Patient Name Aminata Gee Date of Study         12/07/2021               DEEJAY ARNOLD   Date of      1960    Gender                Female  Birth   Age          63 year(s)    Race                     Room Number  op   Corporate ID [id number]  #   Patient Anat Lee [de-identified]  #   MR #         [id number]       Sonographer           Kali Naranjo RVT, RDMS   Accession #  [id number]    Interpreting          [address] [place] [address]                             Physician   Referring                  Referring Physician   Lacretia Libman  Nurse  Practitioner  Procedure Type of Study:   Veins: Lower Extremities DVT Study, Venous Scan Lower Right. Patient Status:Out Patient. Conclusions   Summary   No evidence of superficial or deep venous thrombosis in the right lower  extremity. Baker's cyst noted in the right popliteal fossa. Signature   ----------------------------------------------------------------  Electronically signed by Kali Naranjo RVT, RDMS(Sonographer) on 12/07/2021 08:08 AM  ----------------------------------------------------------------   ----------------------------------------------------------------  Electronically signed by Theone Lehmann Reyes,Arthur(Interpreting  physician) on 12/07/2021 04:38 PM  ----------------------------------------------------------------  Findings:   Right Impression:  The common femoral, femoral, popliteal and tibial veins demonstrate  normal compressibility and augmentation. Normal compressibility of the great saphenous vein. Normal compressibility of the small saphenous vein. Enlarged lymph nodes are noted at the right groin level and in the  popliteal fossa. Large hypoechoic area with no color flow in the medial popliteal fossa  that extends down the calf measuring 8.3 x 1.93 x 7.39 cm.   Risk Factors History +---------+----+-----------------------------------------------------------+ ! Diagnosis! Date! Comments                                                   ! +---------+----+-----------------------------------------------------------+ ! Other    !    !h/o left breast cancer 5-21; lymphedema left arm           ! +---------+----+-----------------------------------------------------------+ ! Other    ! ! Rheumatoid arthritis                                       ! +---------+----+-----------------------------------------------------------+   - The patient's risk factor(s) include: arterial hypertension. Velocities are measured in cm/s ; Diameters are measured in cm Right Lower Extremities DVT Study Measurements Right 2D Measurements +------------------------------------+----------+---------------+----------+ ! Location                            ! Visualized! Compressibility! Thrombosis! +------------------------------------+----------+---------------+----------+ ! Common Femoral                      !Yes       ! Yes            ! None      ! +------------------------------------+----------+---------------+----------+ ! Prox Femoral                        !Yes       ! Yes            ! None      ! +------------------------------------+----------+---------------+----------+ ! Mid Femoral                         !Yes       ! Yes            ! None      ! +------------------------------------+----------+---------------+----------+ ! Dist Femoral                        !Yes       ! Yes            ! None      ! +------------------------------------+----------+---------------+----------+ ! Popliteal                           !Yes       ! Yes            ! None      ! +------------------------------------+----------+---------------+----------+ ! Sapheno Femoral Junction            ! Yes       ! Yes            ! None      ! +------------------------------------+----------+---------------+----------+ ! PTV                                 ! Yes       ! Yes !None      ! +------------------------------------+----------+---------------+----------+ ! Peroneal                            !Partial   !Yes            ! None      ! +------------------------------------+----------+---------------+----------+ ! Gastroc                             ! Yes       ! Yes            ! None      ! +------------------------------------+----------+---------------+----------+ ! GSV Thigh                           ! Yes       ! Yes            ! None      ! +------------------------------------+----------+---------------+----------+ ! GSV Knee                            ! Yes       ! Yes            ! None      ! +------------------------------------+----------+---------------+----------+ ! GSV Ankle                           ! Yes       ! Yes            ! None      ! +------------------------------------+----------+---------------+----------+ ! SSV                                 ! Yes       ! Yes            ! None      ! +------------------------------------+----------+---------------+----------+ Right Doppler Measurements +---------------------------+------+------+--------------------------------+ ! Location                   ! Signal!Reflux! Reflux (msec)                   ! +---------------------------+------+------+--------------------------------+ ! Common Femoral             !Phasic!      !                                ! +---------------------------+------+------+--------------------------------+ ! Prox Femoral               !Phasic!      !                                ! +---------------------------+------+------+--------------------------------+ ! Popliteal                  !Phasic!      !                                ! +---------------------------+------+------+--------------------------------+ Left Lower Extremities DVT Study Measurements Left Doppler Measurements +----------------------------+------+------+-------------------------------+ ! Location                    ! Signal!Reflux! Reflux (msec) ! +----------------------------+------+------+-------------------------------+ ! Common Femoral              !Phasic!      !                               ! +----------------------------+------+------+-------------------------------+        ASSESSMENT:     1. Arthritis of right knee    2. Tear of lateral meniscus of right knee, unspecified tear type, unspecified whether old or current tear, initial encounter    3. Arthritis of left knee    4. Pain in both knees, unspecified chronicity         PLAN:       Reviewed x-rays with the patient today in the office. Discussed etiology and natural history of bilateral knee arthritis and possible meniscal tear to the right knee. The treatment options may include oral anti-inflammatories, bracing, injections, advanced imaging, activity modification, physical therapy and/or surgical intervention. Discussed with the patient that since she has tried and failed most non-operative treatment I think a gel injection would benefit her greatly. The patient would like to proceed with getting a prior authorization for Durolane or a single shot Gel injection. The patient will follow up in the office after approval.  We discussed that the patient should call us with any concerns or questions. Return if symptoms worsen or fail to improve. No orders of the defined types were placed in this encounter. Orders Placed This Encounter   Procedures    XR KNEE LEFT (MIN 4 VIEWS)     Standing Status:   Future     Number of Occurrences:   1     Standing Expiration Date:   1/5/2023    XR KNEE RIGHT (1-2 VIEWS)     Standing Status:   Future     Number of Occurrences:   1     Standing Expiration Date:   1/5/2023     ISylvester LPN am scribing for and in the presence of Dr. Rakesh Vines  1/5/2022 12:51 PM      I have reviewed and made changes accordingly to the work scribed by Sylvester Waller LPN.   The documentation accurately reflects work and decisions made by me. I have also reviewed documentation completed by clinical staff.     Enma Davenport DO, [facility]  1/5/2022 12:52 PM      This note is created with the assistance of a speech recognition program.  While intending to generate a document that actually reflects the content of the visit, the document can still have some errors including those of syntax and sound a like substitutions which may escape proof reading.  In such instances, actual meaning can be extrapolated by contextual diversion      Electronically signed by Kvng Ocampo DO, FAOAO on 1/5/2022 at 12:51 PM

## 2022-01-13 ENCOUNTER — HOSPITAL ENCOUNTER (OUTPATIENT)
Dept: PHYSICAL THERAPY | Facility: CLINIC | Age: 62
Setting detail: THERAPIES SERIES
Discharge: HOME | End: 2022-01-13
Payer: COMMERCIAL

## 2022-01-13 PROCEDURE — 97110 THERAPEUTIC EXERCISES: CPT

## 2022-01-13 PROCEDURE — 97140 MANUAL THERAPY 1/> REGIONS: CPT

## 2022-01-13 NOTE — FLOWSHEET NOTE
[] [facility]  [address]  [phone]  F: [phone] [] [address]  [facility]   [address]  P: [phone]  F: [phone] [x] [address] &  Therapy  [address]  P: [phone]  F: [phone] [] [address]  P: [phone]  F: [phone] [] [address]  [facility]   [address]   [place]: [phone]  F: [phone]      Physical Therapy Daily Treatment Note    Date:  2022  Patient Name:  Isaiah Marshall    :  1960  MRN: [id number]  Physician: Lamar Alexander MD                             Insurance: [facility] 60 visits per calendar year combined with SLP/PT no copay, no auth required.   Medical Diagnosis: Postmastectomy Lymphedema of left arm       Rehab Codes: I89.0, I97.2  Onset Date: 21   Next Dr. Campo Erb: ?  Visit# / total visits:      Cancels/No Shows: 0    Subjective:  Pt reports she tried new sleeve and glove, feels tight but will consult w/ lymphedema next week. States her chest wall and scar feels softer and more mobile. Pain:  [] Yes  [x] No Location: L UE  Pain Rating: (0-10 scale) 0/10  Pain altered Tx:  [x] No  [] Yes  Action:  Comments:      Objective:  Modalities:   Precautions: L BrCa, L mastectomy 23 LN removed , Chemo 1-2012 then radiation. Tamoxifen 5 yrs, anestrozol for 4 yrs-jt pain, no longer taking  Manual: Utilized PORi breast protocol to LEFT upper quarter for gentle manual lymphatic drainage, myofascial release and joint mobility to facilitate better function ROM and dynamics of lymph system.   Exercises:  Exercise Reps/ Time Weight/ Level Comments             Diaphragmatic breathing 5 cycles       Elbow winging supine 1m  HEP     Wand flexion  10x  HEP     Bye, bye ex 3x   Flex, abd          post shld rolls, shrugs  10x       Scapular retraction  10x 5 sec      wall slide  10x HEP flex    pec stretch at door  3x30   varied arm position          SL scap Abd, ER, HAB 10x     OH flex at wall 10x     Wall angels 10x           UE tband: ext, row, ER, tricep, bicep 10-15x ea Lime            Other: L shoulder AROM:  161° pulling in axilla before RX,165°  ULTT:L -       Treatment Charges: Mins Units   []  Modalities     []  Ther Exercise 10 1   [x]  Manual Therapy 45 3   []  Ther Activities     []  Aquatics     []  Vasocompression     []  Other     Total Treatment time 55        Assessment: [x] Progressing toward goals. Cont'd w/ PORI breast protocol to L upper quarter. Pt maintaining ROM well and improving scar tissue mobility. Cont's to demo decr fullness throughout L UE lis upper arm. Again focused on manual/MFR to chest wall. Rev HEP and completed per log w/ good jaydon and demo. Discussed trial of handweights next visit 2° difficulty holding band and has weights at home. Emphasized pertinent ex to complete daily and importance of HEP as well as postural awareness. Will cont bi-weekly for manual and ex progression as jaydon. [] No change. [] Other:  [x] Patient would continue to benefit from skilled physical therapy services in order to: address the following goals    STG: (to be met in 12 treatments)  1. ? Pain: Stabilize shoulder pain and ensure optimal management of pain during all ADL's (home, occupation, community and recreation)- MET  2. Optimize AROM of bilateral shoulders for functional activity to improve QOL.- MET, was 121° at eval now 165°  3. Increase strength in bilateral shoulders and scapula to grossly 5/5 and L  by 3+ lbs for good postural stability and functional tasks  4. Independent with Home Exercise Programs  5.  Education on signs and symptoms, precautions regarding lymphedema- MET         LTG: (to be met in 25 treatments)  1. Pt to report improved sleep  2. Decr BFI score by 1 for improved jaydon to activity and overall increased function  3. Continue activity to decrease risk factors associated with increased time being sedentary while undergoing chemotherapy, radiation, surgery. 4.   Improve tissue mobility of chest wall and axilla to allow for more normal motion, function and lymphatic mobility and drainage  5. Control/mitigate side effects/late effects of Cancer treatment      Pt. Education:  [x] Yes  [] No  [x] Reviewed Prior HEP/Ed  Method of Education: [x] Verbal  [x] Demo  [] Written  Comprehension of Education:  [x] Verbalizes understanding. [] Demonstrates understanding. [] Needs review. [] Demonstrates/verbalizes HEP/Ed previously given. Plan: [x] Continue current frequency toward long and short term goals.     [] Specific Instructions for subsequent treatments:        Time In: 2:30 pm            Time Out: 3:30 pm     Electronically signed by:  Daryl Olivares, PT , NEELAM

## 2022-01-18 ENCOUNTER — HOSPITAL ENCOUNTER (OUTPATIENT)
Dept: OCCUPATIONAL THERAPY | Age: 62
Setting detail: THERAPIES SERIES
Discharge: HOME | End: 2022-01-18
Payer: COMMERCIAL

## 2022-01-18 PROCEDURE — 97535 SELF CARE MNGMENT TRAINING: CPT

## 2022-01-18 NOTE — FLOWSHEET NOTE
TREATMENT LOCATION:   [] [address]: [phone]  F: [phone] [x] [address]: [phone]  F: [phone]        Lymphedema Services - Progress Note of the Upper Extremity    Date:  2022  Patient: Christopher Garcia  : 1960             MRN: [id number]  Referring Physician: Burgess Delgado MD       Phone: [phone]  Fax: [phone]  Insurance:  [facility] - 60 visits per calendar year she has 54 left combined with SLP/PT no copay, no auth required. Medical Diagnosis: Postmastectomy lymphedema syndrome  Rehab Codes: I89.0  Onset Date: 2021   Visit# / total visits: 14/15  POC UPDATE DUE AT VISIT: 21    Plan for next session:   Determine need for additional compression items, provide with arm sleeve, MLD    Absolute Lymphedema Contraindications - treatement [x]? NONE    Absolute Contraindications Regarding the Deep Abdomen: [x]? NONE   Relative Lymphedema Contraindications [x]? NONE      Subjective:. \" I really feel like I am starting to get better \"       Pain: [] YES    [x] NO     Location: 0      Pain Rating: ( 0-10 scale) : 0/10  Comments:       Objective:   [x] Measurement [] Bandaging [x] MLD [] Skin care   [] Education [] Self-bandaging [] Self-MLD [] Wound Care   [] Exercise [] Caregiver training [] Kinesiotaping [] Other:    [] Nutrition [x] Garment fitting/training [] Vasopneumatic Pump        Circumferential Measurements   Measurements taken from nail base of D3 digit. Fingers are measured at the base.    Measurements (cm) Right Left   D1        D2         D3        D4        D5          Dorsum   11 20.0 20.0   Wrist   16 16.4 18.0   Lower Forearm  25 19.3 23.5   Upper Forearm  34 25.5 28.7   Olecranon   40 25.4 26.5   Lower Bicep  48 29.0 30.5   Upper Bicep  57 32.0 31.0   Current:     9/21/21     8/5/21    8/3/21 7/29/21    7/22/21    7/20/21    7/15/21    7/13/21    7/8/21    7/6/21     7/1/2021    Initial Total 6/15/2021:   X    X    X    X    X    X    X    X    X    X    X    X    167.6 178.2    180.5    184.7    189.3    187.7    184.0    187. 2    191.8    186.7    193.0    193.2    190.5    205.4       Assessment :  [x] Progressing toward goals. Pt arrives to today's treatment with arm sleeve in place on the L UE, with the Velcro device over top of it and the gauntlet in place for her hand. She states that she feels her arm is doing even better than it previously was, however she continues to get pockets of swelling. Pt states that she continues to alternate between the velcro devices, with the use of tg  underneath and the arm sleeves with the gauntlet daytime vs nighttime. New measurements are taken and noted above. Pt received her new [employer] Elvarex compression devices of which appears to be mis-fitted in several locations. Pt is strongly encouraged to reach back out to ordering facility to have this corrected. Pt also request to review the various compression garments that would be of benefit to her to purchase in the future for better containment of her edema. OT educated on the difference between flat knit and circular knit garments. She states that she would like to move forward with purchasing a circular knit compression arm sleeve while she awaits for her arm / hand to reduce more in size. Pt selects the following and measurements were taken for new arm sleeve: Bear Licea and Blaise Sal - pt is provided with measurements to both items. She states that she will think about this in more detail before placing her order and returning for a follow up visit.      Pt started PT services on Monday 9/27/21    Post-treatment symptom assessment for swelling, heaviness, tightness to the affected limb, chest or truncal area:                            [] No change   [x] Improving   [x] Patient would continue to benefit from skilled occupational therapy services in order to address the following goals                Therapy Goals:   STG - To be addressed within 3 visits     Pt will demonstrate compliance of maintaining lymphedema precautions to reduce the risks of infection and further exacerbations. - GOAL MET 7/22/21     Pt will demonstrate independence with decongestive exercise program in order to expedite fluid rerouting.- GOAL MET 7/22/21        LTG To be adressed within 8 visits      Pt will demonstrate competence with SELF-MLD ( manual lymphatic drainage) in order to reroute lymphatic pathways for decreased swelling. - GOAL MET 7/22/21     Pt/Caregiver will demonstrate independence with donning/doffing and wearing schedule for compression garments/ devices to maintain decreased size upon discharge. - ONGOING - 7/22/21     Pt will demonstrate compliance with skin care routine for improved overall skin integrity and decreased risk of wounds and infection. GOAL MET 7/22/21     Pt to compliant with CDT in order to reduce edema in the L UE by 3+ cm. - ONGOING- 7/22/21     Pt will demonstrate independence with after breast surgery stretches (corner stretch, butterfly stretch, side arm stretch) in order to increase ease with AROM. ONGOING 7/22/21     Patient's Goal: \" I would like to get my arm down in size, find something that works and hopefully get this surgery. \"     Response to Education Provided:  [x] Verbalized understanding   [x] Demonstrates/verbalizes understanding of home program/edu previously given     [x] Needs continued review            [] No understanding   Learner(s): [x] Patient  [] Spouse [] Family [] Other:   Method(s): [x] Verbal [x] Demo [x] Handouts     Knowledge of home program:  [x] Good [x] Fair [] Poor [] With assist from family/caregiver    FREQUENCY: Pt will be seen 2 x/ week for an addtional 9 visits and will follow up as appropriate.  Focus is to remain on short and long term goals as listed above.      Treatment Charges   Minutes   Units   Evaluation (13834)                                                $95.15 / $75.40            Low            Moderate            High     Manual Therapy (67807):                                      $26.92 / $21.34     Therapeutic activities (43126):                             $37.46/ $26.79     Therapeutic Exercise (38034)                              $29.21/$ 22.84     Self care/home mgmt (65964)                              $32.39 / $24.43 60 4   Other: Vasopneumatic Pump     Total Treatment Time    60 4       Time In: 11:00 Time Out: 12:00      Electronically signed by Margoth Haro OT on 1/18/2022 at 11:09 AM

## 2022-01-20 ENCOUNTER — TELEPHONE (OUTPATIENT)
Dept: PRIMARY CARE CLINIC | Age: 62
End: 2022-01-20

## 2022-01-20 DIAGNOSIS — M25.50 ARTHRALGIA, UNSPECIFIED JOINT: Primary | ICD-10-CM

## 2022-01-21 ENCOUNTER — HOSPITAL ENCOUNTER (OUTPATIENT)
Age: 62
Setting detail: SPECIMEN
Discharge: HOME | End: 2022-01-21

## 2022-01-21 DIAGNOSIS — R79.89 ELEVATED LFTS: ICD-10-CM

## 2022-01-21 DIAGNOSIS — M25.50 ARTHRALGIA, UNSPECIFIED JOINT: ICD-10-CM

## 2022-01-21 LAB
ALBUMIN SERPL-MCNC: 4.1 G/DL (ref 3.5–5.2)
ALBUMIN/GLOBULIN RATIO: 1.1 (ref 1–2.5)
ALP BLD-CCNC: 122 U/L (ref 35–104)
ALT SERPL-CCNC: 17 U/L (ref 5–33)
ANION GAP SERPL CALCULATED.3IONS-SCNC: 17 MMOL/L (ref 9–17)
AST SERPL-CCNC: 22 U/L
BILIRUB SERPL-MCNC: 0.35 MG/DL (ref 0.3–1.2)
BUN BLDV-MCNC: 8 MG/DL (ref 6–20)
BUN/CREAT BLD: ABNORMAL (ref 9–20)
CALCIUM SERPL-MCNC: 9.6 MG/DL (ref 8.6–10.4)
CHLORIDE BLD-SCNC: 101 MMOL/L (ref 98–107)
CO2: 22 MMOL/L (ref 20–31)
CREAT SERPL-MCNC: 0.77 MG/DL (ref 0.5–0.9)
GFR AFRICAN AMERICAN: >60 ML/MIN
GFR NON-AFRICAN AMERICAN: >60 ML/MIN
GFR SERPL CREATININE-BSD FRML MDRD: ABNORMAL ML/MIN/{1.73_M2}
GFR SERPL CREATININE-BSD FRML MDRD: ABNORMAL ML/MIN/{1.73_M2}
GLUCOSE BLD-MCNC: 89 MG/DL (ref 70–99)
POTASSIUM SERPL-SCNC: 3.9 MMOL/L (ref 3.7–5.3)
SODIUM BLD-SCNC: 140 MMOL/L (ref 135–144)
TOTAL PROTEIN: 7.7 G/DL (ref 6.4–8.3)
URIC ACID: 5.1 MG/DL (ref 2.4–5.7)

## 2022-01-22 DIAGNOSIS — M06.9 RHEUMATOID ARTHRITIS INVOLVING BOTH HANDS, UNSPECIFIED WHETHER RHEUMATOID FACTOR PRESENT (HCC): ICD-10-CM

## 2022-01-22 DIAGNOSIS — R74.8 ELEVATED ALKALINE PHOSPHATASE LEVEL: Primary | ICD-10-CM

## 2022-01-25 ENCOUNTER — HOSPITAL ENCOUNTER (OUTPATIENT)
Age: 62
Setting detail: SPECIMEN
Discharge: HOME | End: 2022-01-25

## 2022-01-25 ENCOUNTER — HOSPITAL ENCOUNTER (OUTPATIENT)
Dept: PHYSICAL THERAPY | Facility: CLINIC | Age: 62
Setting detail: THERAPIES SERIES
Discharge: HOME | End: 2022-01-25
Payer: COMMERCIAL

## 2022-01-25 DIAGNOSIS — M06.9 RHEUMATOID ARTHRITIS INVOLVING BOTH HANDS, UNSPECIFIED WHETHER RHEUMATOID FACTOR PRESENT (HCC): ICD-10-CM

## 2022-01-25 LAB
GGT: 7 U/L (ref 5–36)
PTH INTACT: 39.36 PG/ML (ref 15–65)

## 2022-01-25 PROCEDURE — 97110 THERAPEUTIC EXERCISES: CPT

## 2022-01-25 PROCEDURE — 97140 MANUAL THERAPY 1/> REGIONS: CPT

## 2022-01-25 NOTE — FLOWSHEET NOTE
[] [address]  [address]  [phone]  F: [phone] [] [address]  [facility] 36   [address]  P: [phone]  F: [phone] [x] [address] &  Therapy  [address] [facility]  P: [phone]  F: [phone] [] [address]  P: [phone]  F: [phone] [] [address]  [facility]   [address]   [place]: [phone]  F: [phone]      Physical Therapy Daily Treatment Note    Date:  2022  Patient Name:  Josiane Wilkerson    :  1960  MRN: [id number]  Physician: Jose Cisneros MD                             Insurance: [employer] 60 visits per calendar year combined with SLP/PT no copay, no auth required.   Medical Diagnosis: Postmastectomy Lymphedema of left arm       Rehab Codes: I89.0, I97.2  Onset Date: 21   Next Dr. Emanuel Blood: ?  Visit# / total visits:      Cancels/No Shows: 0    Subjective:  Pt reports she saw lymphedema last week and measurements are down, pleased. New sleeve is a little big so going tomorrow to have re fit. Notes her Liver enzymes were elevated, having US later this week and has been weighing heavy on her mind. Started Acupuncture for knee last week. Pain:  [] Yes  [x] No Location: L UE  Pain Rating: (0-10 scale) 0/10  Pain altered Tx:  [x] No  [] Yes  Action:  Comments:      Objective:  Modalities:   Precautions: L BrCa, L mastectomy 23 LN removed , Chemo 1-2012 then radiation. Tamoxifen 5 yrs, anestrozol for 4 yrs-jt pain, no longer taking  Manual: Utilized PORi breast protocol to LEFT upper quarter for gentle manual lymphatic drainage, myofascial release and joint mobility to facilitate better function ROM and dynamics of lymph system.   Exercises:  Exercise Reps/ Time Weight/ Level Comments             Diaphragmatic breathing 5 cycles       Elbow winging supine 1m  HEP     Wand flexion  10x  HEP     Bye, bye ex 3x   Flex, abd          post shld rolls, shrugs  10x       Scapular retraction  10x 5 sec      wall slide  10x HEP flex    pec stretch at door  3x30   varied arm position          SL scap Abd, ER, HAB 10x     OH flex at wall 10x     Wall angels 10x           UE tband: ext, row, ER, tricep, bicep 10-15x ea Lime NT   Free weight: bicep, 3 way fly, bicep, tricep 10x ea 2#          Standing        Other: L shoulder AROM:  159° pulling in axilla before RX,165°  ULTT:L -       Treatment Charges: Mins Units   []  Modalities     []  Ther Exercise 15 1   [x]  Manual Therapy 40 3   []  Ther Activities     []  Aquatics     []  Vasocompression     []  Other     Total Treatment time 55        Assessment: [x] Progressing toward goals. Cont'd w/ PORI breast protocol to L upper quarter. Pt maintaining ROM well and improving scar tissue mobility. Rev HEP and completed per log w/ good jaydon and demo. Trial of hand weights today 2° difficulty holding band and has weights at home, jaydon well. Issued HO for HEP. Emphasized pertinent ex to complete daily and importance of HEP as well as postural awareness and monitoring lymphedema. Will cont bi-weekly for manual and ex progression as jaydon. [] No change. [] Other:  [x] Patient would continue to benefit from skilled physical therapy services in order to: address the following goals    STG: (to be met in 12 treatments)  1. ? Pain: Stabilize shoulder pain and ensure optimal management of pain during all ADL's (home, occupation, community and recreation)- MET  2. Optimize AROM of bilateral shoulders for functional activity to improve QOL.- MET, was 121° at eval now 165°  3. Increase strength in bilateral shoulders and scapula to grossly 5/5 and L  by 3+ lbs for good postural stability and functional tasks  4.  Independent with Home Exercise Programs  5. Education on signs and symptoms, precautions regarding lymphedema- MET         LTG: (to be met in 18 treatments)  1. Pt to report improved sleep  2. Decr BFI score by 1 for improved jaydon to activity and overall increased function  3. Continue activity to decrease risk factors associated with increased time being sedentary while undergoing chemotherapy, radiation, surgery. 4.   Improve tissue mobility of chest wall and axilla to allow for more normal motion, function and lymphatic mobility and drainage  5. Control/mitigate side effects/late effects of Cancer treatment      Pt. Education:  [x] Yes  [] No  [x] Reviewed Prior HEP/Ed  Method of Education: [x] Verbal  [x] Demo  [] Written  Comprehension of Education:  [x] Verbalizes understanding. [] Demonstrates understanding. [] Needs review. [] Demonstrates/verbalizes HEP/Ed previously given. Plan: [x] Continue current frequency toward long and short term goals.     [] Specific Instructions for subsequent treatments:        Time In: 1100 am           Time Out: 1200 pm    Electronically signed by:  Shireen Sandoval, PT , NEELAM

## 2022-01-28 ENCOUNTER — HOSPITAL ENCOUNTER (OUTPATIENT)
Dept: ULTRASOUND IMAGING | Age: 62
Discharge: HOME | End: 2022-01-30
Payer: COMMERCIAL

## 2022-01-28 DIAGNOSIS — R74.8 ELEVATED ALKALINE PHOSPHATASE LEVEL: ICD-10-CM

## 2022-01-28 PROCEDURE — 76705 ECHO EXAM OF ABDOMEN: CPT

## 2022-02-10 ENCOUNTER — HOSPITAL ENCOUNTER (OUTPATIENT)
Dept: PHYSICAL THERAPY | Facility: CLINIC | Age: 62
Setting detail: THERAPIES SERIES
Discharge: HOME | End: 2022-02-10
Payer: COMMERCIAL

## 2022-02-10 NOTE — FLOWSHEET NOTE
[] [facility]) - [facility] &  Therapy  [address]    [phone]  F: [phone]   [] [address]   [address]  P: [phone]  F: [phone]  [] [address]  Therapy  [address] [facility]  P: [phone]  F: [phone] [] [address]  P: [phone]  F: [phone]  [] [address]  [facility]   [address]   [place]: [phone]  F: [phone]   [] [address]  [place]: [phone]   F: [phone]     Physical Therapy Cancel/No Show note    Date: 2/10/2022  Patient: Joseph Saleh  : 1960  MRN: [id number]    Cancels/No Shows to date:     For today's appointment patient:    []  Cancelled    [] Rescheduled appointment    [x] No-show     Reason given by patient:    []  Patient ill    []  Conflicting appointment    [] No transportation      [] Conflict with work    [] No reason given    [] Weather related    [] [id number]    [] Other:      Comments:        [] Next appointment was confirmed    Electronically signed by: Kishan Delcid PT

## 2022-02-24 ENCOUNTER — HOSPITAL ENCOUNTER (OUTPATIENT)
Dept: PHYSICAL THERAPY | Facility: CLINIC | Age: 62
Setting detail: THERAPIES SERIES
Discharge: HOME | End: 2022-02-24
Payer: COMMERCIAL

## 2022-02-24 PROCEDURE — 97140 MANUAL THERAPY 1/> REGIONS: CPT

## 2022-02-24 PROCEDURE — 97110 THERAPEUTIC EXERCISES: CPT

## 2022-02-24 NOTE — FLOWSHEET NOTE
[] [address]  [address]  [phone]  F: [phone] [] [address]  [facility] 36   [address]  P: [phone]  F: [phone] [x] [address]  Therapy  [address] [facility]  P: [phone]  F: [phone] [] [address]  P: [phone]  F: [phone] [] [address]  [facility]   [address]   [place]: [phone]  F: [phone]      Physical Therapy Daily Treatment Note    Date:  2022  Patient Name:  Tony Lomeli    :  1960  MRN: [id number]  Physician: Gretta Coffey MD                             Insurance: [facility] 60 visits per calendar year combined with SLP/PT no copay, no auth required.   Medical Diagnosis: Postmastectomy Lymphedema of left arm       Rehab Codes: I89.0, I97.2  Onset Date: 21   Next Dr. Patel Peralta: ?  Visit# / total visits:      Cancels/No Shows: 0    Subjective:  Pt reports she received new sleeve, hasn't been wearing it a lot needs to get used to it. Overall feeling good, notes less swelling ands stays down longer lis hand. Using hand wts at home regularly w/ good jaydon and compliant w/ HEP. Pain:  [] Yes  [x] No Location: L UE  Pain Rating: (0-10 scale) 0/10  Pain altered Tx:  [x] No  [] Yes  Action:  Comments:      Objective:  Modalities:   Precautions: L BrCa, L mastectomy 23 LN removed , Chemo 1-2012 then radiation. Tamoxifen 5 yrs, anestrozol for 4 yrs-jt pain, no longer taking  Manual: Utilized PORi breast protocol to LEFT upper quarter for gentle manual lymphatic drainage, myofascial release and joint mobility to facilitate better function ROM and dynamics of lymph system.   Exercises:  Exercise Reps/ Time Weight/ Level Comments           for improved jaydon to activity and overall increased function  3. Continue activity to decrease risk factors associated with increased time being sedentary while undergoing chemotherapy, radiation, surgery. 4.   Improve tissue mobility of chest wall and axilla to allow for more normal motion, function and lymphatic mobility and drainage  5. Control/mitigate side effects/late effects of Cancer treatment      Pt. Education:  [x] Yes  [] No  [x] Reviewed Prior HEP/Ed  Method of Education: [x] Verbal  [x] Demo  [] Written  Comprehension of Education:  [x] Verbalizes understanding. [] Demonstrates understanding. [] Needs review. [] Demonstrates/verbalizes HEP/Ed previously given. Plan: [x] Continue current frequency toward long and short term goals.     [] Specific Instructions for subsequent treatments:        Time In: 1200 pm           Time Out: 1300 pm    Electronically signed by:  Roman Hall PT , NEELAM

## 2022-03-07 ENCOUNTER — HOSPITAL ENCOUNTER (OUTPATIENT)
Dept: PHYSICAL THERAPY | Facility: CLINIC | Age: 62
Setting detail: THERAPIES SERIES
Discharge: HOME | End: 2022-03-07
Payer: COMMERCIAL

## 2022-03-07 PROCEDURE — 97140 MANUAL THERAPY 1/> REGIONS: CPT

## 2022-03-07 PROCEDURE — 97110 THERAPEUTIC EXERCISES: CPT

## 2022-03-07 NOTE — FLOWSHEET NOTE
[] [address]  [address]  [phone]  F: [phone] [] [address]  [facility] 36   [address]  P: [phone]  F: [phone] [x] [email or web address] &  Therapy  [address] [facility] [address]  P: [phone]  F: [phone] [] [address]  P: [phone]  F: [phone] [] [address]  [facility]   [address]   [place]: [phone]  F: [phone]      Physical Therapy Daily Treatment Note    Date:  3/7/2022  Patient Name:  Gabe Gaspar    :  1960  MRN: [id number]  Physician: Divina Naik MD                             Insurance: [employer] 60 visits per calendar year combined with SLP/PT no copay, no auth required.   Medical Diagnosis: Postmastectomy Lymphedema of left arm       Rehab Codes: I89.0, I97.2  Onset Date: 21   Next Dr. Karin Schulte: ?  Visit# / total visits:      Cancels/No Shows: 0    Subjective:  Pt reports she hasn't been wearing her new sleeve much doesn't really care for it. Notes overall has been feeling good w/ less swelling and stays down longer lis hand. Using home pump prn and wearing compression daily. Has been using hand wts at home regularly w/ good jaydon and compliant w/ HEP. Pain:  [] Yes  [x] No Location: L UE  Pain Rating: (0-10 scale) 0/10  Pain altered Tx:  [x] No  [] Yes  Action:  Comments:      Objective:  Modalities:   Precautions: L BrCa, L mastectomy 23 LN removed , Chemo 1-2012 then radiation.  Tamoxifen 5 yrs, anestrozol for 4 yrs-jt pain, no longer taking  Manual: Utilized PORi breast protocol to LEFT upper quarter for gentle manual lymphatic drainage, myofascial release and joint mobility to facilitate better function ROM and dynamics of lymph system. Exercises:  Exercise Reps/ Time Weight/ Level Comments             Diaphragmatic breathing 5 cycles       Elbow winging supine 1m  HEP     Wand flexion  10x  HEP     Bye, bye ex 3x   Flex, abd          post shld rolls, shrugs  10x       Scapular retraction  10x 5 sec      wall slide  10x HEP flex    pec stretch at door  3x30   varied arm position          SL scap Abd, ER, HAB 10x     OH flex at wall 10x     Wall angels 10x           UE tband: ext, row, ER, tricep, bicep 10-15x ea Lime NT   Free weight: bicep, 3 way fly, bicep, tricep 10x ea 2#          Core stab:   *    Standing  *                  Other: L shoulder AROM:  160° pulling in axilla before RX,165°  ULTT:L -       Treatment Charges: Mins Units   []  Modalities     []  Ther Exercise 10 1   [x]  Manual Therapy 45 3   []  Ther Activities     []  Aquatics     []  Vasocompression     []  Other     Total Treatment time 55        Assessment: [x] Progressing toward goals. Cont'd w/ PORI breast protocol to L upper quarter. Pt maintaining ROM well and improving scar and chest wall tissue mobility. Rev HEP and emphasized pertinent ex to complete daily as well as cont'd postural awareness and monitoring lymphedema. Discussed MLD at home-advised to review w/ lymphedema specialist but educated and issued HO of short MLD for maintenance. Will cont bi-weekly for manual and ex progression as jaydon. [] No change. [] Other:  [x] Patient would continue to benefit from skilled physical therapy services in order to: address the following goals    STG: (to be met in 12 treatments)  1. ? Pain: Stabilize shoulder pain and ensure optimal management of pain during all ADL's (home, occupation, community and recreation)- MET  2. Optimize AROM of bilateral shoulders for functional activity to improve QOL.- MET, was 121° at eval now 165°  3.  Increase strength in bilateral shoulders and scapula to grossly 5/5 and L  by 3+ lbs for good postural stability and functional tasks  4. Independent with Home Exercise Programs  5. Education on signs and symptoms, precautions regarding lymphedema- MET         LTG: (to be met in 18 treatments)  1. Pt to report improved sleep  2. Decr BFI score by 1 for improved jaydon to activity and overall increased function  3. Continue activity to decrease risk factors associated with increased time being sedentary while undergoing chemotherapy, radiation, surgery. 4.   Improve tissue mobility of chest wall and axilla to allow for more normal motion, function and lymphatic mobility and drainage  5. Control/mitigate side effects/late effects of Cancer treatment      Pt. Education:  [x] Yes  [] No  [x] Reviewed Prior HEP/Ed  Method of Education: [x] Verbal  [x] Demo  [] Written  Comprehension of Education:  [x] Verbalizes understanding. [] Demonstrates understanding. [] Needs review. [] Demonstrates/verbalizes HEP/Ed previously given. Plan: [x] Continue current frequency toward long and short term goals.     [] Specific Instructions for subsequent treatments:        Time In: 1100 am           Time Out: 1205 pm    Electronically signed by:  Tony Jolley PT , NEELAM

## 2022-03-15 ENCOUNTER — HOSPITAL ENCOUNTER (OUTPATIENT)
Age: 62
Setting detail: SPECIMEN
Discharge: HOME | End: 2022-03-15

## 2022-03-15 ENCOUNTER — OFFICE VISIT (OUTPATIENT)
Dept: PRIMARY CARE CLINIC | Age: 62
End: 2022-03-15
Payer: COMMERCIAL

## 2022-03-15 VITALS
SYSTOLIC BLOOD PRESSURE: 124 MMHG | WEIGHT: 204.4 LBS | HEART RATE: 100 BPM | RESPIRATION RATE: 16 BRPM | BODY MASS INDEX: 34.01 KG/M2 | OXYGEN SATURATION: 96 % | DIASTOLIC BLOOD PRESSURE: 88 MMHG

## 2022-03-15 DIAGNOSIS — M25.50 ARTHRALGIA, UNSPECIFIED JOINT: ICD-10-CM

## 2022-03-15 DIAGNOSIS — E66.09 CLASS 1 OBESITY DUE TO EXCESS CALORIES WITH SERIOUS COMORBIDITY AND BODY MASS INDEX (BMI) OF 34.0 TO 34.9 IN ADULT: ICD-10-CM

## 2022-03-15 DIAGNOSIS — Z12.11 SCREENING FOR COLON CANCER: ICD-10-CM

## 2022-03-15 DIAGNOSIS — R74.8 ELEVATED ALKALINE PHOSPHATASE LEVEL: ICD-10-CM

## 2022-03-15 DIAGNOSIS — I10 PRIMARY HYPERTENSION: Primary | ICD-10-CM

## 2022-03-15 DIAGNOSIS — M06.9 RHEUMATOID ARTHRITIS INVOLVING BOTH HANDS, UNSPECIFIED WHETHER RHEUMATOID FACTOR PRESENT (HCC): ICD-10-CM

## 2022-03-15 LAB
ALBUMIN SERPL-MCNC: 4.6 G/DL (ref 3.5–5.2)
ALBUMIN/GLOBULIN RATIO: 1.9 (ref 1–2.5)
ALP BLD-CCNC: 119 U/L (ref 35–104)
ALT SERPL-CCNC: 26 U/L (ref 5–33)
AST SERPL-CCNC: 27 U/L
BILIRUB SERPL-MCNC: 0.43 MG/DL (ref 0.3–1.2)
BILIRUBIN DIRECT: 0.13 MG/DL
BILIRUBIN, INDIRECT: 0.3 MG/DL (ref 0–1)
TOTAL PROTEIN: 7 G/DL (ref 6.4–8.3)

## 2022-03-15 PROCEDURE — 99214 OFFICE O/P EST MOD 30 MIN: CPT | Performed by: NURSE PRACTITIONER

## 2022-03-15 RX ORDER — AMLODIPINE BESYLATE 5 MG/1
5 TABLET ORAL DAILY
Qty: 90 TABLET | Refills: 1 | Status: SHIPPED | OUTPATIENT
Start: 2022-03-15 | End: 2022-10-31 | Stop reason: SDUPTHER

## 2022-03-15 ASSESSMENT — PATIENT HEALTH QUESTIONNAIRE - PHQ9
2. FEELING DOWN, DEPRESSED OR HOPELESS: 0
SUM OF ALL RESPONSES TO PHQ QUESTIONS 1-9: 0
SUM OF ALL RESPONSES TO PHQ9 QUESTIONS 1 & 2: 0
SUM OF ALL RESPONSES TO PHQ QUESTIONS 1-9: 0
1. LITTLE INTEREST OR PLEASURE IN DOING THINGS: 0
SUM OF ALL RESPONSES TO PHQ QUESTIONS 1-9: 0
SUM OF ALL RESPONSES TO PHQ QUESTIONS 1-9: 0

## 2022-03-15 ASSESSMENT — ENCOUNTER SYMPTOMS
SHORTNESS OF BREATH: 0
SORE THROAT: 0
ABDOMINAL PAIN: 0
TROUBLE SWALLOWING: 0
EYE DISCHARGE: 0
CHEST TIGHTNESS: 0
COUGH: 0
NAUSEA: 0
WHEEZING: 0
VOMITING: 0
SINUS PAIN: 0
DIARRHEA: 0
EYE ITCHING: 0
EYE REDNESS: 0
SINUS PRESSURE: 0

## 2022-03-15 NOTE — PROGRESS NOTES
[facility] PRIMARY CARE  [facility] [address]  [address] [address]  Dept: [phone]  Dept Fax: [phone]    Emmett Mcqueen is a 63 y.o. female who presents today for her medical conditions/complaintsas noted below. Emmett Mcqueen is c/o of 3 Month Follow-Up and Health Maintenance (discuss colonoscopy)        HPI:     Patient presents for 3-month follow-up  Blood pressure stable  Weight is stable    Patient presents for 3-month follow-up. She did follow-up with Dr. Juan Daniel Dudley for her knee pain. He suggested injections. She then decided that she wanted to go back to the [facility] [place] [facility] [place]. They also recommended injections. This had to go through her insurance which they did not approve. Her knee pain has resolved. She only has mild discomfort at times. She also feels that her overall inflammation has gone down. She is feeling less bloated. She is also concerned due to her inability to lose weight.             Past Medical History:   Diagnosis Date    Cancer [facility])     Breast:  left mastectomy, chemo radiation    Lymphedema     left arm    RA (rheumatoid arthritis) ([address]       Past Surgical History:   Procedure Laterality Date    MASTECTOMY         Family History   Problem Relation Age of Onset    High Blood Pressure Mother     Cancer Father         liver, lung and prostate    Other Sister         MS    Colon Cancer Maternal Grandmother        Social History     Tobacco Use    Smoking status: Never Smoker    Smokeless tobacco: Never Used   Substance Use Topics    Alcohol use: Not on file      Current Outpatient Medications   Medication Sig Dispense Refill    amLODIPine (NORVASC) 5 MG tablet Take 1 tablet by mouth daily 90 tablet 1    Naproxen Sodium (ALEVE PO) Take 220 mg by mouth 2 times daily      hydroxychloroquine (PLAQUENIL) 200 MG tablet Take 200 mg by mouth 2 times daily       No current facility-administered medications for this visit. No Known Allergies    Health Maintenance   Topic Date Due    Hepatitis C screen  Never done    HIV screen  Never done    Colorectal Cancer Screen  Never done    Shingles Vaccine (1 of 2) Never done    Flu vaccine (1) 12/14/2022 (Originally 9/1/2021)    DTaP/Tdap/Td vaccine (1 - Tdap) 03/15/2023 (Originally 11/15/1979)    COVID-19 Vaccine (2 - Dorann Ano 3-dose series) 03/15/2023 (Originally 10/28/2021)    Depression Screen  12/14/2022    Breast cancer screen  02/11/2023    Lipid screen  11/30/2026    Hepatitis A vaccine  Aged Out    Hepatitis B vaccine  Aged Out    Hib vaccine  Aged Out    Meningococcal (ACWY) vaccine  Aged Out    Pneumococcal 0-64 years Vaccine  Aged Out       :     Review of Systems   Constitutional: Negative for chills, fatigue and fever. HENT: Negative for ear discharge, ear pain, sinus pressure, sinus pain, sore throat and trouble swallowing. Eyes: Negative for discharge, redness and itching. Respiratory: Negative for cough, chest tightness, shortness of breath and wheezing. Cardiovascular: Negative for chest pain. Gastrointestinal: Negative for abdominal pain, diarrhea, nausea and vomiting. Genitourinary: Negative for difficulty urinating. Musculoskeletal: Negative for arthralgias and neck pain. Skin: Negative for rash. Neurological: Negative for dizziness, weakness, light-headedness and headaches. All other systems reviewed and are negative. Objective:     Physical Exam  Constitutional:       Appearance: Normal appearance. She is obese. HENT:      Head: Normocephalic and atraumatic. Nose: Nose normal.   Eyes:      Extraocular Movements: Extraocular movements intact. Conjunctiva/sclera: Conjunctivae normal.      Pupils: Pupils are equal, round, and reactive to light. Cardiovascular:      Rate and Rhythm: Normal rate and regular rhythm. Pulses: Normal pulses.       Heart sounds: Normal heart sounds. Pulmonary:      Effort: Pulmonary effort is normal.      Breath sounds: Normal breath sounds. Abdominal:      General: Abdomen is flat. Palpations: Abdomen is soft. Musculoskeletal:         General: Normal range of motion. Cervical back: Neck supple. Comments: RA noted to both hands   Skin:     General: Skin is warm and dry. Capillary Refill: Capillary refill takes less than 2 seconds. Neurological:      General: No focal deficit present. Mental Status: She is alert and oriented to person, place, and time. Psychiatric:         Mood and Affect: Mood normal.       /88   Pulse 100   Resp 16   Wt 204 lb 6.4 oz (92.7 kg)   SpO2 96%   Breastfeeding No   BMI 34.01 kg/m²     Assessment:       Diagnosis Orders   1. Primary hypertension     2. Elevated alkaline phosphatase level  Hepatic Function Panel   3. Screening for colon cancer  Felicity Velasquez MD, Gastroenterology, [address]   4. Rheumatoid arthritis involving both hands, unspecified whether rheumatoid factor present ([facility] [address]     5. Arthralgia, unspecified joint     6. Class 1 obesity due to excess calories with serious comorbidity and body mass index (BMI) of 34.0 to 34.9 in adult         :      Return in about 3 months (around 6/15/2022) for general follow up. 1.  Hypertension  -Blood pressure stable  Rx for amlodipine 5 mg daily refill sent in  2. Elevated alkaline phosphatase  -This is likely elevated due to her rheumatoid arthritis. We will repeat her levels since her inflammation has gone down  -Rx for repeat lab work  3. Screening for colon cancer  -Referral placed to GI. Her last colonoscopy was 10 years ago and negative. She does have a family history of colon cancer with her maternal grandfather. She declines a Cologuard. 4.  Rheumatoid arthritis  -Stable. Patient is taking Plaquenil 200 mg twice a day. Following with rheumatology  5.   Arthralgias  -Patient to continue to follow with orthopedics if her knee pain continues. I did tell her that she would need injections and possibly knee replacement in the future  6. Obesity  -Patient may be a candidate for wegovy or saxenda. Pt would like to look into it. Patient to follow-up in 3 months or sooner as needed  Orders Placed This Encounter   Procedures    Hepatic Function Panel     Standing Status:   Future     Number of Occurrences:   1     Standing Expiration Date:   3/15/2023   Whit Nash MD, Gastroenterology, [address]     Referral Priority:   Routine     Referral Type:   Eval and Treat     Referral Reason:   Specialty Services Required     Referred to Provider:   Cruz Chu MD     Requested Specialty:   Gastroenterology     Number of Visits Requested:   1     Orders Placed This Encounter   Medications    amLODIPine (NORVASC) 5 MG tablet     Sig: Take 1 tablet by mouth daily     Dispense:  90 tablet     Refill:  1       Patient given educational materials - seepatient instructions. Discussed use, benefit, and side effects of prescribed medications. All patient questions answered. Pt voiced understanding. Reviewed health maintenance. Instructed to continue current medications, diet and exercise. Patient agreedwith treatment plan. Follow up as directed.       Electronically signed by DOV Cortez CNP on 3/15/2022at 1:32 PM

## 2022-03-17 ENCOUNTER — HOSPITAL ENCOUNTER (OUTPATIENT)
Dept: OCCUPATIONAL THERAPY | Age: 62
Setting detail: THERAPIES SERIES
Discharge: HOME | End: 2022-03-17
Payer: COMMERCIAL

## 2022-03-17 PROCEDURE — 97535 SELF CARE MNGMENT TRAINING: CPT

## 2022-03-17 NOTE — DISCHARGE SUMMARY
TREATMENT LOCATION:   [] [address]   [facility] [address]: [phone]  F: [phone] [x] [address]: [phone]  F: [phone]        Lymphedema Services - Discharge Note of the Upper Extremity    Date:  3/17/2022  Patient: Gabe Gaspar  : 1960             MRN: [id number]  Referring Physician: Yovanny Ward MD       Phone: [phone]  Fax: [phone]  Insurance:  [facility] - 60 visits per calendar year she has 54 left combined with SLP/PT no copay, no auth required. Medical Diagnosis: Postmastectomy lymphedema syndrome  Rehab Codes: I89.0  Onset Date: 2021   Visit# / total visits: 15/15    Discharge Status:   [x] Treatment goals were met. [x] Pt received maximum benefit. No further therapy indicated at this time. [x] Pt to continue exercise/home instructions independently. Absolute Lymphedema Contraindications - treatement [x]? NONE    Absolute Contraindications Regarding the Deep Abdomen: [x]? NONE   Relative Lymphedema Contraindications [x]? NONE      Subjective:. \"I am really pleased with the size of my arm and how much it has improved. I feel that I am good to be discharged. \"       Pain: [] YES    [x] NO     Location: 0       Pain Rating: ( 0-10 scale) : 0/10  Comments:       Objective:   [x] Measurement [] Bandaging [] MLD [] Skin care   [x] Education [] Self-bandaging [] Self-MLD [] Wound Care   [] Exercise [] Caregiver training [] Kinesiotaping [] Other:    [] Nutrition [x] Garment fitting/training [] Vasopneumatic Pump        Circumferential Measurements   Measurements taken from nail base of D3 digit. Fingers are measured at the base.    Measurements (cm) Right Left   D1        D2         D3        D4        D5          Dorsum   11 20.0 19.5   Wrist   16 16.4 17.5   Lower Forearm  25 19.3 23.5   Upper Forearm  34 25.5 27.5   Olecranon   40 25.4 26.4   Lower Bicep  48  29.0 29.5   Upper Bicep  57 32.0 32.5   Current: 3/17/22    1/18/21    9/21/21     8/5/21    8/3/21     7/29/21    7/22/21    7/20/21    7/15/21    7/13/21    7/8/21    7/6/21     7/1/2021     Initial Total 6/15/2021:   X    X    X    X    X    X    X    X    X    X    X    X    X    167.6 176.4    178.2    180.5    184.7    189.3    187.7    184.0    187. 2    191.8    186.7    193.0    193.2    190.5    205.4       Assessment :  [x] Progressing toward goals. Pt arrives to today's treatment with arm sleeve in place on the L UE, with the Velcro device over top of it and the gauntlet in place for her hand. She states that she feels her arm is doing even better than it previously was overall and that she feels it is appropriate to discharge. New measurements are taken and noted above- noting another reduction. Pt started PT services on Monday 9/27/21 - ONGOING  States she is doing:  MLD- DAILY  PUMP - USUALLY DAILY  COMPRESSION - SLEEVE DURING DAYTIME, VELCRO AT NIGHT -     She was able to get her compression glove altered for a better fit, custom made arm sleeve also of good fit. Pt completes all discharge planning and states that she has no other questions at this time. Post-treatment symptom assessment for swelling, heaviness, tightness to the affected limb, chest or truncal area:                            [] No change   [x] Improving   [x] Patient would continue to benefit from skilled occupational therapy services in order to address the following goals                STG - To be addressed within 3 visits     Pt will demonstrate compliance of maintaining lymphedema precautions to reduce the risks of infection and further exacerbations. - GOAL MET 7/22/21     Pt will demonstrate independence with decongestive exercise program in order to expedite fluid rerouting.- GOAL MET 7/22/21        LTG To be adressed within 8 visits      Pt will demonstrate competence with SELF-MLD ( manual lymphatic drainage) in order to reroute lymphatic pathways for decreased swelling. - GOAL MET 7/22/21     Pt/Caregiver will demonstrate independence with donning/doffing and wearing schedule for compression garments/ devices to maintain decreased size upon discharge. - ONGOING - 7/22/21 - GOAL MET 3/17/2022     Pt will demonstrate compliance with skin care routine for improved overall skin integrity and decreased risk of wounds and infection. GOAL MET 7/22/21     Pt to compliant with CDT in order to reduce edema in the L UE by 3+ cm. - ONGOING- 7/22/21, GOAL MET - 3/17/2022     Pt will demonstrate independence with after breast surgery stretches (corner stretch, butterfly stretch, side arm stretch) in order to increase ease with AROM. ONGOING 7/22/21- GOAL MET -3/17/2022     Patient's Goal: \" I would like to get my arm down in size, find something that works and hopefully get this surgery. \"     Response to Education Provided:  [x] Verbalized understanding   [x] Demonstrates/verbalizes understanding of home program/edu previously given     [x] Needs continued review            [] No understanding   Learner(s): [x] Patient  [] Spouse [] Family [] Other:   Method(s): [x] Verbal [x] Demo [x] Handouts     Knowledge of home program:  [x] Good [x] Fair [] Poor [] With assist from family/caregiver    FREQUENCY: Pt will be seen 2 x/ week for an addtional 9 visits and will follow up as appropriate. Focus is to remain on short and long term goals as listed above.      Treatment Charges   Minutes   Units   Evaluation (24983)                                                $95.15 / $75.40            Low            Moderate            High     Manual Therapy (28798):                                      $26.92 / $21.34     Therapeutic activities (99496):                             $37.46/ $26.79     Therapeutic Exercise (95865)                              $29.21/$ 22.84     Self care/home mgmt (05323)                              $32.39 / $24.43 60 4   Other: Vasopneumatic Pump     Total Treatment Time    60 4       Time In: 11:00 Time Out: 12:00      Electronically signed by Erika Evans OT on 3/17/2022 at 10:59 AM

## 2022-03-29 ENCOUNTER — HOSPITAL ENCOUNTER (OUTPATIENT)
Dept: PHYSICAL THERAPY | Facility: CLINIC | Age: 62
Setting detail: THERAPIES SERIES
Discharge: HOME | End: 2022-03-29
Payer: COMMERCIAL

## 2022-03-29 PROCEDURE — 97110 THERAPEUTIC EXERCISES: CPT

## 2022-03-29 PROCEDURE — 97140 MANUAL THERAPY 1/> REGIONS: CPT

## 2022-03-29 NOTE — FLOWSHEET NOTE
[] [facility]  [address]  [phone]  F: [phone] [] [address]  [facility]   [address]  P: [phone]  F: [phone] [x] [facility] &  Therapy  [facility]  P: [phone]  F: [phone] [] [address]  P: [phone]  F: [phone] [] [address]  [facility]   [address]   [place]: [phone]  F: [phone]      Physical Therapy Daily Treatment Note    Date:  3/29/2022  Patient Name:  Nancy Alvarado    :  1960  MRN: [id number]  Physician: Szui Chapa MD                             Insurance: [employer] 60 visits per calendar year combined with SLP/PT no copay, no auth required.   Medical Diagnosis: Postmastectomy Lymphedema of left arm       Rehab Codes: I89.0, I97.2  Onset Date: 21   Next Dr. Jewels Emmanuel: ?  Visit# / total visits:      Cancels/No Shows: 0    Subjective:  Pt reports she was OOT and ate a lot a lot of salty foods and not enough water, had some incr swelling. Saw lymphedema and notes measurements were down. Wearing new sleeve/garments and jaydon well, discharged from OT at this time. Notes she needs to get back into routine from vacation. Pain:  [] Yes  [x] No Location: L UE  Pain Rating: (0-10 scale) 0/10  Pain altered Tx:  [x] No  [] Yes  Action:  Comments:      Objective:  Modalities:   Precautions: L BrCa, L mastectomy 23 LN removed , Chemo 1-2012 then radiation. Tamoxifen 5 yrs, anestrozol for 4 yrs-jt pain, no longer taking  Manual: Utilized PORi breast protocol to LEFT upper quarter for gentle manual lymphatic drainage, myofascial release and joint mobility to facilitate better function ROM and dynamics of lymph system.   Exercises:  Exercise Reps/ Time Weight/ Level Comments             Diaphragmatic breathing 5 cycles       Elbow winging supine 1m  HEP     Wand flexion  10x  HEP     Bye, bye ex 3x   Flex, abd          post shld rolls, shrugs  10x       Scapular retraction  10x 5 sec      wall slide  10x HEP flex    pec stretch at door  3x30   varied arm position          SL scap Abd, ER, HAB 10x     OH flex at wall 10x     Wall angels 10x     UT stretch 3x15  **         UE tband: ext, row, ER, tricep, bicep 10-15x ea Lime NT   Free weight: bicep, 3 way fly, bicep, tricep 10x ea 2#          Core stab:   *    Standing  *                  Other: L shoulder AROM:  160° pulling in axilla before RX,165°  ULTT:L -     Treatment Charges: Mins Units   []  Modalities     []  Ther Exercise 10 1   [x]  Manual Therapy 45 3   []  Ther Activities     []  Aquatics     []  Vasocompression     []  Other     Total Treatment time 55        Assessment: [x] Progressing toward goals. Cont'd w/ PORI breast protocol to L upper quarter. Pt maintaining ROM well and improving scar and chest wall tissue mobility. No sig fullness noted, mod decr in upper arm. Rev HEP, tactile and vc's for decr UT compensation, advised to complete ex in mirror for visual feedback. Added UT stretch this date. Emphasized pertinent ex to complete daily as well as cont'd postural awareness and monitoring lymphedema. Will cont bi-weekly for manual and ex progression as jaydon. Will re assess soon. [] No change. [] Other:  [x] Patient would continue to benefit from skilled physical therapy services in order to: address the following goals    STG: (to be met in 12 treatments)  1. ? Pain: Stabilize shoulder pain and ensure optimal management of pain during all ADL's (home, occupation, community and recreation)- MET  2. Optimize AROM of bilateral shoulders for functional activity to improve QOL.- MET, was 121° at eval now 165°  3.  Increase strength in bilateral shoulders and scapula to grossly 5/5 and L

## 2022-04-06 ENCOUNTER — TELEPHONE (OUTPATIENT)
Dept: GASTROENTEROLOGY | Age: 62
End: 2022-04-06

## 2022-04-06 RX ORDER — BISACODYL 5 MG
TABLET, DELAYED RELEASE (ENTERIC COATED) ORAL
Qty: 4 TABLET | Refills: 0 | Status: SHIPPED | OUTPATIENT
Start: 2022-04-06

## 2022-04-06 RX ORDER — POLYETHYLENE GLYCOL 3350 17 G/17G
POWDER, FOR SOLUTION ORAL
Qty: 289 G | Refills: 0 | Status: SHIPPED | OUTPATIENT
Start: 2022-04-06

## 2022-04-06 NOTE — TELEPHONE ENCOUNTER
Procedure Scheduled/Leisa Rapp  Colonoscopy/Screening  4/26/22   9:30  P[facility]    Miralax/Dul instructions mailed    COVID testing   4/22/22   9:00 am   1 [facility]    Patient notified by phone/mail    Colonoscopy questionnaire complete.

## 2022-04-12 ENCOUNTER — HOSPITAL ENCOUNTER (OUTPATIENT)
Dept: PHYSICAL THERAPY | Facility: CLINIC | Age: 62
Setting detail: THERAPIES SERIES
Discharge: HOME | End: 2022-04-12
Payer: COMMERCIAL

## 2022-04-12 PROCEDURE — 97110 THERAPEUTIC EXERCISES: CPT

## 2022-04-12 PROCEDURE — 97140 MANUAL THERAPY 1/> REGIONS: CPT

## 2022-04-12 NOTE — FLOWSHEET NOTE
[] [address]  [address]  [phone]  F: [phone] [] [address]  [facility] [address]   [address]  P: [phone]  F: [phone] [x] [address] &  Therapy  [facility]  P: [phone]  F: [phone] [] [address]  P: [phone]  F: [phone] [] [address]  [facility]   [address]   [place]: [phone]  F: [phone]      Physical Therapy Daily Treatment Note    Date:  2022  Patient Name:  Talia May    :  1960  MRN: [id number]  Physician: Brannon Madrigal MD                             Insurance: [employer] 60 visits per calendar year combined with SLP/PT no copay, no auth required.   Medical Diagnosis: Postmastectomy Lymphedema of left arm       Rehab Codes: I89.0, I97.2  Onset Date: 21   Next Dr. Carrie Faria: ?  Visit# / total visits:      Cancels/No Shows: 0    Subjective:  Pt reports she still feels a little full still from being OOT. Wearing new sleeve/garments and jaydon well, notes hand swells some w/ new sleeve, uses gauntlet regularly. States she wakes w/ her hand swollen and feels her night splint/wrap is not fitting well-advised to f/u w/ Lymphedema/supplier to get better fitting wrap. HEP is going well. Pain:  [] Yes  [x] No Location: L UE  Pain Rating: (0-10 scale) 0/10  Pain altered Tx:  [x] No  [] Yes  Action:  Comments:      Objective:  Modalities:   Precautions: L BrCa, L mastectomy 23 LN removed , Chemo 1-2012 then radiation.  Tamoxifen 5 yrs, anestrozol for 4 yrs-jt pain, no longer taking  Manual: Utilized PORi breast protocol to LEFT upper quarter for gentle manual lymphatic drainage, myofascial release and joint mobility to facilitate better function ROM and dynamics of lymph system. Exercises:  Exercise Reps/ Time Weight/ Level Comments             Diaphragmatic breathing 5 cycles       Elbow winging supine 1m  HEP     Wand flexion  10x  HEP     Bye, bye ex 3x   Flex, abd          post shld rolls, shrugs  10x       Scapular retraction  10x 5 sec      wall slide  10x HEP flex    pec stretch at door  3x30   varied arm position          SL scap Abd, ER, HAB 10x     OH flex at wall 10x     Wall angels 10x     UT stretch 3x15           UE tband: ext, row, ER, tricep, bicep 10-15x ea Lime NT   Free weight: bicep, 3 way fly, bicep, tricep 10x ea 2# HEP         Piriformis stretch 3x30     Core stab: Tra contr, march 10x ea     bridges 10x     Standing  *                  Other: L shoulder AROM:  160° pulling in axilla before RX,170°  ULTT:L -     Treatment Charges: Mins Units   []  Modalities     []  Ther Exercise 10 1   [x]  Manual Therapy 45 3   []  Ther Activities     []  Aquatics     []  Vasocompression     []  Other     Total Treatment time 55        Assessment: [x] Progressing toward goals. Cont'd w/ PORI breast protocol to L upper quarter. Pt maintaining ROM well and improving scar and chest wall tissue mobility. No sig fullness noted upper arm, mod fullness in forearm and hand. Rev HEP and added core stab ex, bridges and piriformis stretch. Issued HO for HEP. Emphasized pertinent ex to complete daily as well as cont'd postural awareness and monitoring lymphedema. Will cont bi-weekly for manual and ex progression as jaydon. Will re assess next visit and update POC. [] No change. [] Other:  [x] Patient would continue to benefit from skilled physical therapy services in order to: address the following goals    STG: (to be met in 12 treatments)  1. ? Pain: Stabilize shoulder pain and ensure optimal management of pain during all ADL's (home, occupation, community and recreation)- MET  2.  Optimize AROM of bilateral shoulders for functional activity to improve QOL.- MET, was 121° at eval now 165°  3. Increase strength in bilateral shoulders and scapula to grossly 5/5 and L  by 3+ lbs for good postural stability and functional tasks  4. Independent with Home Exercise Programs  5. Education on signs and symptoms, precautions regarding lymphedema- MET         LTG: (to be met in 18 treatments)  1. Pt to report improved sleep  2. Decr BFI score by 1 for improved jaydon to activity and overall increased function  3. Continue activity to decrease risk factors associated with increased time being sedentary while undergoing chemotherapy, radiation, surgery. 4.   Improve tissue mobility of chest wall and axilla to allow for more normal motion, function and lymphatic mobility and drainage  5. Control/mitigate side effects/late effects of Cancer treatment      Pt. Education:  [x] Yes  [] No  [x] Reviewed Prior HEP/Ed  Method of Education: [x] Verbal  [x] Demo  [x] Written  Comprehension of Education:  [x] Verbalizes understanding. [] Demonstrates understanding. [] Needs review. [] Demonstrates/verbalizes HEP/Ed previously given. Plan: [x] Continue current frequency toward long and short term goals.     [] Specific Instructions for subsequent treatments:        Time In: 1000 am           Time Out: 1100 pm    Electronically signed by:  Constanza Goldberg PT , NEELAM

## 2022-04-22 NOTE — TELEPHONE ENCOUNTER
Patient called asking how much Tylenol she can take as she has a bad knee. Advised that she should not take anymore than recommended daily dose. Advised patient not to take any Ibuprofen products. She verbalized understanding.

## 2022-04-25 ENCOUNTER — ANESTHESIA EVENT (OUTPATIENT)
Dept: OPERATING ROOM | Age: 62
End: 2022-04-25
Payer: COMMERCIAL

## 2022-04-26 ENCOUNTER — HOSPITAL ENCOUNTER (OUTPATIENT)
Age: 62
Setting detail: OUTPATIENT SURGERY
Discharge: HOME | End: 2022-04-26
Attending: INTERNAL MEDICINE | Admitting: INTERNAL MEDICINE
Payer: COMMERCIAL

## 2022-04-26 ENCOUNTER — ANESTHESIA (OUTPATIENT)
Dept: OPERATING ROOM | Age: 62
End: 2022-04-26
Payer: COMMERCIAL

## 2022-04-26 VITALS
HEIGHT: 65 IN | TEMPERATURE: 97.1 F | RESPIRATION RATE: 18 BRPM | WEIGHT: 206.5 LBS | OXYGEN SATURATION: 99 % | BODY MASS INDEX: 34.41 KG/M2 | SYSTOLIC BLOOD PRESSURE: 112 MMHG | HEART RATE: 83 BPM | DIASTOLIC BLOOD PRESSURE: 83 MMHG

## 2022-04-26 VITALS
DIASTOLIC BLOOD PRESSURE: 61 MMHG | RESPIRATION RATE: 26 BRPM | SYSTOLIC BLOOD PRESSURE: 98 MMHG | OXYGEN SATURATION: 96 %

## 2022-04-26 PROCEDURE — 2709999900 HC NON-CHARGEABLE SUPPLY: Performed by: INTERNAL MEDICINE

## 2022-04-26 PROCEDURE — 45378 DIAGNOSTIC COLONOSCOPY: CPT | Performed by: INTERNAL MEDICINE

## 2022-04-26 PROCEDURE — 2500000003 HC RX 250 WO HCPCS: Performed by: NURSE ANESTHETIST, CERTIFIED REGISTERED

## 2022-04-26 PROCEDURE — 3700000000 HC ANESTHESIA ATTENDED CARE: Performed by: INTERNAL MEDICINE

## 2022-04-26 PROCEDURE — 3609027000 HC COLONOSCOPY: Performed by: INTERNAL MEDICINE

## 2022-04-26 PROCEDURE — 6360000002 HC RX W HCPCS: Performed by: NURSE ANESTHETIST, CERTIFIED REGISTERED

## 2022-04-26 PROCEDURE — 3700000001 HC ADD 15 MINUTES (ANESTHESIA): Performed by: INTERNAL MEDICINE

## 2022-04-26 PROCEDURE — 2580000003 HC RX 258: Performed by: ANESTHESIOLOGY

## 2022-04-26 PROCEDURE — 7100000010 HC PHASE II RECOVERY - FIRST 15 MIN: Performed by: INTERNAL MEDICINE

## 2022-04-26 PROCEDURE — 2580000003 HC RX 258: Performed by: NURSE ANESTHETIST, CERTIFIED REGISTERED

## 2022-04-26 PROCEDURE — 7100000011 HC PHASE II RECOVERY - ADDTL 15 MIN: Performed by: INTERNAL MEDICINE

## 2022-04-26 RX ORDER — MORPHINE SULFATE 1 MG/ML
1 INJECTION, SOLUTION EPIDURAL; INTRATHECAL; INTRAVENOUS EVERY 5 MIN PRN
Status: DISCONTINUED | OUTPATIENT
Start: 2022-04-26 | End: 2022-04-26 | Stop reason: HOSPADM

## 2022-04-26 RX ORDER — SODIUM CHLORIDE 9 MG/ML
25 INJECTION, SOLUTION INTRAVENOUS PRN
Status: DISCONTINUED | OUTPATIENT
Start: 2022-04-26 | End: 2022-04-26 | Stop reason: HOSPADM

## 2022-04-26 RX ORDER — MIDAZOLAM HYDROCHLORIDE 1 MG/ML
INJECTION INTRAMUSCULAR; INTRAVENOUS PRN
Status: DISCONTINUED | OUTPATIENT
Start: 2022-04-26 | End: 2022-04-26 | Stop reason: SDUPTHER

## 2022-04-26 RX ORDER — SODIUM CHLORIDE 0.9 % (FLUSH) 0.9 %
5-40 SYRINGE (ML) INJECTION EVERY 12 HOURS SCHEDULED
Status: DISCONTINUED | OUTPATIENT
Start: 2022-04-26 | End: 2022-04-26 | Stop reason: HOSPADM

## 2022-04-26 RX ORDER — PROPOFOL 10 MG/ML
INJECTION, EMULSION INTRAVENOUS PRN
Status: DISCONTINUED | OUTPATIENT
Start: 2022-04-26 | End: 2022-04-26 | Stop reason: SDUPTHER

## 2022-04-26 RX ORDER — SODIUM CHLORIDE 0.9 % (FLUSH) 0.9 %
5-40 SYRINGE (ML) INJECTION PRN
Status: DISCONTINUED | OUTPATIENT
Start: 2022-04-26 | End: 2022-04-26 | Stop reason: HOSPADM

## 2022-04-26 RX ORDER — LIDOCAINE HYDROCHLORIDE 10 MG/ML
1 INJECTION, SOLUTION EPIDURAL; INFILTRATION; INTRACAUDAL; PERINEURAL
Status: DISCONTINUED | OUTPATIENT
Start: 2022-04-26 | End: 2022-04-26 | Stop reason: HOSPADM

## 2022-04-26 RX ORDER — SODIUM CHLORIDE, SODIUM LACTATE, POTASSIUM CHLORIDE, CALCIUM CHLORIDE 600; 310; 30; 20 MG/100ML; MG/100ML; MG/100ML; MG/100ML
INJECTION, SOLUTION INTRAVENOUS CONTINUOUS
Status: DISCONTINUED | OUTPATIENT
Start: 2022-04-26 | End: 2022-04-26 | Stop reason: HOSPADM

## 2022-04-26 RX ORDER — SODIUM CHLORIDE 9 MG/ML
INJECTION, SOLUTION INTRAVENOUS PRN
Status: DISCONTINUED | OUTPATIENT
Start: 2022-04-26 | End: 2022-04-26 | Stop reason: HOSPADM

## 2022-04-26 RX ORDER — ONDANSETRON 2 MG/ML
4 INJECTION INTRAMUSCULAR; INTRAVENOUS
Status: DISCONTINUED | OUTPATIENT
Start: 2022-04-26 | End: 2022-04-26 | Stop reason: HOSPADM

## 2022-04-26 RX ORDER — LIDOCAINE HYDROCHLORIDE 10 MG/ML
INJECTION, SOLUTION EPIDURAL; INFILTRATION; INTRACAUDAL; PERINEURAL PRN
Status: DISCONTINUED | OUTPATIENT
Start: 2022-04-26 | End: 2022-04-26 | Stop reason: SDUPTHER

## 2022-04-26 RX ORDER — DIPHENHYDRAMINE HYDROCHLORIDE 50 MG/ML
12.5 INJECTION INTRAMUSCULAR; INTRAVENOUS
Status: DISCONTINUED | OUTPATIENT
Start: 2022-04-26 | End: 2022-04-26 | Stop reason: HOSPADM

## 2022-04-26 RX ORDER — SODIUM CHLORIDE, SODIUM LACTATE, POTASSIUM CHLORIDE, CALCIUM CHLORIDE 600; 310; 30; 20 MG/100ML; MG/100ML; MG/100ML; MG/100ML
INJECTION, SOLUTION INTRAVENOUS CONTINUOUS PRN
Status: DISCONTINUED | OUTPATIENT
Start: 2022-04-26 | End: 2022-04-26 | Stop reason: SDUPTHER

## 2022-04-26 RX ORDER — MEPERIDINE HYDROCHLORIDE 50 MG/ML
12.5 INJECTION INTRAMUSCULAR; INTRAVENOUS; SUBCUTANEOUS ONCE
Status: DISCONTINUED | OUTPATIENT
Start: 2022-04-26 | End: 2022-04-26 | Stop reason: HOSPADM

## 2022-04-26 RX ADMIN — PROPOFOL 30 MG: 10 INJECTION, EMULSION INTRAVENOUS at 09:14

## 2022-04-26 RX ADMIN — PROPOFOL 40 MG: 10 INJECTION, EMULSION INTRAVENOUS at 08:59

## 2022-04-26 RX ADMIN — PROPOFOL 30 MG: 10 INJECTION, EMULSION INTRAVENOUS at 09:01

## 2022-04-26 RX ADMIN — SODIUM CHLORIDE, POTASSIUM CHLORIDE, SODIUM LACTATE AND CALCIUM CHLORIDE: 600; 310; 30; 20 INJECTION, SOLUTION INTRAVENOUS at 08:53

## 2022-04-26 RX ADMIN — SODIUM CHLORIDE, POTASSIUM CHLORIDE, SODIUM LACTATE AND CALCIUM CHLORIDE: 600; 310; 30; 20 INJECTION, SOLUTION INTRAVENOUS at 08:30

## 2022-04-26 RX ADMIN — PROPOFOL 30 MG: 10 INJECTION, EMULSION INTRAVENOUS at 09:08

## 2022-04-26 RX ADMIN — LIDOCAINE HYDROCHLORIDE 50 MG: 10 INJECTION, SOLUTION EPIDURAL; INFILTRATION; INTRACAUDAL; PERINEURAL at 08:57

## 2022-04-26 RX ADMIN — PROPOFOL 30 MG: 10 INJECTION, EMULSION INTRAVENOUS at 09:03

## 2022-04-26 RX ADMIN — PROPOFOL 30 MG: 10 INJECTION, EMULSION INTRAVENOUS at 09:06

## 2022-04-26 RX ADMIN — PROPOFOL 30 MG: 10 INJECTION, EMULSION INTRAVENOUS at 09:11

## 2022-04-26 RX ADMIN — PROPOFOL 30 MG: 10 INJECTION, EMULSION INTRAVENOUS at 09:17

## 2022-04-26 RX ADMIN — SODIUM CHLORIDE, POTASSIUM CHLORIDE, SODIUM LACTATE AND CALCIUM CHLORIDE: 600; 310; 30; 20 INJECTION, SOLUTION INTRAVENOUS at 08:46

## 2022-04-26 RX ADMIN — PROPOFOL 70 MG: 10 INJECTION, EMULSION INTRAVENOUS at 08:58

## 2022-04-26 RX ADMIN — MIDAZOLAM HYDROCHLORIDE 2 MG: 1 INJECTION, SOLUTION INTRAMUSCULAR; INTRAVENOUS at 08:54

## 2022-04-26 ASSESSMENT — PULMONARY FUNCTION TESTS
PIF_VALUE: 1

## 2022-04-26 ASSESSMENT — PAIN - FUNCTIONAL ASSESSMENT: PAIN_FUNCTIONAL_ASSESSMENT: 0-10

## 2022-04-26 NOTE — OP NOTE
Operative Note      Patient: Joelle Parrish  YOB: 1960  MRN: [id number]    Date of Procedure: 4/26/2022    Pre-Op Diagnosis: Z12.11  SCREENING    Post-Op Diagnosis: Moderate to large hemorrhoids, mild diverticulosis, ADEQUATE PREP       Procedure(s):  COLORECTAL CANCER SCREENING, NOT HIGH RISK    Surgeon(s):  Odette Bowles MD    Assistant:   * No surgical staff found *    Anesthesia: Monitor Anesthesia Care    Estimated Blood Loss (mL): Minimal    Complications: None    Specimens:   * No specimens in log *    Implants:  * No implants in log *      Drains: * No LDAs found *          GASTROENTEROLOGY     [place] [facility]     COLONOSCOPY    PROCEDURE DATE: 04/26/22    REFERRING PHYSICIAN: No ref. provider found     PRIMARY CARE PROVIDER: DOV Rider - [facility]    ATTENDING PHYSICIAN: Odette Bowles MD     HISTORY: Ms. Joelle Parrish is a 63 y.o. female who presents to the  endoscopy unit for colonoscopy. The patient's clinical history is remarkable for anxiety, lymphedema, HTN, referred screening colonoscopy. She is currently medically stable and appropriate for the planned procedure. PREOPERATIVE DIAGNOSIS: screening for colon cancer. PROCEDURES:   Transanal Colonoscopy --screening. POSTPROCEDURE DIAGNOSIS:    FAIR to ADEQUATE PREP     1) Moderate to large external hemorrhoids/moderate internal hemorrhoids  2) Mild left sided diverticulosis   3) No obvious large lesions/polyps identified    MEDICATIONS:     MAC per anesthesia     EBL <10cc      INSTRUMENT: Olympus CF-H180 AL Pediatric flexible Colonoscope. PREPARATION: The nature and character of the procedure as well as risks, benefits, and alternatives were discussed with the patient and informed consent was obtained.  Complications were said to include, but were not limited to: medication allergy, medication reaction, cardiovascular and respiratory problems, bleeding, perforation, infection, and/or missed diagnosis. Following arrival in the endoscopy room, the patient was placed in the left lateral decubitus position and final time-out accomplished in the presence of the nursing staff. Baseline vital signs were obtained and reviewed, and IV sedation was subsequently initiated. FINDINGS: Rectal examination demonstrated no significant visible external abnormality and digital palpation was unremarkable. Following adequate conscious sedation the colonoscope was introduced and advanced under direct visualization to the cecum, which was identified by the ileocecal valve and appendiceal orifice. The bowel preparation was felt to be FAIR to ADEQUATE. This included large amounts of green stool that was mostly able to be adequately irrigated and aspirated. Cecal intubation time was 8 minutes. Once maximally inserted, the endoscope was withdrawn and the mucosa was carefully inspected. The mucosal exam was revealed no large lesions. Retroflexion was performed in the rectum and moderate to large hemorrhoids. Withdrawal time was 16 minutes. IMPRESSION:     FAIR to ADEQUATE PREP     1) Moderate to large external hemorrhoids/moderate internal hemorrhoids  2) Mild left sided diverticulosis   3) No obvious large lesions/polyps identified    RECOMMENDATIONS:   1) Follow up with referring provider, as previously scheduled. 2) Repeat Colonoscopy in 10 yrs. Consider treatment of hemorrhoids if symptomatic. [facility]  Gastroenterology   04/26/22    This note is created with the assistance of a speech recognition program.  While intending to generate a document that actually reflects the content of the visit, the document can still have some errors including those of syntax and sound a like substitutions which may escape proof reading. It such instances, actual meaning can be extrapolated by contextual diversion.     The patient was counseled at length about the risks of tracy Covid-19 during their perioperative period and any recovery window from their procedure. The patient was made aware that tracy Covid-19  may worsen their prognosis for recovering from their procedure  and lend to a higher morbidity and/or mortality risk. All material risks, benefits, and reasonable alternatives including postponing the procedure were discussed. The patient DOES wish to proceed with the procedure at this time.       Electronically signed by Jackie Martinez MD on 4/26/2022 at 9:28 AM

## 2022-04-26 NOTE — ANESTHESIA PRE PROCEDURE
Department of Anesthesiology  Preprocedure Note       Name:  Dawson Ramirez   Age:  63 y.o.  :  1960                                          MRN:  [id number]         Date:  2022      Surgeon: Nuria Berry):  Ronit Castanon MD    Procedure: Procedure(s):  COLORECTAL CANCER SCREENING, NOT HIGH RISK    Medications prior to admission:   Prior to Admission medications    Medication Sig Start Date End Date Taking?  Authorizing Provider   polyethylene glycol [facility]) 17 GM/SCOOP powder Take as directed for bowel prep/colonoscopy 22   Ronit Castanon MD   bisacodyl (BISACODYL) 5 MG EC tablet Take as directed for bowel prep/colonoscopy 22   Ronit Castanon MD   amLODIPine (NORVASC) 5 MG tablet Take 1 tablet by mouth daily 3/15/22   DOV Enamorado CNP   Naproxen Sodium (ALEVE PO) Take 220 mg by mouth 2 times daily    Historical Provider, MD   hydroxychloroquine (PLAQUENIL) 200 MG tablet Take 200 mg by mouth 2 times daily    Historical Provider, MD       Current medications:    Current Facility-Administered Medications   Medication Dose Route Frequency Provider Last Rate Last Admin    lidocaine PF 1 % injection 1 mL  1 mL IntraDERmal Once PRN Anmol Pelaez MD        lactated ringers infusion   IntraVENous Continuous Anmol Pelaez MD        sodium chloride flush 0.9 % injection 5-40 mL  5-40 mL IntraVENous 2 times per day Anmol Pelaez MD        sodium chloride flush 0.9 % injection 5-40 mL  5-40 mL IntraVENous PRN Anmol Pelaez MD        0.9 % sodium chloride infusion   IntraVENous PRN Anmol Pelaez MD           Allergies:  No Known Allergies    Problem List:    Patient Active Problem List   Diagnosis Code    [facility] C50.919    Anxiety F41.9    Hypertension I10    Lymphadenopathy R59.1    Lymphedema of left arm I89.0    Malignant neoplasm of upper-outer quadrant of right female breast ([facility] C50.411    Adiposity E66.9    Vitamin D deficiency, unspecified E55.9    Rheumatoid arthritis involving both hands (HCC) M06.9    Chronic pain of both knees M25.561, M25.562, G89.29       Past Medical History:        Diagnosis Date    [facility] ([facility]     Breast:  left mastectomy, chemo radiation    Hypertension     Lymphedema     left arm    RA (rheumatoid arthritis) ([facility]     Trigger finger        Past Surgical History:        Procedure Laterality Date    BREAST SURGERY      MASTECTOMY         Social History:    Social History     Tobacco Use    Smoking status: Never Smoker    Smokeless tobacco: Never Used   Substance Use Topics    Alcohol use: Not on file                                Counseling given: Not Answered      Vital Signs (Current): There were no vitals filed for this visit. BP Readings from Last 3 Encounters:   03/15/22 124/88   12/14/21 118/84   11/30/21 106/88       NPO Status:                                                                                 BMI:   Wt Readings from Last 3 Encounters:   03/15/22 204 lb 6.4 oz (92.7 kg)   01/05/22 205 lb (93 kg)   12/14/21 204 lb 3.2 oz (92.6 kg)     There is no height or weight on file to calculate BMI.    CBC:   Lab Results   Component Value Date    WBC 8.9 11/30/2021    RBC 5.36 11/30/2021    HGB 15.0 11/30/2021    HCT 47.5 11/30/2021    MCV 88.6 11/30/2021    RDW 12.3 11/30/2021     11/30/2021       CMP:   Lab Results   Component Value Date     01/21/2022    K 3.9 01/21/2022     01/21/2022    CO2 22 01/21/2022    BUN 8 01/21/2022    CREATININE 0.77 01/21/2022    GFRAA >60 01/21/2022    LABGLOM >60 01/21/2022    GLUCOSE 89 01/21/2022    PROT 7.0 03/15/2022    CALCIUM 9.6 01/21/2022    BILITOT 0.43 03/15/2022    ALKPHOS 119 03/15/2022    AST 27 03/15/2022    ALT 26 03/15/2022       POC Tests: No results for input(s): POCGLU, POCNA, POCK, POCCL, POCBUN, POCHEMO, POCHCT in the last 72 hours.     Coags:   Lab Results Component Value Date    APTT 31.2 11/12/2020       HCG (If Applicable): No results found for: PREGTESTUR, PREGSERUM, HCG, HCGQUANT     ABGs: No results found for: PHART, PO2ART, ZBO8HVY, NWR0RDR, BEART, Y2CAFSLY     Type & Screen (If Applicable):  No results found for: LABABO, LABRH    Drug/Infectious Status (If Applicable):  No results found for: HIV, HEPCAB    COVID-19 Screening (If Applicable): No results found for: COVID19        Anesthesia Evaluation  Patient summary reviewed and Nursing notes reviewed no history of anesthetic complications:   Airway: Mallampati: II  TM distance: >3 FB   Neck ROM: full  Mouth opening: > = 3 FB Dental: normal exam         Pulmonary:Negative Pulmonary ROS and normal exam  breath sounds clear to auscultation                             Cardiovascular:    (+) hypertension: no interval change,         Rhythm: regular  Rate: normal                    Neuro/Psych:   (+) depression/anxiety             GI/Hepatic/Renal:   (+) bowel prep,           Endo/Other:    (+) : arthritis: rheumatoid. , malignancy/cancer. ROS comment: Malignant neoplasm of breast Abdominal:       Abdomen: soft. Vascular: negative vascular ROS. Other Findings:             Anesthesia Plan      MAC and general     ASA 2             Anesthetic plan and risks discussed with patient. Plan discussed with CRNA.                   Maximino Manning MD   4/26/2022

## 2022-04-26 NOTE — H&P
Procedure History and Physical    Pre-Procedural Diagnosis:  Screening colonoscopy    Indications:  same    Procedure Planned: colonoscopy     History Obtained From:  patient    HISTORY OF PRESENT ILLNESS:       The patient is a 63 y.o. female who presents for the above procedure. Past Medical History:    Past Medical History:   Diagnosis Date    Cancer [facility])     Breast:  left mastectomy, chemo radiation    Hypertension     Lymphedema     left arm    RA (rheumatoid arthritis) (HCC)     Trigger finger        Past Surgical History:    Past Surgical History:   Procedure Laterality Date    BREAST SURGERY      MASTECTOMY         Medications:  Current Facility-Administered Medications   Medication Dose Route Frequency Provider Last Rate Last Admin    lidocaine PF 1 % injection 1 mL  1 mL IntraDERmal Once PRN Ubaldo Newsome MD        lactated ringers infusion   IntraVENous Continuous Ubaldo Newsome MD        sodium chloride flush 0.9 % injection 5-40 mL  5-40 mL IntraVENous 2 times per day Ubaldo Newsome MD        sodium chloride flush 0.9 % injection 5-40 mL  5-40 mL IntraVENous PRN Ubaldo Newsome MD        0.9 % sodium chloride infusion   IntraVENous PRN Ubaldo Newsome MD           Allergies:   No Known Allergies              Social   Social History     Tobacco Use    Smoking status: Never Smoker    Smokeless tobacco: Never Used   Substance Use Topics    Alcohol use: Yes     Comment: socially        PSYCH HISTORY:  Depression No  Anxiety No  Suicide No       Family History   Problem Relation Age of Onset    High Blood Pressure Mother     Cancer Father         liver, lung and prostate    Other Sister         MS    Colon Cancer Maternal Grandmother       No family history of colon cancer, Crohn's disease, or ulcerative colitis    Problems with Sedation/Anesthesia in the past? no    REVIEW OF SYSTEMS:  12 point review of systems negative other than mentioned above. PHYSICAL EXAM:    Vitals:  /87   Pulse 101   Temp 96.6 °F (35.9 °C) (Infrared)   Resp 16   Ht 5' 5\" (1.651 m)   Wt 206 lb 8 oz (93.7 kg)   SpO2 99%   BMI 34.36 kg/m²     Focused Exam related to procedure:    General appearance: NAD, conversant   Eyes: anicteric sclerae, moist conjunctivae; no lid-lag; PERRLA   Lungs: CTA, with normal respiratory effort and no intercostal retractions   CV: RRR, no MRGs   Abdomen: Soft, non-tender; no masses or HSM   Skin: Normal temperature, turgor and texture; no rash, ulcers or subcutaneous nodules     DATA:  CBC:   Lab Results   Component Value Date    WBC 8.9 11/30/2021    HGB 15.0 11/30/2021    HCT 47.5 (H) 11/30/2021    MCV 88.6 11/30/2021     11/30/2021     BUN/Cr:   Lab Results   Component Value Date    BUN 8 01/21/2022   ,   Lab Results   Component Value Date    CREATININE 0.77 01/21/2022     Potassium:   Lab Results   Component Value Date    K 3.9 01/21/2022     PT/INR: No results found for: INR, PROTIME    ASSESSMENT AND PLAN:       1. Patient is a 63 y.o. female with above specified procedure planned. Expected Sedation/Anesthesia Type: MAC    2. ASA ([address] Anesthesiology) Anesthesia Status: Class 2 - A normal healthy patient with mild systemic disease    3. Mallampati: II (soft palate, uvula, fauces visible)  4. Procedure options, risks and benefits reviewed with Patient. Patient expresses understanding.     5.  Consent has been signed:  Yes    Jan Kline MD

## 2022-04-26 NOTE — ANESTHESIA POSTPROCEDURE EVALUATION
POST- ANESTHESIA EVALUATION       Pt Name: Dustin Zhu  MRN: [id number]  [place]: 1960  Date of evaluation: 4/26/2022  Time:  12:06 PM      /83   Pulse 83   Temp 97.1 °F (36.2 °C) (Infrared)   Resp 18   Ht 5' 5\" (1.651 m)   Wt 206 lb 8 oz (93.7 kg)   SpO2 99%   BMI 34.36 kg/m²      Consciousness Level  Awake  Cardiopulmonary Status  Stable  Pain Adequately Treated YES  Nausea / Vomiting  NO  Adequate Hydration  YES  Anesthesia Related Complications NONE      Electronically signed by Candelario Burch MD on 4/26/2022 at 12:06 PM       Department of Anesthesiology  Postprocedure Note    Patient: Dustin Zhu  MRN: [id number]  YOB: 1960  Date of evaluation: 4/26/2022  Time:  12:06 PM     Procedure Summary     Date: 04/26/22 Room / Location: [address]    Anesthesia Start: 4577 Anesthesia Stop: 4244    Procedure: COLORECTAL CANCER SCREENING, NOT HIGH RISK (N/A ) Diagnosis: (Z12.11  SCREENING)    Surgeons: Cally Luciano MD Responsible Provider: Candelario Burch MD    Anesthesia Type: MAC, general ASA Status: 2          Anesthesia Type: MAC, general    Hilary Phase I: Hilary Score: 10    Hilary Phase II: Hilary Score: 9    Last vitals: Reviewed and per EMR flowsheets.        Anesthesia Post Evaluation

## 2022-05-05 ENCOUNTER — HOSPITAL ENCOUNTER (OUTPATIENT)
Dept: PHYSICAL THERAPY | Facility: CLINIC | Age: 62
Setting detail: THERAPIES SERIES
Discharge: HOME | End: 2022-05-05
Payer: COMMERCIAL

## 2022-05-05 PROCEDURE — 97530 THERAPEUTIC ACTIVITIES: CPT

## 2022-05-05 PROCEDURE — 97110 THERAPEUTIC EXERCISES: CPT

## 2022-05-05 PROCEDURE — 97140 MANUAL THERAPY 1/> REGIONS: CPT

## 2022-05-05 NOTE — PROGRESS NOTES
[] [facility]) - [facility] &  Therapy  [address]  [phone]  F: [phone] [] [address]  [address]   [address]  P: [phone]  F: [phone] [x] [address] &  Therapy  [address]  P: [phone]  F: [phone] [] [address]  P: [phone]  F: [phone] [] [address]  [facility]   [address]   [place]: [phone]  F: [phone]      Physical Therapy Progress Note    Date: 2022      Patient: Talia May  : 1960  MRN: [id number]    Physician: Justen Blancas MD                             Insurance: [facility] 60 visits per calendar year combined with SLP/PT no copay, no auth required.   Medical Diagnosis: Postmastectomy Lymphedema of left arm       Rehab Codes: I89.0, I97.2  Onset Date: 21                           Next Dr. Carrie Faria: prn  Visit# / total visits:                                 Cancels/No Shows: 0    Subjective:  Pt reports she has been doing ok, wearing compression and jaydon well. Saw lymphedema and notes measurements were down, discharged from OT at this time. States hand cont's to be the most bothersome, needs to f/u w/ supplier to get better fitting wrap. HEP is going well, states she would like to cont w/ PT at this time as still seeing improvements. Pain:  []? Yes  [x]? No   Location: L UE                        Pain Rating: (0-10 scale) 0/10  Pain altered Tx:  [x]? No  []? Yes  Action:  Comments:        Objective:  Test Measurements: L shoulder AROM: flex 145 (PROM 160), abd 150, ER 75, IR T12. Strength grossly 5/5.  L  22 lbs    Function: Increased ease w/ ADL's, improved tissue mobility across chest, shoulder ROM and strength    Assessment:  STG: (to be met in 12 treatments)  1. ? Pain: Stabilize shoulder pain and ensure optimal management of pain during all ADL's (home, occupation, community and recreation)- MET  2. Optimize AROM of bilateral shoulders for functional activity to improve QOL.- MET, was 121° at eval now 160°  3. Increase strength in bilateral shoulders and scapula to grossly 5/5 and L  by 3+ lbs for good postural stability and functional tasks- MET, strength grossly 5/5, L  was 14.5 lbs, today 22 lbs  4. Independent with Home Exercise Programs- progressing towards  5. Education on signs and symptoms, precautions regarding lymphedema- MET         LTG: (to be met in 18 treatments)  1. Pt to report improved sleep- MET  2. Decr BFI score by 1 for improved jaydon to activity and overall increased function- MET, BFI was 2, now 1  3. Continue activity to decrease risk factors associated with increased time being sedentary while undergoing chemotherapy, radiation, surgery. - ongoing  4. Improve tissue mobility of chest wall and axilla to allow for more normal motion, function and lymphatic mobility and drainage- progressing towards  5.    Control/mitigate side effects/late effects of Cancer treatment - ongoing    Treatment Plan:  [x] Therapeutic Exercise   29279  [] Iontophoresis: 4 mg/mL Dexamethasone Sodium Phosphate  mAmin  04502   [x] Therapeutic Activity  40300 [] Vasopneumatic cold with compression  61472    [] Gait Training   84440 [] Ultrasound   41616   [] Neuromuscular Re-education  11954 [] Electrical Stimulation Unattended  15984   [x] Manual Therapy  14298 [] Electrical Stimulation Attended  21349   [x] Instruction in HEP  [] Lumbar/Cervical Traction  49100   [] Aquatic Therapy   16609 [] Cold/hotpack    [] Massage   88098      [] Dry Needling, 1 or 2 muscles  88304   [] Biofeedback, first 15 minutes   40515  [] Biofeedback, additional 15 minutes   25471 [] Dry Needling, 3 or more muscles  26375       Patient Status:     [x] Continue per initial plan of care. [x] Additional visits necessary to further progress towards goals. [] Other:     Requested Frequency/Duration: 1 times per bi-week for 9 treatments. Electronically signed by Justin Napoles PT on 5/5/2022 at 11:55 AM      If you have any questions or concerns, please don't hesitate to call. Thank you for your referral.    Physician Signature:________________________________Date:__________________  By signing above or cosigning this note, I have reviewed this plan of care and certify a need for medically necessary rehabilitation services.      *PLEASE SIGN ABOVE AND FAX BACK ALL PAGES*

## 2022-05-05 NOTE — FLOWSHEET NOTE
[] [address]  [address]  [phone]  F: [phone] [] [address]  [facility]   [address]  P: [phone]  F: [phone] [x] [address] &  Therapy  [address] [facility]  P: [phone]  F: [phone] [] [email or web address]  P: [phone]  F: [phone] [] [address]  [facility]   [address]   [place]: [phone]  F: [phone]      Physical Therapy Daily Treatment Note    Date:  2022  Patient Name:  Dawson Ramirez    :  1960  MRN: [id number]  Physician: Alan Tong MD                             Insurance: [employer] 60 visits per calendar year combined with SLP/PT no copay, no auth required.   Medical Diagnosis: Postmastectomy Lymphedema of left arm       Rehab Codes: I89.0, I97.2  Onset Date: 21   Next Dr. Krishnan Brie: prn  Visit# / total visits:      Cancels/No Shows: 0    Subjective:  Pt reports she has been doing ok, wearing compression and jaydon well. Notes hand cont's to be the most bothersome. States she has not yet followed up w/ Lymphedema/supplier to get better fitting wrap. HEP is going well. Pain:  [] Yes  [x] No Location: L UE  Pain Rating: (0-10 scale) 0/10  Pain altered Tx:  [x] No  [] Yes  Action:  Comments:      Objective:  Modalities:   Precautions: L BrCa, L mastectomy 23 LN removed , Chemo -2012 then radiation. Tamoxifen 5 yrs, anestrozol for 4 yrs-jt pain, no longer taking  Manual: Utilized PORi breast protocol to LEFT upper quarter for gentle manual lymphatic drainage, myofascial release and joint mobility to facilitate better function ROM and dynamics of lymph system.   Exercises:  Exercise Reps/ Time Weight/ Level Comments             Diaphragmatic breathing 5 cycles       Elbow winging supine 1m  HEP     Wand flexion  10x  HEP     Bye, bye ex 3x   Flex, abd          post shld rolls, shrugs  10x       Scapular retraction  10x 5 sec      wall slide  10x HEP flex    pec stretch at door  3x30   varied arm position          SL scap Abd, ER, HAB 10x     OH flex at wall 10x     Wall angels 10x     UT stretch 3x15           UE tband: ext, row, ER, tricep, bicep 10-15x ea Lime NT   Free weight: bicep, 3 way fly, bicep, tricep 10x ea 2# HEP         Piriformis stretch 3x30     Core stab: Tra contr, march 10x ea *    bridges 10x     Standing: HT, marches, HS curl, 3 way hip squat 10x ea     HS, calf stretch at step 3x30             Other: L shoulder ROM supine:  160° pulling in axilla   ULTT:L -     Treatment Charges: Mins Units   []  Modalities     []  Ther Exercise 25 2   [x]  Manual Therapy 15 1   [x]  Ther Activities 15 1   []  Aquatics     []  Vasocompression     []  Other     Total Treatment time 55        Assessment: [x] Progressing toward goals. Completed partial PORI breast protocol to L upper quarter. No sig fullness noted upper arm, mod fullness in forearm and hand. Rev HEP and added standing ex and stretches as noted. Issued HO for HEP. Emphasized pertinent ex to complete daily as well as cont'd postural awareness and monitoring lymphedema. Discussed trial w/o arm compression at night as pt notes hand always more swollen, but not swollen if she doesn't wear sleeve. Will cont bi-weekly for manual and ex progression as jaydon. Will request additional visits at this time. [] No change. [x] Other: L shoulder AROM: flex 145 (PROM 160), abd 150, ER 75, IR T12.  Strength grossly 5/5.  22 lbs    [x] Patient would continue to benefit from skilled physical therapy services in order to: address the following goals    STG: (to be met in 12 treatments)  1. ? Pain: Stabilize shoulder pain and ensure optimal management of pain during all ADL's (home, occupation, community and recreation)- MET  2. Optimize AROM of bilateral shoulders for functional activity to improve QOL.- MET, was 121° at eval now 160°  3. Increase strength in bilateral shoulders and scapula to grossly 5/5 and L  by 3+ lbs for good postural stability and functional tasks- MET,  was 14.5 lbs, today 22 lbs  4. Independent with Home Exercise Programs  5. Education on signs and symptoms, precautions regarding lymphedema- MET         LTG: (to be met in 18 treatments)  1. Pt to report improved sleep- MET  2. Decr BFI score by 1 for improved jaydon to activity and overall increased function- MET, BFI was 2, now 1  3. Continue activity to decrease risk factors associated with increased time being sedentary while undergoing chemotherapy, radiation, surgery. 4.   Improve tissue mobility of chest wall and axilla to allow for more normal motion, function and lymphatic mobility and drainage  5. Control/mitigate side effects/late effects of Cancer treatment - ongoing      Pt. Education:  [x] Yes  [] No  [x] Reviewed Prior HEP/Ed  Method of Education: [x] Verbal  [x] Demo  [x] Written  Comprehension of Education:  [x] Verbalizes understanding. [] Demonstrates understanding. [] Needs review. [] Demonstrates/verbalizes HEP/Ed previously given. Plan: [x] Continue current frequency toward long and short term goals.     [] Specific Instructions for subsequent treatments:        Time In: 1000 am           Time Out: 1100 pm    Electronically signed by:  Nicole Nicholas PT , NEELAM

## 2022-05-24 ENCOUNTER — HOSPITAL ENCOUNTER (OUTPATIENT)
Dept: PHYSICAL THERAPY | Facility: CLINIC | Age: 62
Setting detail: THERAPIES SERIES
Discharge: HOME | End: 2022-05-24
Payer: COMMERCIAL

## 2022-05-24 PROCEDURE — 97110 THERAPEUTIC EXERCISES: CPT

## 2022-05-24 PROCEDURE — 97140 MANUAL THERAPY 1/> REGIONS: CPT

## 2022-05-24 NOTE — FLOWSHEET NOTE
[] [address]  [address]  [phone]  F: [phone] [] [address]  [facility] 36   [address]  P: [phone]  F: [phone] [x] [address]  Therapy  [facility]  P: [phone]  F: [phone] [] [address]  P: [phone]  F: [phone] [] [address]  [facility]   [address]   [place]: [phone]  F: [phone]      Physical Therapy Daily Treatment Note    Date:  2022  Patient Name:  Almas Estimable    :  1960  MRN: [id number]  Physician: Tera Mays MD                             Insurance: [facility] 60 visits per calendar year combined with SLP/PT no copay, no auth required.   Medical Diagnosis: Postmastectomy Lymphedema of left arm       Rehab Codes: I89.0, I97.2  Onset Date: 21   Next  61 [facility]: prn  Visit# / total visits:      Cancels/No Shows: 0    Subjective:  Pt reports she has some incr pain L knee, states will need a TKA in time, wishes to hold off standing ex today. Has been doing trial of no compression at night and notes hand is less swollen in the morning-encouraged to cont to try different wearing schedules to see if any sustainable change in hand swelling. .   Pain:  [] Yes  [x] No Location: L UE  Pain Rating: (0-10 scale) 0/10  Pain altered Tx:  [x] No  [] Yes  Action:  Comments:      Objective:  Modalities:   Precautions: L BrCa, L mastectomy 23 LN removed , Chemo 1-2012 then radiation.  Tamoxifen 5 yrs, anestrozol for 4 yrs-jt pain, no longer taking  Manual: Utilized PORi breast protocol to LEFT upper quarter for gentle manual lymphatic drainage, myofascial release and joint mobility to facilitate better function ROM and dynamics of lymph system. Exercises:  Exercise Reps/ Time Weight/ Level Comments             Diaphragmatic breathing 5 cycles       Elbow winging supine 1m  HEP     Wand flexion  10x  HEP     Bye, bye ex 3x   Flex, abd          post shld rolls, shrugs  10x       Scapular retraction  10x 5 sec      wall slide  10x HEP flex    pec stretch at door  3x30   varied arm position          SL scap Abd, ER, HAB 10x     OH flex at wall 10x     Wall angels 10x     UT stretch 3x15           UE tband: ext, row, ER, tricep, bicep 10-15x ea Lime NT   Free weight: bicep, 3 way fly, bicep, tricep 10x ea 2# HEP         Piriformis stretch 3x30     Core stab: Tra contr, march 10x ea *    bridges 10x     Standing: HT, marches, HS curl, 3 way hip squat 10x ea     HS, calf stretch at step 3x30             Other: L shoulder ROM supine:  160° pulling in axilla   ULTT:L -     Treatment Charges: Mins Units   []  Modalities     []  Ther Exercise 10 1   [x]  Manual Therapy 45 3   [x]  Ther Activities     []  Aquatics     []  Vasocompression     []  Other     Total Treatment time 55        Assessment: [x] Progressing toward goals. Completed PORI breast protocol to L upper quarter. No sig fullness noted upper arm, mod fullness in forearm and hand. Rev HEP and emphasized pertinent ex to complete daily as well as cont'd postural awareness and monitoring lymphedema. Will cont bi-weekly for manual and ex progression as jaydon. Pt noted new ins in July. [] No change. [] Other:     [x] Patient would continue to benefit from skilled physical therapy services in order to: address the following goals    STG: (to be met in 12 treatments)  1. ? Pain: Stabilize shoulder pain and ensure optimal management of pain during all ADL's (home, occupation, community and recreation)- MET  2. Optimize AROM of bilateral shoulders for functional activity to improve QOL.- MET, was 121° at eval now 160°  3.  Increase strength in bilateral shoulders and scapula to grossly 5/5 and L  by 3+ lbs for good postural stability and functional tasks- MET,  was 14.5 lbs, today 22 lbs  4. Independent with Home Exercise Programs  5. Education on signs and symptoms, precautions regarding lymphedema- MET         LTG: (to be met in 18 treatments)  1. Pt to report improved sleep- MET  2. Decr BFI score by 1 for improved jaydon to activity and overall increased function- MET, BFI was 2, now 1  3. Continue activity to decrease risk factors associated with increased time being sedentary while undergoing chemotherapy, radiation, surgery. 4.   Improve tissue mobility of chest wall and axilla to allow for more normal motion, function and lymphatic mobility and drainage  5. Control/mitigate side effects/late effects of Cancer treatment - ongoing      Pt. Education:  [x] Yes  [] No  [x] Reviewed Prior HEP/Ed  Method of Education: [x] Verbal  [x] Demo  [x] Written  Comprehension of Education:  [x] Verbalizes understanding. [] Demonstrates understanding. [] Needs review. [] Demonstrates/verbalizes HEP/Ed previously given. Plan: [x] Continue current frequency toward long and short term goals.     [] Specific Instructions for subsequent treatments:        Time In: 1130 am           Time Out: 1225 pm    Electronically signed by:  Yaneth Corrales, PT , NEELAM

## 2022-06-09 ENCOUNTER — APPOINTMENT (OUTPATIENT)
Dept: PHYSICAL THERAPY | Facility: CLINIC | Age: 62
End: 2022-06-09
Payer: COMMERCIAL

## 2022-06-15 ENCOUNTER — OFFICE VISIT (OUTPATIENT)
Dept: PRIMARY CARE CLINIC | Age: 62
End: 2022-06-15
Payer: COMMERCIAL

## 2022-06-15 ENCOUNTER — HOSPITAL ENCOUNTER (OUTPATIENT)
Age: 62
Setting detail: SPECIMEN
Discharge: HOME | End: 2022-06-15

## 2022-06-15 VITALS
RESPIRATION RATE: 16 BRPM | OXYGEN SATURATION: 98 % | HEIGHT: 65 IN | SYSTOLIC BLOOD PRESSURE: 122 MMHG | DIASTOLIC BLOOD PRESSURE: 80 MMHG | BODY MASS INDEX: 34.26 KG/M2 | HEART RATE: 96 BPM | WEIGHT: 205.6 LBS

## 2022-06-15 DIAGNOSIS — E66.09 CLASS 1 OBESITY DUE TO EXCESS CALORIES WITH SERIOUS COMORBIDITY AND BODY MASS INDEX (BMI) OF 34.0 TO 34.9 IN ADULT: ICD-10-CM

## 2022-06-15 DIAGNOSIS — R74.8 ELEVATED ALKALINE PHOSPHATASE LEVEL: ICD-10-CM

## 2022-06-15 DIAGNOSIS — M25.562 CHRONIC PAIN OF BOTH KNEES: ICD-10-CM

## 2022-06-15 DIAGNOSIS — G89.29 CHRONIC PAIN OF BOTH KNEES: ICD-10-CM

## 2022-06-15 DIAGNOSIS — M06.9 RHEUMATOID ARTHRITIS INVOLVING BOTH HANDS, UNSPECIFIED WHETHER RHEUMATOID FACTOR PRESENT (HCC): ICD-10-CM

## 2022-06-15 DIAGNOSIS — I10 PRIMARY HYPERTENSION: Primary | ICD-10-CM

## 2022-06-15 DIAGNOSIS — M25.561 CHRONIC PAIN OF BOTH KNEES: ICD-10-CM

## 2022-06-15 LAB
C-REACTIVE PROTEIN: 12.7 MG/L (ref 0–5)
SEDIMENTATION RATE, ERYTHROCYTE: 35 MM/HR (ref 0–30)

## 2022-06-15 PROCEDURE — 99214 OFFICE O/P EST MOD 30 MIN: CPT | Performed by: NURSE PRACTITIONER

## 2022-06-15 ASSESSMENT — ENCOUNTER SYMPTOMS
EYE DISCHARGE: 0
VOMITING: 0
ABDOMINAL PAIN: 0
TROUBLE SWALLOWING: 0
CHEST TIGHTNESS: 0
EYE ITCHING: 0
COUGH: 0
NAUSEA: 0
EYE REDNESS: 0
DIARRHEA: 0
SORE THROAT: 0
SHORTNESS OF BREATH: 0
SINUS PAIN: 0
SINUS PRESSURE: 0
WHEEZING: 0

## 2022-06-15 NOTE — PROGRESS NOTES
[address] PRIMARY CARE  [address] [facility] 1560  [address] [address]  Dept: [phone]  Dept Fax: [phone]    Dawson Ramirez is a 63 y.o. female who presents today for her medical conditions/complaintsas noted below. Dawson Ramirez is c/o of Discuss Labs (liver enzymes), Weight Management (discuss injectable for weight loss), and Knee Pain (left knee)        HPI:     Patient presents for follow-up  Blood pressure stable  Weight is stable    Patient presents for a follow-up. She wants to discuss her lab work, knee pain and weight loss. She is going to be changing insurances on . She will have to restart pain and her deductible again. Gel injections not covered by insurance for her knee. Her knee is affecting her quality of life. She is following in orthopedics and family. She is taking 1/2-1 full Advil 1-2 times daily. She develop with her rheumatologist.  She discussed her liver enzymes. He was not concerned with this. She would like to try to lose weight. It is hard for her to lose weight by exercise due to her knee pain she cannot walk long distances. She is interested in possible weight loss options if they are covered by her insurance    Otherwise she is doing okay.   She has no further questions or concerns      Past Medical History:   Diagnosis Date    Cancer [facility])     Breast:  left mastectomy, chemo radiation    Hypertension     Lymphedema     left arm    RA (rheumatoid arthritis) ([address]     Trigger finger       Past Surgical History:   Procedure Laterality Date    BREAST SURGERY      COLONOSCOPY N/A 2022     COLORECTAL CANCER SCREENING, NOT HIGH RISK (N/A )    COLONOSCOPY N/A 2022    COLORECTAL CANCER SCREENING, NOT HIGH RISK performed by Ronit Castanon MD at [facility]         Family History   Problem Relation Age of Onset    High Blood Pressure Mother     Cancer Father liver, lung and prostate    Other Sister         MS    Colon Cancer Maternal Grandmother        Social History     Tobacco Use    Smoking status: Never Smoker    Smokeless tobacco: Never Used   Substance Use Topics    Alcohol use: Yes     Comment: socially      Current Outpatient Medications   Medication Sig Dispense Refill    polyethylene glycol (GLYCOLAX) 17 GM/SCOOP powder Take as directed for bowel prep/colonoscopy 289 g 0    bisacodyl (BISACODYL) 5 MG EC tablet Take as directed for bowel prep/colonoscopy 4 tablet 0    amLODIPine (NORVASC) 5 MG tablet Take 1 tablet by mouth daily 90 tablet 1    Naproxen Sodium (ALEVE PO) Take 220 mg by mouth 2 times daily      hydroxychloroquine (PLAQUENIL) 200 MG tablet Take 200 mg by mouth 2 times daily       No current facility-administered medications for this visit. No Known Allergies    Health Maintenance   Topic Date Due    HIV screen  Never done    Hepatitis C screen  Never done    Shingles vaccine (1 of 2) Never done    Flu vaccine (Season Ended) 12/14/2022 (Originally 9/1/2022)    DTaP/Tdap/Td vaccine (1 - Tdap) 03/15/2023 (Originally 11/15/1981)    COVID-19 Vaccine (2 - Jesica Barrs 3-dose series) 03/15/2023 (Originally 10/28/2021)    Breast cancer screen  02/11/2023    Depression Screen  03/15/2023    Lipids  11/30/2026    Colorectal Cancer Screen  04/26/2032    Hepatitis A vaccine  Aged Out    Hepatitis B vaccine  Aged Out    Hib vaccine  Aged Out    Meningococcal (ACWY) vaccine  Aged Out    Pneumococcal 0-64 years Vaccine  Aged Out       :     Review of Systems   Constitutional: Negative for chills, fatigue and fever. HENT: Negative for ear discharge, ear pain, sinus pressure, sinus pain, sore throat and trouble swallowing. Eyes: Negative for discharge, redness and itching. Respiratory: Negative for cough, chest tightness, shortness of breath and wheezing. Cardiovascular: Negative for chest pain.    Gastrointestinal: Negative for abdominal pain, diarrhea, nausea and vomiting. Genitourinary: Negative for difficulty urinating. Musculoskeletal: Positive for arthralgias (Left knee pain). Negative for neck pain. Skin: Negative for rash. Neurological: Negative for dizziness, weakness, light-headedness and headaches. All other systems reviewed and are negative. Objective:     Physical Exam  Constitutional:       Appearance: Normal appearance. She is obese. HENT:      Head: Normocephalic and atraumatic. Nose: Nose normal.   Eyes:      Extraocular Movements: Extraocular movements intact. Conjunctiva/sclera: Conjunctivae normal.      Pupils: Pupils are equal, round, and reactive to light. Cardiovascular:      Rate and Rhythm: Normal rate and regular rhythm. Pulses: Normal pulses. Heart sounds: Normal heart sounds. Pulmonary:      Effort: Pulmonary effort is normal.      Breath sounds: Normal breath sounds. Abdominal:      General: Abdomen is flat. Palpations: Abdomen is soft. Musculoskeletal:         General: Normal range of motion. Cervical back: Neck supple. Comments: RA noted to both hands   Skin:     General: Skin is warm and dry. Capillary Refill: Capillary refill takes less than 2 seconds. Neurological:      General: No focal deficit present. Mental Status: She is alert and oriented to person, place, and time. Psychiatric:         Mood and Affect: Mood normal.       /80 (Site: Left Upper Arm, Position: Sitting, Cuff Size: Large Adult)   Pulse 96   Resp 16   Ht 5' 5\" (1.651 m)   Wt 205 lb 9.6 oz (93.3 kg)   SpO2 98%   Breastfeeding No   BMI 34.21 kg/m²     Assessment:       Diagnosis Orders   1. Primary hypertension     2. Elevated alkaline phosphatase level     3. Class 1 obesity due to excess calories with serious comorbidity and body mass index (BMI) of 34.0 to 34.9 in adult     4.  Rheumatoid arthritis involving both hands, unspecified whether rheumatoid factor present ([facility] [address]     5. Chronic pain of both knees         :      Return in about 3 months (around 9/15/2022) for general follow up. 1.  Hypertension  -Blood pressure stable today. Continue with amlodipine 5 mg daily  2. Elevated alkaline phosphatase level  -Levels have been stable. We will continue to monitor. This is likely related to her rheumatoid arthritis  3. Obesity  -Patient was given a list of options to follow-up with her insurance company with when she changes insurance. She is to let me know if she would like to go with any of those options prior to her next appointment  4. Rheumatoid arthritis  -Follow with rheumatology. Continue with Plaquenil 200 mg twice a day  5. Chronic pain of knees  -Patient continue with Aleve. We also discussed alternating with extra drink Tylenol. She was encouraged to think about possible gel injections as this may help improve her quality of life. Follow-up orthopedics    Patient to follow-up in 3 months. She may contact sooner if needed. She is agreeable to this plan of care   Patient given educational materials - seepatient instructions. Discussed use, benefit, and side effects of prescribed medications. All patient questions answered. Pt voiced understanding. Reviewed health maintenance. Instructed to continue current medications, diet and exercise. Patient agreedwith treatment plan. Follow up as directed.       Electronically signed by DOV Martinez CNP on 6/15/2022at 12:24 PM

## 2022-06-23 ENCOUNTER — HOSPITAL ENCOUNTER (OUTPATIENT)
Dept: PHYSICAL THERAPY | Facility: CLINIC | Age: 62
Setting detail: THERAPIES SERIES
Discharge: HOME | End: 2022-06-23
Payer: COMMERCIAL

## 2022-06-23 PROCEDURE — 97110 THERAPEUTIC EXERCISES: CPT

## 2022-06-23 PROCEDURE — 97140 MANUAL THERAPY 1/> REGIONS: CPT

## 2022-06-23 NOTE — FLOWSHEET NOTE
[] [address]  [address]  [phone]  F: [phone] [] [address]  [facility] 36   [address]  P: [phone]  F: [phone] [x] [address]  Therapy  [facility]  P: [phone]  F: [phone] [] [address]  P: [phone]  F: [phone] [] [address]  [facility]   [address]   [place]: [phone]  F: [phone]      Physical Therapy Daily Treatment Note    Date:  2022  Patient Name:  Best Dockery    :  1960  MRN: [id number]  Physician: Janelle Martinez MD                             Insurance: [facility] 60 visits per calendar year combined with SLP/PT no copay, no auth required.   Medical Diagnosis: Postmastectomy Lymphedema of left arm       Rehab Codes: I89.0, I97.2  Onset Date: 21   Next Dr. Susan Milligan: prn  Visit# / total visits:      Cancels/No Shows: 0    Subjective:  Pt reports she was able to get a new sleeve for night and feels helpful. Hand has been staying down better. Notes compliance w/ HEP and staying very active, hasnt done weights much though recently. Pain:  [] Yes  [x] No Location: L UE  Pain Rating: (0-10 scale) 0/10  Pain altered Tx:  [x] No  [] Yes  Action:  Comments:      Objective:  Modalities:   Precautions: L BrCa, L mastectomy 23 LN removed , Chemo 1-2012 then radiation. Tamoxifen 5 yrs, anestrozol for 4 yrs-jt pain, no longer taking  Manual: Utilized PORi breast protocol to LEFT upper quarter for gentle manual lymphatic drainage, myofascial release and joint mobility to facilitate better function ROM and dynamics of lymph system.   Exercises:  Exercise Reps/ Time Weight/ Level Comments             Diaphragmatic breathing 5 cycles     Elbow winging supine 1m  HEP     Wand flexion  10x  HEP     Bye, bye ex 3x   Flex, abd          post shld rolls, shrugs  10x       Scapular retraction  10x 5 sec      wall slide  10x HEP flex    pec stretch at door  3x30   varied arm position          SL scap Abd, ER, HAB 10x     OH flex at wall 10x     Wall angels 10x     UT stretch 3x15           UE tband: ext, row, ER, tricep, bicep 10-15x ea Lime NT, prefers hand weights   Free weight: bicep, 3 way fly, bicep, tricep 10x ea 2# HEP         Piriformis stretch 3x30     Core stab: Tra contr, march 10x ea *    bridges 10x     Standing: HT, marches, HS curl, 3 way hip squat 10x ea     HS, calf stretch at step 3x30             Other: L shoulder ROM supine:  165° pulling in axilla   ULTT:L -     Treatment Charges: Mins Units   []  Modalities     []  Ther Exercise 15 1   [x]  Manual Therapy 40 3   [x]  Ther Activities     []  Aquatics     []  Vasocompression     []  Other     Total Treatment time 55        Assessment: [x] Progressing toward goals. Completed PORI breast protocol to L upper quarter. Pt maintaining ROM well and improved tissue mobility noted. Decr fullness in axilla, forearm and hand. Rev HEP and completed ex per log. Mod cues for core stab ex. Emphasized pertinent ex to complete daily as well as cont'd postural awareness and monitoring lymphedema. Will see pt next week then pt wishes to be placed on hold to see how she does w/ Indep HEP as new insurance July 1. [] No change. [] Other:     [x] Patient would continue to benefit from skilled physical therapy services in order to: address the following goals    STG: (to be met in 12 treatments)  1. ? Pain: Stabilize shoulder pain and ensure optimal management of pain during all ADL's (home, occupation, community and recreation)- MET  2. Optimize AROM of bilateral shoulders for functional activity to improve QOL.- MET, was 121° at eval now 160°  3.  Increase strength in bilateral shoulders and scapula to grossly 5/5 and L  by 3+ lbs for good postural stability and functional tasks- MET,  was 14.5 lbs, today 22 lbs  4. Independent with Home Exercise Programs  5. Education on signs and symptoms, precautions regarding lymphedema- MET         LTG: (to be met in 18 treatments)  1. Pt to report improved sleep- MET  2. Decr BFI score by 1 for improved jaydon to activity and overall increased function- MET, BFI was 2, now 1  3. Continue activity to decrease risk factors associated with increased time being sedentary while undergoing chemotherapy, radiation, surgery. - ongoing  4. Improve tissue mobility of chest wall and axilla to allow for more normal motion, function and lymphatic mobility and drainage- MET  5. Control/mitigate side effects/late effects of Cancer treatment - ongoing      Pt. Education:  [x] Yes  [] No  [x] Reviewed Prior HEP/Ed  Method of Education: [x] Verbal  [x] Demo  [x] Written  Comprehension of Education:  [x] Verbalizes understanding. [] Demonstrates understanding. [] Needs review. [] Demonstrates/verbalizes HEP/Ed previously given. Plan: [x] Continue current frequency toward long and short term goals.     [] Specific Instructions for subsequent treatments:        Time In: 11:33 am           Time Out: 1230 pm    Electronically signed by:  Justin Napoles PT , NEELAM

## 2022-06-30 ENCOUNTER — HOSPITAL ENCOUNTER (OUTPATIENT)
Dept: PHYSICAL THERAPY | Facility: CLINIC | Age: 62
Setting detail: THERAPIES SERIES
Discharge: HOME | End: 2022-06-30
Payer: COMMERCIAL

## 2022-06-30 PROCEDURE — 97140 MANUAL THERAPY 1/> REGIONS: CPT

## 2022-06-30 PROCEDURE — 97110 THERAPEUTIC EXERCISES: CPT

## 2022-06-30 NOTE — FLOWSHEET NOTE
[] [address]  [address]  [phone]  F: [phone] [] [address]  [facility] 36   [address]  P: [phone]  F: [phone] [x] 96 [facility] &  Therapy  [address] [facility]  P: [phone]  F: [phone] [] [address]  P: [phone]  F: [phone] [] [address]  [facility]   [address]   [place]: [phone]  F: [phone]      Physical Therapy Daily Treatment Note    Date:  2022  Patient Name:  Marcia Paez    :  1960  MRN: [id number]  Physician: Ashley Sanchez MD                             Insurance: [employer] 60 visits per calendar year combined with SLP/PT no copay, no auth required.   Medical Diagnosis: Postmastectomy Lymphedema of left arm       Rehab Codes: I89.0, I97.2  Onset Date: 21   Next  61 [facility]: prn  Visit# / total visits:      Cancels/No Shows: 0    Subjective:  Pt reports she was able to get a new sleeve for night and feels helpful. Hand has been staying down better. Notes compliance w/ HEP and staying very active, hasnt done weights much though recently. Pain:  [] Yes  [x] No Location: L UE  Pain Rating: (0-10 scale) 0/10  Pain altered Tx:  [x] No  [] Yes  Action:  Comments:      Objective:  Modalities:   Precautions: L BrCa, L mastectomy 23 LN removed , Chemo 1-2012 then radiation. Tamoxifen 5 yrs, anestrozol for 4 yrs-jt pain, no longer taking  Manual: Utilized PORi breast protocol to LEFT upper quarter for gentle manual lymphatic drainage, myofascial release and joint mobility to facilitate better function ROM and dynamics of lymph system.   Exercises:  Exercise Reps/ Time Weight/ Level Comments             Diaphragmatic breathing 5 cycles     Elbow winging supine 1m  HEP     Wand flexion  10x  HEP     Bye, bye ex 3x   Flex, abd          post shld rolls, shrugs  10x       Scapular retraction  10x 5 sec      wall slide  10x HEP flex    pec stretch at door  3x30   varied arm position          SL scap Abd, ER, HAB 10x     OH flex at wall 10x     Wall angels 10x     UT stretch 3x15           UE tband: ext, row, ER, tricep, bicep 10-15x ea Lime NT, prefers hand weights   Free weight: bicep, 3 way fly, bicep, tricep 10x ea 2# HEP         Piriformis stretch 3x30     Core stab: Tra contr, march 10x ea *    bridges 10x     Standing: HT, marches, HS curl, 3 way hip squat 10x ea     HS, calf stretch at step 3x30             Other: L shoulder ROM supine:  165° pulling in axilla   ULTT:L -    Treatment Charges: Mins Units   []  Modalities     []  Ther Exercise 15 1   [x]  Manual Therapy 40 3   [x]  Ther Activities     []  Aquatics     []  Vasocompression     []  Other     Total Treatment time 55        Assessment: [x] Progressing toward goals. Completed PORI breast protocol to L upper quarter. Pt maintaining ROM well and improved tissue mobility noted throughout axilla and chest. Decr fullness in axilla, forearm and hand. Rev HEP and completed ex per log. Mod cues for core stab ex. Emphasized pertinent ex to complete daily as well as cont'd postural awareness and monitoring lymphedema. Re issued all HO's for HEP. Will wishes to be placed on hold to see how she does w/ Indep HEP as new insurance July 1, if no appt made in 30 days will discharge. [] No change. [] Other:     [x] Patient would continue to benefit from skilled physical therapy services in order to: address the following goals    L shoulder AROM: flex 160, abd 160, ER 89, IR T12.  Strength grossly 5/5   25.6, 21.8, 24.2  R 41.6, 35.8, 36.2    STG: (to be met in 12 treatments)  1. ? Pain: Stabilize shoulder pain and ensure optimal management of pain during all ADL's (home, occupation, community and recreation)- MET  2. Optimize AROM of bilateral shoulders for functional activity to improve QOL.- MET, was 121° at eval now 160°  3. Increase strength in bilateral shoulders and scapula to grossly 5/5 and L  by 3+ lbs for good postural stability and functional tasks- MET,  was 14.5 lbs, today 22 lbs  4. Independent with Home Exercise Programs- MET  5. Education on signs and symptoms, precautions regarding lymphedema- MET         LTG: (to be met in 18 treatments)  1. Pt to report improved sleep- MET  2. Decr BFI score by 1 for improved jaydon to activity and overall increased function- MET, BFI was 2, now 0  3. Continue activity to decrease risk factors associated with increased time being sedentary while undergoing chemotherapy, radiation, surgery. - ongoing  4. Improve tissue mobility of chest wall and axilla to allow for more normal motion, function and lymphatic mobility and drainage- MET  5. Control/mitigate side effects/late effects of Cancer treatment - ongoing      Pt. Education:  [x] Yes  [] No  [x] Reviewed Prior HEP/Ed  Method of Education: [x] Verbal  [x] Demo  [x] Written  Comprehension of Education:  [] Verbalizes understanding. [] Demonstrates understanding. [] Needs review. [x] Demonstrates/verbalizes HEP/Ed previously given. Plan: [x] Continue current frequency toward long and short term goals.     [x] Specific Instructions for subsequent treatments: discharge to [facility] HEP if no appts made in 30 days       Time In: 11:30 am           Time Out: 12:30 pm    Electronically signed by:  Kathryn Wilson, PT , NEELAM

## 2022-10-31 RX ORDER — AMLODIPINE BESYLATE 5 MG/1
5 TABLET ORAL DAILY
Qty: 90 TABLET | Refills: 1 | Status: SHIPPED | OUTPATIENT
Start: 2022-10-31

## 2023-01-18 ENCOUNTER — OFFICE VISIT (OUTPATIENT)
Dept: PRIMARY CARE CLINIC | Age: 63
End: 2023-01-18
Payer: COMMERCIAL

## 2023-01-18 VITALS
BODY MASS INDEX: 34.41 KG/M2 | WEIGHT: 206.8 LBS | HEART RATE: 96 BPM | RESPIRATION RATE: 16 BRPM | TEMPERATURE: 98.4 F | OXYGEN SATURATION: 97 % | SYSTOLIC BLOOD PRESSURE: 116 MMHG | DIASTOLIC BLOOD PRESSURE: 88 MMHG

## 2023-01-18 DIAGNOSIS — G56.01 CARPAL TUNNEL SYNDROME OF RIGHT WRIST: ICD-10-CM

## 2023-01-18 DIAGNOSIS — I10 PRIMARY HYPERTENSION: Primary | ICD-10-CM

## 2023-01-18 DIAGNOSIS — M06.9 RHEUMATOID ARTHRITIS INVOLVING BOTH HANDS, UNSPECIFIED WHETHER RHEUMATOID FACTOR PRESENT (HCC): ICD-10-CM

## 2023-01-18 DIAGNOSIS — Z12.31 SCREENING MAMMOGRAM FOR BREAST CANCER: ICD-10-CM

## 2023-01-18 PROCEDURE — 3079F DIAST BP 80-89 MM HG: CPT | Performed by: NURSE PRACTITIONER

## 2023-01-18 PROCEDURE — 99214 OFFICE O/P EST MOD 30 MIN: CPT | Performed by: NURSE PRACTITIONER

## 2023-01-18 PROCEDURE — G8417 CALC BMI ABV UP PARAM F/U: HCPCS | Performed by: NURSE PRACTITIONER

## 2023-01-18 PROCEDURE — 1036F TOBACCO NON-USER: CPT | Performed by: NURSE PRACTITIONER

## 2023-01-18 PROCEDURE — G8484 FLU IMMUNIZE NO ADMIN: HCPCS | Performed by: NURSE PRACTITIONER

## 2023-01-18 PROCEDURE — 3074F SYST BP LT 130 MM HG: CPT | Performed by: NURSE PRACTITIONER

## 2023-01-18 PROCEDURE — G8427 DOCREV CUR MEDS BY ELIG CLIN: HCPCS | Performed by: NURSE PRACTITIONER

## 2023-01-18 PROCEDURE — 3017F COLORECTAL CA SCREEN DOC REV: CPT | Performed by: NURSE PRACTITIONER

## 2023-01-18 RX ORDER — AZELASTINE 1 MG/ML
2 SPRAY, METERED NASAL 2 TIMES DAILY
Qty: 60 ML | Refills: 0 | Status: SHIPPED | OUTPATIENT
Start: 2023-01-18

## 2023-01-18 SDOH — ECONOMIC STABILITY: FOOD INSECURITY: WITHIN THE PAST 12 MONTHS, YOU WORRIED THAT YOUR FOOD WOULD RUN OUT BEFORE YOU GOT MONEY TO BUY MORE.: NEVER TRUE

## 2023-01-18 SDOH — ECONOMIC STABILITY: FOOD INSECURITY: WITHIN THE PAST 12 MONTHS, THE FOOD YOU BOUGHT JUST DIDN'T LAST AND YOU DIDN'T HAVE MONEY TO GET MORE.: NEVER TRUE

## 2023-01-18 ASSESSMENT — ENCOUNTER SYMPTOMS
ABDOMINAL PAIN: 0
COUGH: 0
SINUS PAIN: 0
EYE REDNESS: 0
TROUBLE SWALLOWING: 0
SHORTNESS OF BREATH: 0
EYE DISCHARGE: 0
WHEEZING: 0
SINUS PRESSURE: 0
NAUSEA: 0
CHEST TIGHTNESS: 0
DIARRHEA: 0
SORE THROAT: 0
VOMITING: 0
EYE ITCHING: 0

## 2023-01-18 ASSESSMENT — PATIENT HEALTH QUESTIONNAIRE - PHQ9
SUM OF ALL RESPONSES TO PHQ QUESTIONS 1-9: 0
SUM OF ALL RESPONSES TO PHQ QUESTIONS 1-9: 0
SUM OF ALL RESPONSES TO PHQ9 QUESTIONS 1 & 2: 0
SUM OF ALL RESPONSES TO PHQ QUESTIONS 1-9: 0
1. LITTLE INTEREST OR PLEASURE IN DOING THINGS: 0
SUM OF ALL RESPONSES TO PHQ QUESTIONS 1-9: 0
2. FEELING DOWN, DEPRESSED OR HOPELESS: 0

## 2023-01-18 ASSESSMENT — SOCIAL DETERMINANTS OF HEALTH (SDOH): HOW HARD IS IT FOR YOU TO PAY FOR THE VERY BASICS LIKE FOOD, HOUSING, MEDICAL CARE, AND HEATING?: NOT HARD AT ALL

## 2023-01-18 NOTE — PROGRESS NOTES
[address] PRIMARY CARE  [address] Twyla Wilkes [address]38 [address] [id number]  Dept: [phone]  Dept Fax: [phone]    Alexx Wing is a 63 y.o. female who presents today for her medical conditions/complaintsas noted below. Alexx Wing is c/o of Pre-op Exam        HPI:     Patient presents for an office visit for preoperative clearance  Blood pressure stable  Weight is stable    Patient presents for an office visit for preoperative clearance. Going to be having carpal tunnel surgery on . She had covid on amber day. She has lingering congestion and a cough. The cough is worse in the morning gets better throughout the day.   Otherwise improving    Patient already had preoperative lab work and EKG done      Past Medical History:   Diagnosis Date    Cancer [facility])     Breast:  left mastectomy, chemo radiation    Hypertension     Lymphedema     left arm    RA (rheumatoid arthritis) (HCC)     Trigger finger       Past Surgical History:   Procedure Laterality Date    BREAST SURGERY      COLONOSCOPY N/A 2022     COLORECTAL CANCER SCREENING, NOT HIGH RISK (N/A )    COLONOSCOPY N/A 2022    COLORECTAL CANCER SCREENING, NOT HIGH RISK performed by Samantha Loco MD at [address] [place] [address]         Family History   Problem Relation Age of Onset    High Blood Pressure Mother     Cancer Father         liver, lung and prostate    Other Sister         MS    Colon Cancer Maternal Grandmother        Social History     Tobacco Use    Smoking status: Never    Smokeless tobacco: Never   Substance Use Topics    Alcohol use: Yes     Comment: socially      Current Outpatient Medications   Medication Sig Dispense Refill    azelastine (ASTELIN) 0.1 % nasal spray 2 sprays by Nasal route 2 times daily Use in each nostril as directed 60 mL 0    amLODIPine (NORVASC) 5 MG tablet Take 1 tablet by mouth daily 90 tablet 1    Naproxen Sodium (ALEVE PO) Take 220 mg by mouth 2 times daily      hydroxychloroquine (PLAQUENIL) 200 MG tablet Take 200 mg by mouth 2 times daily       No current facility-administered medications for this visit. No Known Allergies    Health Maintenance   Topic Date Due    HIV screen  Never done    Hepatitis C screen  Never done    Cervical cancer screen  Never done    Shingles vaccine (1 of 2) Never done    Flu vaccine (1) Never done    Breast cancer screen  02/11/2023    DTaP/Tdap/Td vaccine (1 - Tdap) 03/15/2023 (Originally 11/15/1979)    COVID-19 Vaccine (2 - [place] [place] series) 03/15/2023 (Originally 10/28/2021)    Depression Screen  03/15/2023    Lipids  11/30/2026    Colorectal Cancer Screen  04/26/2032    Hepatitis A vaccine  Aged Out    Hib vaccine  Aged Out    Meningococcal (ACWY) vaccine  Aged Out    Pneumococcal 0-64 years Vaccine  Aged Out       :     Review of Systems   Constitutional:  Negative for chills, fatigue and fever. HENT:  Negative for ear discharge, ear pain, sinus pressure, sinus pain, sore throat and trouble swallowing. Eyes:  Negative for discharge, redness and itching. Respiratory:  Negative for cough, chest tightness, shortness of breath and wheezing. Cardiovascular:  Negative for chest pain. Gastrointestinal:  Negative for abdominal pain, diarrhea, nausea and vomiting. Genitourinary:  Negative for difficulty urinating. Musculoskeletal:  Negative for arthralgias and neck pain. Skin:  Negative for rash. Neurological:  Negative for dizziness, weakness, light-headedness and headaches. All other systems reviewed and are negative. Objective:     Physical Exam  Constitutional:       Appearance: Normal appearance. She is normal weight. HENT:      Head: Normocephalic and atraumatic. Nose: Nose normal.   Eyes:      Extraocular Movements: Extraocular movements intact. Conjunctiva/sclera: Conjunctivae normal.      Pupils: Pupils are equal, round, and reactive to light.    Cardiovascular: Rate and Rhythm: Normal rate and regular rhythm. Pulses: Normal pulses. Heart sounds: Normal heart sounds. Pulmonary:      Effort: Pulmonary effort is normal.      Breath sounds: Normal breath sounds. Abdominal:      General: Abdomen is flat. Palpations: Abdomen is soft. Musculoskeletal:         General: Normal range of motion. Cervical back: Neck supple. Skin:     General: Skin is warm and dry. Capillary Refill: Capillary refill takes less than 2 seconds. Neurological:      General: No focal deficit present. Mental Status: She is alert and oriented to person, place, and time. Psychiatric:         Mood and Affect: Mood normal.     /88   Pulse 96   Temp 98.4 °F (36.9 °C)   Resp 16   Wt 206 lb 12.8 oz (93.8 kg)   SpO2 97%   BMI 34.41 kg/m²     Assessment:       Diagnosis Orders   1. Primary hypertension        2. Rheumatoid arthritis involving both hands, unspecified whether rheumatoid factor present ([facility] [address]        3. Screening mammogram for breast cancer  ZO DIGITAL SCREEN UNILATERAL RIGHT      4. Carpal tunnel syndrome of right wrist            :      Return in about 6 months (around 7/18/2023) for physical .  1. Hypertension  Blood pressure stable  Continue with amlodipine 5 mg daily  2. Rheumatoid arthritis  Stable  Patient follows with rheumatology. Currently on Plaquenil 20 mg twice a day  3. Screening for breast cancer  Mammogram ordered  4. Carpal tunnel  Due to have surgery. Reviewed labs. Will obtain EKG  Cleared for surgery pending EKG. Moderate risk due to age    Follow-up in 6 months for physical.  May return sooner if needed    Orders Placed This Encounter   Procedures    ZO DIGITAL SCREEN UNILATERAL RIGHT     Standing Status:   Future     Standing Expiration Date:   3/18/2024     Order Specific Question:   Reason for exam:     Answer:   hx of breast cancer on left side.      Orders Placed This Encounter   Medications    azelastine (ASTELIN) 0.1 % nasal spray     Si sprays by Nasal route 2 times daily Use in each nostril as directed     Dispense:  60 mL     Refill:  0       Patient given educational materials - seepatient instructions. Discussed use, benefit, and side effects of prescribed medications. All patient questions answered. Pt voiced understanding. Reviewed health maintenance. Instructed to continue current medications, diet and exercise. Patient agreedwith treatment plan. Follow up as directed.       Electronically signed by DOV Pena CNP on  12:16 PM

## 2023-01-20 ENCOUNTER — PATIENT MESSAGE (OUTPATIENT)
Dept: PRIMARY CARE CLINIC | Age: 63
End: 2023-01-20

## 2023-01-20 DIAGNOSIS — Z13.29 SCREENING FOR THYROID DISORDER: ICD-10-CM

## 2023-01-20 DIAGNOSIS — E53.8 VITAMIN B 12 DEFICIENCY: ICD-10-CM

## 2023-01-20 DIAGNOSIS — Z13.1 SCREENING FOR DIABETES MELLITUS: ICD-10-CM

## 2023-01-20 DIAGNOSIS — Z13.220 ENCOUNTER FOR LIPID SCREENING FOR CARDIOVASCULAR DISEASE: Primary | ICD-10-CM

## 2023-01-20 DIAGNOSIS — Z13.6 ENCOUNTER FOR LIPID SCREENING FOR CARDIOVASCULAR DISEASE: Primary | ICD-10-CM

## 2023-01-20 DIAGNOSIS — E55.9 VITAMIN D DEFICIENCY: ICD-10-CM

## 2023-01-20 DIAGNOSIS — Z13.0 SCREENING FOR DEFICIENCY ANEMIA: ICD-10-CM

## 2023-01-20 NOTE — TELEPHONE ENCOUNTER
From: Jolynn Orozco  To: Deena Gates  Sent: 1/20/2023 12:11 PM EST  Subject: Labs Dr. Dago Celis, this is Jolynn Orozco . I was just there Wednesday. I had a appointment with Dr. Gia James naturopathic doctor on Thursday for my RA and she wanted me to see if I could get my labs updated. She wanted to see if I could have the same blood work done as I did back in January 2021. and I think she wanted to check my iron level too. ill send you the paper she gave me.

## 2023-01-31 ENCOUNTER — HOSPITAL ENCOUNTER (OUTPATIENT)
Age: 63
Setting detail: SPECIMEN
Discharge: HOME | End: 2023-01-31

## 2023-01-31 DIAGNOSIS — Z13.1 SCREENING FOR DIABETES MELLITUS: ICD-10-CM

## 2023-01-31 DIAGNOSIS — E53.8 VITAMIN B 12 DEFICIENCY: ICD-10-CM

## 2023-01-31 DIAGNOSIS — Z13.0 SCREENING FOR DEFICIENCY ANEMIA: ICD-10-CM

## 2023-01-31 DIAGNOSIS — Z13.220 ENCOUNTER FOR LIPID SCREENING FOR CARDIOVASCULAR DISEASE: ICD-10-CM

## 2023-01-31 DIAGNOSIS — Z13.6 ENCOUNTER FOR LIPID SCREENING FOR CARDIOVASCULAR DISEASE: ICD-10-CM

## 2023-01-31 DIAGNOSIS — E55.9 VITAMIN D DEFICIENCY: ICD-10-CM

## 2023-01-31 DIAGNOSIS — Z13.29 SCREENING FOR THYROID DISORDER: ICD-10-CM

## 2023-01-31 LAB
ALBUMIN SERPL-MCNC: 4.4 G/DL (ref 3.5–5.2)
ALBUMIN/GLOBULIN RATIO: 2 (ref 1–2.5)
ALP BLD-CCNC: 125 U/L (ref 35–104)
ALT SERPL-CCNC: 19 U/L (ref 5–33)
ANION GAP SERPL CALCULATED.3IONS-SCNC: 16 MMOL/L (ref 9–17)
AST SERPL-CCNC: 22 U/L
BILIRUB SERPL-MCNC: 0.6 MG/DL (ref 0.3–1.2)
BUN BLDV-MCNC: 8 MG/DL (ref 8–23)
CALCIUM SERPL-MCNC: 9.5 MG/DL (ref 8.6–10.4)
CHLORIDE BLD-SCNC: 104 MMOL/L (ref 98–107)
CHOLESTEROL/HDL RATIO: 1.9
CHOLESTEROL: 157 MG/DL
CO2: 25 MMOL/L (ref 20–31)
CREAT SERPL-MCNC: 0.54 MG/DL (ref 0.5–0.9)
ESTIMATED AVERAGE GLUCOSE: 100 MG/DL
FERRITIN: 167 NG/ML (ref 13–150)
FOLATE: >20 NG/ML
GFR SERPL CREATININE-BSD FRML MDRD: >60 ML/MIN/1.73M2
GLUCOSE BLD-MCNC: 82 MG/DL (ref 70–99)
HBA1C MFR BLD: 5.1 % (ref 4–6)
HDLC SERPL-MCNC: 83 MG/DL
IRON SATURATION: 28 % (ref 20–55)
IRON: 90 UG/DL (ref 37–145)
LDL CHOLESTEROL: 58 MG/DL (ref 0–130)
POTASSIUM SERPL-SCNC: 4.1 MMOL/L (ref 3.7–5.3)
SODIUM BLD-SCNC: 145 MMOL/L (ref 135–144)
TOTAL IRON BINDING CAPACITY: 326 UG/DL (ref 250–450)
TOTAL PROTEIN: 6.6 G/DL (ref 6.4–8.3)
TRIGL SERPL-MCNC: 80 MG/DL
TSH SERPL DL<=0.05 MIU/L-ACNC: 1.58 UIU/ML (ref 0.3–5)
UNSATURATED IRON BINDING CAPACITY: 236 UG/DL (ref 112–347)
VITAMIN B-12: 1891 PG/ML (ref 232–1245)
VITAMIN D 25-HYDROXY: 52.9 NG/ML

## 2023-03-08 ENCOUNTER — OFFICE VISIT (OUTPATIENT)
Dept: PRIMARY CARE CLINIC | Age: 63
End: 2023-03-08
Payer: COMMERCIAL

## 2023-03-08 VITALS
TEMPERATURE: 97.6 F | HEART RATE: 96 BPM | RESPIRATION RATE: 14 BRPM | BODY MASS INDEX: 34.45 KG/M2 | OXYGEN SATURATION: 95 % | DIASTOLIC BLOOD PRESSURE: 88 MMHG | WEIGHT: 207 LBS | SYSTOLIC BLOOD PRESSURE: 120 MMHG

## 2023-03-08 DIAGNOSIS — I10 PRIMARY HYPERTENSION: ICD-10-CM

## 2023-03-08 DIAGNOSIS — H66.001 NON-RECURRENT ACUTE SUPPURATIVE OTITIS MEDIA OF RIGHT EAR WITHOUT SPONTANEOUS RUPTURE OF TYMPANIC MEMBRANE: Primary | ICD-10-CM

## 2023-03-08 PROCEDURE — 99214 OFFICE O/P EST MOD 30 MIN: CPT | Performed by: NURSE PRACTITIONER

## 2023-03-08 PROCEDURE — 3074F SYST BP LT 130 MM HG: CPT | Performed by: NURSE PRACTITIONER

## 2023-03-08 PROCEDURE — 1036F TOBACCO NON-USER: CPT | Performed by: NURSE PRACTITIONER

## 2023-03-08 PROCEDURE — G8417 CALC BMI ABV UP PARAM F/U: HCPCS | Performed by: NURSE PRACTITIONER

## 2023-03-08 PROCEDURE — G8484 FLU IMMUNIZE NO ADMIN: HCPCS | Performed by: NURSE PRACTITIONER

## 2023-03-08 PROCEDURE — 3017F COLORECTAL CA SCREEN DOC REV: CPT | Performed by: NURSE PRACTITIONER

## 2023-03-08 PROCEDURE — 3079F DIAST BP 80-89 MM HG: CPT | Performed by: NURSE PRACTITIONER

## 2023-03-08 PROCEDURE — G8427 DOCREV CUR MEDS BY ELIG CLIN: HCPCS | Performed by: NURSE PRACTITIONER

## 2023-03-08 RX ORDER — FLUTICASONE PROPIONATE 50 MCG
2 SPRAY, SUSPENSION (ML) NASAL DAILY
Qty: 16 G | Refills: 0 | Status: SHIPPED | OUTPATIENT
Start: 2023-03-08

## 2023-03-08 RX ORDER — AMOXICILLIN 875 MG/1
875 TABLET, COATED ORAL 2 TIMES DAILY
Qty: 20 TABLET | Refills: 0 | Status: SHIPPED | OUTPATIENT
Start: 2023-03-08 | End: 2023-03-18

## 2023-03-08 SDOH — ECONOMIC STABILITY: FOOD INSECURITY: WITHIN THE PAST 12 MONTHS, YOU WORRIED THAT YOUR FOOD WOULD RUN OUT BEFORE YOU GOT MONEY TO BUY MORE.: NEVER TRUE

## 2023-03-08 SDOH — ECONOMIC STABILITY: FOOD INSECURITY: WITHIN THE PAST 12 MONTHS, THE FOOD YOU BOUGHT JUST DIDN'T LAST AND YOU DIDN'T HAVE MONEY TO GET MORE.: NEVER TRUE

## 2023-03-08 SDOH — ECONOMIC STABILITY: HOUSING INSECURITY
IN THE LAST 12 MONTHS, WAS THERE A TIME WHEN YOU DID NOT HAVE A STEADY PLACE TO SLEEP OR SLEPT IN A SHELTER (INCLUDING NOW)?: NO

## 2023-03-08 SDOH — ECONOMIC STABILITY: INCOME INSECURITY: HOW HARD IS IT FOR YOU TO PAY FOR THE VERY BASICS LIKE FOOD, HOUSING, MEDICAL CARE, AND HEATING?: NOT HARD AT ALL

## 2023-03-08 ASSESSMENT — ENCOUNTER SYMPTOMS
NAUSEA: 0
SINUS PAIN: 0
SHORTNESS OF BREATH: 0
DIARRHEA: 0
EYE ITCHING: 0
CHEST TIGHTNESS: 0
COUGH: 1
WHEEZING: 0
TROUBLE SWALLOWING: 0
ABDOMINAL PAIN: 0
VOMITING: 0
EYE REDNESS: 0
SORE THROAT: 0
EYE DISCHARGE: 0
SINUS PRESSURE: 0

## 2023-03-08 NOTE — PROGRESS NOTES
[address] [facility] PRIMARY CARE  [facility] [address] [facility] [address]  [address] [address]  Dept: [phone]  Dept Fax: [phone]    Kassie Diaz is a 63 y.o. female who presents today for her medical conditions/complaintsas noted below. Kassie Diaz is c/o of Ear Fullness (R ear, x2 weeks, gets a sharp pain once in a while), Cough, and Neck Pain (Soreness started around the same time as the ear fullness)        HPI:     Patient presents for same-day appointment  Blood pressure stable  Weight is stable    Patient presents for same-day appoint with complaints of ear fullness and a cough  She is having right ear fullness for about 2 weeks. Sharp pain at times. C/o a humming noise as well. Mild cough. Small amount of mucous. No sinus pressure. Tickle in throat. Neck started to feel sore around the same time. On and off since then. It does go down into her right shoulder.      No other concerns       Past Medical History:   Diagnosis Date    Cancer ([facility] [address]     Breast:  left mastectomy, chemo radiation    Hypertension     Lymphedema     left arm    RA (rheumatoid arthritis) (HCC)     Trigger finger       Past Surgical History:   Procedure Laterality Date    BREAST SURGERY      COLONOSCOPY N/A 2022     COLORECTAL CANCER SCREENING, NOT HIGH RISK (N/A )    COLONOSCOPY N/A 2022    COLORECTAL CANCER SCREENING, NOT HIGH RISK performed by Raquel Maki MD at [address] [place] [address]         Family History   Problem Relation Age of Onset    High Blood Pressure Mother     Cancer Father         liver, lung and prostate    Other Sister         MS    Colon Cancer Maternal Grandmother        Social History     Tobacco Use    Smoking status: Never    Smokeless tobacco: Never   Substance Use Topics    Alcohol use: Yes     Comment: socially      Current Outpatient Medications   Medication Sig Dispense Refill    amoxicillin (AMOXIL) 875 MG tablet Take 1 tablet by mouth 2 times daily for 10 days 20 tablet 0    fluticasone (FLONASE) 50 MCG/ACT nasal spray 2 sprays by Each Nostril route daily 16 g 0    amLODIPine (NORVASC) 5 MG tablet Take 1 tablet by mouth daily 90 tablet 1    Naproxen Sodium (ALEVE PO) Take 220 mg by mouth 2 times daily      hydroxychloroquine (PLAQUENIL) 200 MG tablet Take 200 mg by mouth 2 times daily       No current facility-administered medications for this visit. No Known Allergies    Health Maintenance   Topic Date Due    HIV screen  Never done    Hepatitis C screen  Never done    Cervical cancer screen  Never done    Shingles vaccine (1 of 2) Never done    Flu vaccine (1) Never done    Breast cancer screen  02/11/2023    DTaP/Tdap/Td vaccine (1 - Tdap) 03/15/2023 (Originally 11/15/1981)    COVID-19 Vaccine (2 - [place] series) 03/15/2023 (Originally 10/28/2021)    Depression Screen  01/18/2024    Lipids  01/31/2028    Colorectal Cancer Screen  04/26/2032    Hepatitis A vaccine  Aged Out    Hib vaccine  Aged Out    Meningococcal (ACWY) vaccine  Aged Out    Pneumococcal 0-64 years Vaccine  Aged Out       :     Review of Systems   Constitutional:  Negative for chills, fatigue and fever. HENT:  Positive for ear pain and postnasal drip. Negative for ear discharge, sinus pressure, sinus pain, sore throat and trouble swallowing. Eyes:  Negative for discharge, redness and itching. Respiratory:  Positive for cough. Negative for chest tightness, shortness of breath and wheezing. Cardiovascular:  Negative for chest pain. Gastrointestinal:  Negative for abdominal pain, diarrhea, nausea and vomiting. Genitourinary:  Negative for difficulty urinating. Musculoskeletal:  Negative for arthralgias and neck pain. Skin:  Negative for rash. Neurological:  Negative for dizziness, weakness, light-headedness and headaches. All other systems reviewed and are negative. Objective:     Physical Exam  Constitutional:       Appearance: Normal appearance.  She is normal weight. HENT:      Head: Normocephalic and atraumatic. Right Ear: A middle ear effusion (purulent fluid) is present. Tympanic membrane is not erythematous or bulging. Left Ear: A middle ear effusion (clear) is present. Tympanic membrane is not erythematous or bulging. Nose: Nose normal.      Right Sinus: No maxillary sinus tenderness or frontal sinus tenderness. Left Sinus: No maxillary sinus tenderness or frontal sinus tenderness. Eyes:      Extraocular Movements: Extraocular movements intact. Conjunctiva/sclera: Conjunctivae normal.      Pupils: Pupils are equal, round, and reactive to light. Cardiovascular:      Rate and Rhythm: Normal rate and regular rhythm. Pulses: Normal pulses. Heart sounds: Normal heart sounds. Pulmonary:      Effort: Pulmonary effort is normal.      Breath sounds: Normal breath sounds. Abdominal:      General: Abdomen is flat. Palpations: Abdomen is soft. Musculoskeletal:         General: Normal range of motion. Cervical back: Neck supple. Skin:     General: Skin is warm and dry. Capillary Refill: Capillary refill takes less than 2 seconds. Neurological:      General: No focal deficit present. Mental Status: She is alert and oriented to person, place, and time. Psychiatric:         Mood and Affect: Mood normal.     /88   Pulse 96   Temp 97.6 °F (36.4 °C) (Oral)   Resp 14   Wt 207 lb (93.9 kg)   SpO2 95%   BMI 34.45 kg/m²     Assessment:       Diagnosis Orders   1. Non-recurrent acute suppurative otitis media of right ear without spontaneous rupture of tympanic membrane        2. Primary hypertension            :      Return if symptoms worsen or fail to improve. 1.  Otitis media  Rx for amoxicillin 875 mg twice daily x10 days  Rx for Flonase 2 sprays each nostril daily  Discussed nasal saline rinses  2.   Hypertension  Blood pressure stable  Continue with amlodipine 5 mg daily    Patient is going to follow-up as scheduled. He may return sooner as needed  Orders Placed This Encounter   Medications    amoxicillin (AMOXIL) 875 MG tablet     Sig: Take 1 tablet by mouth 2 times daily for 10 days     Dispense:  20 tablet     Refill:  0    fluticasone (FLONASE) 50 MCG/ACT nasal spray     Si sprays by Each Nostril route daily     Dispense:  16 g     Refill:  0       Patient given educational materials - seepatient instructions. Discussed use, benefit, and side effects of prescribed medications. All patient questions answered. Pt voiced understanding. Reviewed health maintenance. Instructed to continue current medications, diet and exercise. Patient agreedwith treatment plan. Follow up as directed.       Electronically signed by DOV Call CNP on 3/8/2023at 8:38 AM

## 2023-03-20 ENCOUNTER — TELEPHONE (OUTPATIENT)
Dept: PRIMARY CARE CLINIC | Age: 63
End: 2023-03-20

## 2023-03-20 NOTE — TELEPHONE ENCOUNTER
----- Message from Latanya Schmidt sent at 3/20/2023 11:29 AM EDT -----  Subject: Message to Provider    QUESTIONS  Information for Provider? patient is still having her ears are clogged she   has finished her meds and does not know if she should have another appt. or new meds please advise pt.  ---------------------------------------------------------------------------  --------------  Austin Campos [facility]  [phone]; OK to leave message on voicemail  ---------------------------------------------------------------------------  --------------  SCRIPT ANSWERS  Relationship to Patient?  Self

## 2023-03-23 ENCOUNTER — OFFICE VISIT (OUTPATIENT)
Dept: PRIMARY CARE CLINIC | Age: 63
End: 2023-03-23
Payer: COMMERCIAL

## 2023-03-23 VITALS
WEIGHT: 202.8 LBS | HEART RATE: 108 BPM | BODY MASS INDEX: 33.79 KG/M2 | DIASTOLIC BLOOD PRESSURE: 80 MMHG | HEIGHT: 65 IN | OXYGEN SATURATION: 96 % | SYSTOLIC BLOOD PRESSURE: 120 MMHG

## 2023-03-23 DIAGNOSIS — H93.8X1 EAR FULLNESS, RIGHT: ICD-10-CM

## 2023-03-23 DIAGNOSIS — H91.91 DECREASED HEARING, RIGHT: ICD-10-CM

## 2023-03-23 DIAGNOSIS — H66.011 NON-RECURRENT ACUTE SUPPURATIVE OTITIS MEDIA OF RIGHT EAR WITH SPONTANEOUS RUPTURE OF TYMPANIC MEMBRANE: Primary | ICD-10-CM

## 2023-03-23 PROCEDURE — 99213 OFFICE O/P EST LOW 20 MIN: CPT | Performed by: NURSE PRACTITIONER

## 2023-03-23 PROCEDURE — G8427 DOCREV CUR MEDS BY ELIG CLIN: HCPCS | Performed by: NURSE PRACTITIONER

## 2023-03-23 PROCEDURE — 3017F COLORECTAL CA SCREEN DOC REV: CPT | Performed by: NURSE PRACTITIONER

## 2023-03-23 PROCEDURE — 1036F TOBACCO NON-USER: CPT | Performed by: NURSE PRACTITIONER

## 2023-03-23 PROCEDURE — G8417 CALC BMI ABV UP PARAM F/U: HCPCS | Performed by: NURSE PRACTITIONER

## 2023-03-23 PROCEDURE — G8484 FLU IMMUNIZE NO ADMIN: HCPCS | Performed by: NURSE PRACTITIONER

## 2023-03-23 PROCEDURE — 3074F SYST BP LT 130 MM HG: CPT | Performed by: NURSE PRACTITIONER

## 2023-03-23 PROCEDURE — 3079F DIAST BP 80-89 MM HG: CPT | Performed by: NURSE PRACTITIONER

## 2023-03-23 RX ORDER — CIPROFLOXACIN AND DEXAMETHASONE 3; 1 MG/ML; MG/ML
4 SUSPENSION/ DROPS AURICULAR (OTIC) 2 TIMES DAILY
Qty: 7.5 ML | Refills: 0 | Status: SHIPPED | OUTPATIENT
Start: 2023-03-23

## 2023-03-23 SDOH — ECONOMIC STABILITY: INCOME INSECURITY: HOW HARD IS IT FOR YOU TO PAY FOR THE VERY BASICS LIKE FOOD, HOUSING, MEDICAL CARE, AND HEATING?: NOT HARD AT ALL

## 2023-03-23 SDOH — ECONOMIC STABILITY: FOOD INSECURITY: WITHIN THE PAST 12 MONTHS, YOU WORRIED THAT YOUR FOOD WOULD RUN OUT BEFORE YOU GOT MONEY TO BUY MORE.: NEVER TRUE

## 2023-03-23 SDOH — ECONOMIC STABILITY: FOOD INSECURITY: WITHIN THE PAST 12 MONTHS, THE FOOD YOU BOUGHT JUST DIDN'T LAST AND YOU DIDN'T HAVE MONEY TO GET MORE.: NEVER TRUE

## 2023-03-23 ASSESSMENT — PATIENT HEALTH QUESTIONNAIRE - PHQ9
2. FEELING DOWN, DEPRESSED OR HOPELESS: 0
SUM OF ALL RESPONSES TO PHQ QUESTIONS 1-9: 0
SUM OF ALL RESPONSES TO PHQ QUESTIONS 1-9: 0
1. LITTLE INTEREST OR PLEASURE IN DOING THINGS: 0
SUM OF ALL RESPONSES TO PHQ QUESTIONS 1-9: 0
SUM OF ALL RESPONSES TO PHQ9 QUESTIONS 1 & 2: 0
SUM OF ALL RESPONSES TO PHQ QUESTIONS 1-9: 0

## 2023-03-23 ASSESSMENT — ENCOUNTER SYMPTOMS
SINUS PRESSURE: 0
TROUBLE SWALLOWING: 0
SHORTNESS OF BREATH: 0
WHEEZING: 0
CONSTIPATION: 0
BLOOD IN STOOL: 0
COUGH: 0
VOMITING: 0
SORE THROAT: 0
NAUSEA: 0
DIARRHEA: 0
ABDOMINAL PAIN: 0

## 2023-03-23 NOTE — PROGRESS NOTES
Cancer Maternal Grandmother           Social History     Tobacco Use    Smoking status: Never    Smokeless tobacco: Never   Substance Use Topics    Alcohol use: Yes     Comment: socially      Current Outpatient Medications   Medication Sig Dispense Refill    ciprofloxacin-dexamethasone (CIPRODEX) 0.3-0.1 % otic suspension Place 4 drops into the right ear 2 times daily 7.5 mL 0    fluticasone (FLONASE) 50 MCG/ACT nasal spray 2 sprays by Each Nostril route daily 16 g 0    amLODIPine (NORVASC) 5 MG tablet Take 1 tablet by mouth daily 90 tablet 1    Naproxen Sodium (ALEVE PO) Take 220 mg by mouth 2 times daily      hydroxychloroquine (PLAQUENIL) 200 MG tablet Take 200 mg by mouth 2 times daily       No current facility-administered medications for this visit. No Known Allergies    Health Maintenance   Topic Date Due    HIV screen  Never done    Hepatitis C screen  Never done    DTaP/Tdap/Td vaccine (1 - Tdap) Never done    Cervical cancer screen  Never done    Shingles vaccine (1 of 2) Never done    COVID-19 Vaccine (2 - Moderna series) 10/28/2021    Flu vaccine (1) Never done    Breast cancer screen  02/11/2023    Depression Screen  03/23/2024    Lipids  01/31/2028    Colorectal Cancer Screen  04/26/2032    Hepatitis A vaccine  Aged Out    Hib vaccine  Aged Out    Meningococcal (ACWY) vaccine  Aged Out    Pneumococcal 0-64 years Vaccine  Aged Out       Subjective:      Review of Systems   Constitutional:  Negative for activity change, appetite change, chills, fatigue, fever and unexpected weight change. HENT:  Positive for hearing loss (decreased on right). Negative for congestion, ear pain, sinus pressure, sore throat and trouble swallowing. Right ear fullness with hearing loss   Eyes:  Negative for visual disturbance. Respiratory:  Negative for cough, shortness of breath and wheezing. Cardiovascular:  Negative for chest pain, palpitations and leg swelling.    Gastrointestinal:  Negative for

## 2023-04-20 ENCOUNTER — HOSPITAL ENCOUNTER (OUTPATIENT)
Age: 63
Setting detail: SPECIMEN
Discharge: HOME | End: 2023-04-20

## 2023-04-20 LAB
CRP SERPL HS-MCNC: 32.5 MG/L (ref 0–5)
ERYTHROCYTE [SEDIMENTATION RATE] IN BLOOD BY WESTERGREN METHOD: 6 MM/HR (ref 0–30)

## 2023-05-09 RX ORDER — AMLODIPINE BESYLATE 5 MG/1
5 TABLET ORAL DAILY
Qty: 90 TABLET | Refills: 1 | Status: SHIPPED | OUTPATIENT
Start: 2023-05-09

## 2023-05-15 ENCOUNTER — PATIENT MESSAGE (OUTPATIENT)
Dept: PRIMARY CARE CLINIC | Age: 63
End: 2023-05-15

## 2023-05-15 DIAGNOSIS — R79.89 ELEVATED LFTS: Primary | ICD-10-CM

## 2023-05-15 RX ORDER — NALTREXONE HYDROCHLORIDE 50 MG/1
50 TABLET, FILM COATED ORAL DAILY
Qty: 30 TABLET | Refills: 1 | Status: SHIPPED | OUTPATIENT
Start: 2023-05-15

## 2023-05-15 NOTE — TELEPHONE ENCOUNTER
From: Cami Teixeira  To: Judy Tejada  Sent: 5/15/2023 8:54 AM EDT  Subject: LDN    Hi Jaiden Lopez, I was wondering if you could write a script for me for LDN-Low Dose Naltrexone? I said something to my rheumatologist and he said I could get it from my primary doctor, a lot of people with RA take it for inflammation . If you want me to schedule an appointment to see you, I can do that.  Thanks, Cami Teixeira

## 2023-06-20 ENCOUNTER — TELEPHONE (OUTPATIENT)
Dept: PRIMARY CARE CLINIC | Age: 63
End: 2023-06-20

## 2023-06-20 NOTE — TELEPHONE ENCOUNTER
Received fax from [facility] regarding a LMN to be signed by PCP for pt's Lymphapress compression pump    Called pt to verify that they are going through this company for pump. Pt stated that she is going through this company and trying to get it approved by insurance. Writer informed pt that forms will be signed and faxed back to [facility].  Pt verbalized understanding

## 2023-07-14 ENCOUNTER — HOSPITAL ENCOUNTER (OUTPATIENT)
Dept: MAMMOGRAPHY | Age: 63
Discharge: HOME | End: 2023-07-14
Payer: COMMERCIAL

## 2023-07-14 DIAGNOSIS — Z12.31 ENCOUNTER FOR SCREENING MAMMOGRAM FOR MALIGNANT NEOPLASM OF BREAST: ICD-10-CM

## 2023-07-14 PROCEDURE — 77063 BREAST TOMOSYNTHESIS BI: CPT

## 2023-07-15 DIAGNOSIS — R92.8 ABNORMAL MAMMOGRAM: Primary | ICD-10-CM

## 2023-07-17 ENCOUNTER — TELEPHONE (OUTPATIENT)
Dept: PRIMARY CARE CLINIC | Age: 63
End: 2023-07-17

## 2023-07-17 DIAGNOSIS — R92.8 ABNORMAL MAMMOGRAM: Primary | ICD-10-CM

## 2023-07-17 NOTE — TELEPHONE ENCOUNTER
Patient calling in office states she cannot schedule her US for her breast as central scheduling told her the order was wrong. Patient states she is unsure what order is supposed to be. Patient's last result note of mammogram stated she needed an axillary US of lymph node but order in epic states US of extremity is what was placed.  Please advise

## 2023-07-17 NOTE — TELEPHONE ENCOUNTER
Call from [address] order for the patient needs to be changed.  Since it is due to abnormal mammogram it needs to be US right breast.

## 2023-07-18 ENCOUNTER — HOSPITAL ENCOUNTER (OUTPATIENT)
Dept: ULTRASOUND IMAGING | Age: 63
Discharge: HOME | End: 2023-07-20
Payer: COMMERCIAL

## 2023-07-18 DIAGNOSIS — R92.8 ABNORMAL MAMMOGRAM: Primary | ICD-10-CM

## 2023-07-18 DIAGNOSIS — R92.8 ABNORMAL MAMMOGRAM: ICD-10-CM

## 2023-07-18 PROCEDURE — 76642 ULTRASOUND BREAST LIMITED: CPT

## 2023-07-19 DIAGNOSIS — R92.8 ABNORMAL MAMMOGRAM: Primary | ICD-10-CM

## 2023-07-31 ENCOUNTER — HOSPITAL ENCOUNTER (OUTPATIENT)
Dept: ULTRASOUND IMAGING | Age: 63
Discharge: HOME | End: 2023-08-02
Payer: COMMERCIAL

## 2023-07-31 ENCOUNTER — HOSPITAL ENCOUNTER (OUTPATIENT)
Dept: MAMMOGRAPHY | Age: 63
Discharge: HOME | End: 2023-08-02
Payer: COMMERCIAL

## 2023-07-31 DIAGNOSIS — Z98.890 STATUS POST BREAST BIOPSY: ICD-10-CM

## 2023-07-31 DIAGNOSIS — R92.8 ABNORMAL MAMMOGRAM: ICD-10-CM

## 2023-07-31 PROCEDURE — 77065 DX MAMMO INCL CAD UNI: CPT

## 2023-07-31 PROCEDURE — 38505 NEEDLE BIOPSY LYMPH NODES: CPT

## 2023-07-31 PROCEDURE — 2500000003 HC RX 250 WO HCPCS

## 2023-07-31 PROCEDURE — 88341 IMHCHEM/IMCYTCHM EA ADD ANTB: CPT

## 2023-07-31 PROCEDURE — 88305 TISSUE EXAM BY PATHOLOGIST: CPT

## 2023-07-31 PROCEDURE — 88342 IMHCHEM/IMCYTCHM 1ST ANTB: CPT

## 2023-08-04 LAB — SURGICAL PATHOLOGY REPORT: NORMAL

## 2023-09-27 ENCOUNTER — HOSPITAL ENCOUNTER (OUTPATIENT)
Age: 63
Setting detail: SPECIMEN
Discharge: HOME | End: 2023-09-27

## 2023-09-27 LAB
ALBUMIN SERPL-MCNC: 4.1 G/DL (ref 3.5–5.2)
ALP SERPL-CCNC: 103 U/L (ref 35–104)
ALT SERPL-CCNC: 19 U/L (ref 5–33)
AST SERPL-CCNC: 26 U/L
CREAT SERPL-MCNC: 0.6 MG/DL (ref 0.5–0.9)
CRP SERPL HS-MCNC: 11.3 MG/L
ERYTHROCYTE [DISTWIDTH] IN BLOOD BY AUTOMATED COUNT: 13.7 % (ref 11.8–14.4)
ERYTHROCYTE [SEDIMENTATION RATE] IN BLOOD BY PHOTOMETRIC METHOD: 23 MM/HR (ref 0–30)
GFR SERPL CREATININE-BSD FRML MDRD: >60 ML/MIN/1.73M2
HCT VFR BLD AUTO: 42.8 % (ref 36.3–47.1)
HGB BLD-MCNC: 13 G/DL (ref 11.9–15.1)
MCH RBC QN AUTO: 28.5 PG (ref 25.2–33.5)
MCHC RBC AUTO-ENTMCNC: 30.4 G/DL (ref 28.4–34.8)
MCV RBC AUTO: 93.9 FL (ref 82.6–102.9)
NRBC BLD-RTO: 0 PER 100 WBC
PLATELET # BLD AUTO: 251 K/UL (ref 138–453)
PMV BLD AUTO: 10.7 FL (ref 8.1–13.5)
RBC # BLD AUTO: 4.56 M/UL (ref 3.95–5.11)
WBC OTHER # BLD: 6.5 K/UL (ref 3.5–11.3)

## 2023-10-23 ENCOUNTER — OFFICE VISIT (OUTPATIENT)
Dept: PRIMARY CARE CLINIC | Age: 63
End: 2023-10-23
Payer: COMMERCIAL

## 2023-10-23 VITALS
BODY MASS INDEX: 33.22 KG/M2 | RESPIRATION RATE: 16 BRPM | OXYGEN SATURATION: 96 % | HEART RATE: 89 BPM | SYSTOLIC BLOOD PRESSURE: 128 MMHG | WEIGHT: 199.6 LBS | DIASTOLIC BLOOD PRESSURE: 88 MMHG

## 2023-10-23 DIAGNOSIS — I10 PRIMARY HYPERTENSION: Primary | ICD-10-CM

## 2023-10-23 DIAGNOSIS — I89.0 LYMPHEDEMA OF LEFT ARM: ICD-10-CM

## 2023-10-23 DIAGNOSIS — M85.80 OSTEOPENIA, UNSPECIFIED LOCATION: ICD-10-CM

## 2023-10-23 DIAGNOSIS — M25.532 LEFT WRIST PAIN: ICD-10-CM

## 2023-10-23 DIAGNOSIS — R79.89 ELEVATED FERRITIN: ICD-10-CM

## 2023-10-23 DIAGNOSIS — H93.19 TINNITUS, UNSPECIFIED LATERALITY: ICD-10-CM

## 2023-10-23 DIAGNOSIS — R79.89 ELEVATED LFTS: ICD-10-CM

## 2023-10-23 PROCEDURE — G8417 CALC BMI ABV UP PARAM F/U: HCPCS | Performed by: NURSE PRACTITIONER

## 2023-10-23 PROCEDURE — 3079F DIAST BP 80-89 MM HG: CPT | Performed by: NURSE PRACTITIONER

## 2023-10-23 PROCEDURE — 99214 OFFICE O/P EST MOD 30 MIN: CPT | Performed by: NURSE PRACTITIONER

## 2023-10-23 PROCEDURE — 3017F COLORECTAL CA SCREEN DOC REV: CPT | Performed by: NURSE PRACTITIONER

## 2023-10-23 PROCEDURE — 1036F TOBACCO NON-USER: CPT | Performed by: NURSE PRACTITIONER

## 2023-10-23 PROCEDURE — 3074F SYST BP LT 130 MM HG: CPT | Performed by: NURSE PRACTITIONER

## 2023-10-23 PROCEDURE — G8484 FLU IMMUNIZE NO ADMIN: HCPCS | Performed by: NURSE PRACTITIONER

## 2023-10-23 PROCEDURE — G8427 DOCREV CUR MEDS BY ELIG CLIN: HCPCS | Performed by: NURSE PRACTITIONER

## 2023-10-23 RX ORDER — SULFASALAZINE 500 MG/1
500 TABLET ORAL 4 TIMES DAILY
COMMUNITY

## 2023-10-23 RX ORDER — LISINOPRIL 5 MG/1
5 TABLET ORAL DAILY
Qty: 90 TABLET | Refills: 1 | Status: SHIPPED | OUTPATIENT
Start: 2023-10-23

## 2023-10-23 ASSESSMENT — PATIENT HEALTH QUESTIONNAIRE - PHQ9
2. FEELING DOWN, DEPRESSED OR HOPELESS: 0
SUM OF ALL RESPONSES TO PHQ9 QUESTIONS 1 & 2: 0
SUM OF ALL RESPONSES TO PHQ QUESTIONS 1-9: 0
1. LITTLE INTEREST OR PLEASURE IN DOING THINGS: 0
SUM OF ALL RESPONSES TO PHQ QUESTIONS 1-9: 0

## 2023-10-23 ASSESSMENT — ENCOUNTER SYMPTOMS
CHEST TIGHTNESS: 0
DIARRHEA: 0
SHORTNESS OF BREATH: 0
ABDOMINAL PAIN: 0
EYE REDNESS: 0
SINUS PAIN: 0
WHEEZING: 0
COUGH: 0
EYE ITCHING: 0
TROUBLE SWALLOWING: 0
SORE THROAT: 0
SINUS PRESSURE: 0
VOMITING: 0
EYE DISCHARGE: 0
NAUSEA: 0

## 2023-10-23 NOTE — PROGRESS NOTES
Onset    High Blood Pressure Mother     Cancer Father         liver, lung and prostate    Other Sister         MS    Colon Cancer Maternal Grandmother        Social History     Tobacco Use    Smoking status: Never    Smokeless tobacco: Never   Substance Use Topics    Alcohol use: Yes     Comment: socially      Current Outpatient Medications   Medication Sig Dispense Refill    sulfaSALAzine (AZULFIDINE) 500 MG tablet Take 1 tablet by mouth 4 times daily      lisinopril (PRINIVIL;ZESTRIL) 5 MG tablet Take 1 tablet by mouth daily 90 tablet 1    Naproxen Sodium (ALEVE PO) Take 220 mg by mouth 2 times daily      hydroxychloroquine (PLAQUENIL) 200 MG tablet Take 1 tablet by mouth 2 times daily       No current facility-administered medications for this visit. No Known Allergies    Health Maintenance   Topic Date Due    HIV screen  Never done    Hepatitis C screen  Never done    DTaP/Tdap/Td vaccine (1 - Tdap) Never done    Cervical cancer screen  Never done    Shingles vaccine (1 of 2) Never done    COVID-19 Vaccine (2 - Moderna series) 11/25/2021    Flu vaccine (1) Never done    Depression Screen  03/23/2024    Breast cancer screen  07/14/2024    Lipids  01/31/2028    Colorectal Cancer Screen  04/26/2032    Hepatitis A vaccine  Aged Out    Hepatitis B vaccine  Aged Out    Hib vaccine  Aged Out    Meningococcal (ACWY) vaccine  Aged Out    Pneumococcal 0-64 years Vaccine  Aged Out    Diabetes screen  Discontinued       :     Review of Systems   Constitutional:  Negative for chills, fatigue and fever. HENT:  Negative for ear discharge, ear pain, sinus pressure, sinus pain, sore throat and trouble swallowing. Eyes:  Negative for discharge, redness and itching. Respiratory:  Negative for cough, chest tightness, shortness of breath and wheezing. Cardiovascular:  Negative for chest pain. Gastrointestinal:  Negative for abdominal pain, diarrhea, nausea and vomiting.    Genitourinary:  Negative for difficulty

## 2023-10-30 ENCOUNTER — HOSPITAL ENCOUNTER (OUTPATIENT)
Age: 63
Discharge: HOME | End: 2023-11-01
Payer: COMMERCIAL

## 2023-10-30 ENCOUNTER — HOSPITAL ENCOUNTER (OUTPATIENT)
Dept: GENERAL RADIOLOGY | Age: 63
Discharge: HOME | End: 2023-11-01
Payer: COMMERCIAL

## 2023-10-30 DIAGNOSIS — M25.532 LEFT WRIST PAIN: ICD-10-CM

## 2023-10-30 PROCEDURE — 73110 X-RAY EXAM OF WRIST: CPT

## 2023-11-03 ENCOUNTER — HOSPITAL ENCOUNTER (OUTPATIENT)
Dept: OCCUPATIONAL THERAPY | Age: 63
Setting detail: THERAPIES SERIES
Discharge: HOME | End: 2023-11-03
Payer: COMMERCIAL

## 2023-11-03 PROCEDURE — 97166 OT EVAL MOD COMPLEX 45 MIN: CPT

## 2023-11-03 NOTE — FLOWSHEET NOTE
TREATMENT LOCATION:   [] [facility]  Outpatient Rehabilitation &  Therapy  [address] [address], [address]  P: [phone]  F: [phone] [x] [facility]   Outpatient Rehabilitation &  Therapy  [address]  P: [phone]  F: [phone] [] [address]   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  (Certification Dates: 11/3/2023 - 2/3/2023)    Demographic info for garment/pump VOB:   [address] [place] [address] [address]  [phone]  [email or web address]  11/3/2023 [employer] Benefit Check      Allergies:  No Known Allergies  Medications: See charted information in Epic    Past Medical History: See charted information in [facility]  Active Ambulatory Problems     Diagnosis Date Noted    Malignant neoplasm of breast ([address]) 10/11/2019    Anxiety 2018    Hypertension 2016    Lymphadenopathy 2018    Lymphedema of left arm 2021    Malignant neoplasm of upper-outer quadrant of right female breast ([address]) 2020    Adiposity 2018    Vitamin D deficiency, unspecified 2020    Rheumatoid arthritis involving both hands ([address]) 2021    Chronic pain of both knees 2021    Diverticulosis     Hemorrhoids      Resolved Ambulatory

## 2023-11-10 ENCOUNTER — HOSPITAL ENCOUNTER (OUTPATIENT)
Dept: OCCUPATIONAL THERAPY | Age: 63
Setting detail: THERAPIES SERIES
Discharge: HOME | End: 2023-11-10
Payer: COMMERCIAL

## 2023-11-10 PROCEDURE — 97535 SELF CARE MNGMENT TRAINING: CPT

## 2023-11-10 NOTE — FLOWSHEET NOTE
educated in clinic. Use pump in the morning rather than self MLD for 30 minutes, and pump at night for 1 hour. Use Velcro wrap during the day. [x] Progressing toward goals. [x] Patient would continue to benefit from skilled occupational therapy services in order to: Decrease edema that has accumulated in the extremity(ies), decrease risk of infection, improve limb ROM, increase ease and independence with ADL, educate on long term management of condition, and improve overall quality of life. [] No change. [] Treatment hold:   [] No further treatment/discharge  [] Other:      Pt. Education:  [x] Yes  [] No  [x] Reviewed Prior HEP/Ed  Method of Education: [] Verbal  [] Demo  [] Written  Comprehension of Education:  [x] Verbalizes/demonstrates understanding  [] Needs review  [] No understanding  Rehab potential:  [x] Good [] Fair [] Poor [] With assist from family/caregiver    Circumferential Measurements  Measurements taken from nail base of D3 digit. Fingers are measured at the base. Measurements (cm) cm Right Left   D1    6.4 6.1   D2     6.5 6.1   D3    6.2 6.5   D4    6.0 6.2   D5      5.3 5.3   Dorsum  11 20.0 19.0   Wrist  16 16.5 18.0   Distal Forearm  25 18.0 24.8   Proximal Forearm  34 24.0 29.0   Olecranon  40 24.0 27.5   Distal upper arm 48 27.5 31.0   Proximal upper arm 57 30.5 32.0   Initial Total 11/3/2023:   .9 211.5      11/3/2023 Increase of L UE compared to right at points measured 20.6cm      Therapy Goals  STG - To be addressed within 10 visits     Pt will be educated on lymphedema risk reduction practices to reduce the risk of infection, progression of disease, exacerbations, and functional morbidity with use of affected arm during ADL. Pt will demonstrate compliance with skin care routine for improved overall skin integrity to decrease risk of wounds, infection, and hospitalization.       Pt/caregiver will demonstrate proper technique with bandaging to aid in reduction of

## 2023-11-14 ENCOUNTER — HOSPITAL ENCOUNTER (OUTPATIENT)
Dept: OCCUPATIONAL THERAPY | Age: 63
Setting detail: THERAPIES SERIES
Discharge: HOME | End: 2023-11-14
Payer: COMMERCIAL

## 2023-11-14 PROCEDURE — 97140 MANUAL THERAPY 1/> REGIONS: CPT

## 2023-11-14 PROCEDURE — 97535 SELF CARE MNGMENT TRAINING: CPT

## 2023-11-14 NOTE — FLOWSHEET NOTE
TREATMENT LOCATION:   [] 400 [facility]  Outpatient Rehabilitation &  Therapy  [address], [address]  P: [phone]  F: [phone] [x] 910 [facility]   Outpatient Rehabilitation &  Therapy  [address]  P: [phone]  F: [phone] [] 1187 [facility].   P: [phone]  F: [phone]        Lymphedema Services - Treatment Note of the Upper Extremity Left    Visit# / total visits: 3/5 scheduled; Progress note due at visit 10 per POC.  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    11/3/2023 [employer] Benefit Check  2023 Send notes to [employer] for pump order  2023 Benefit Check [employer]       Overview of Evaluation dated 11/3/2023:Patient is a 63year old female referred to the Lymphedema Clinic with a diagnosis of left Upper Extremity  BRCA-related Lymphedema. Pt is s/p mastectomy, RT and chem from . Reprots node dissection of 23 nodes. Pt has been using pneumatic compression pump that is over 11years old and needs to be replaced. Despite use of current compression garments she continues to have edema to L UE.       Contraindications/Precautions: Standard L BRCA    ________________________________________________________________________    Pain: [] YES    [x] NO

## 2023-11-17 ENCOUNTER — HOSPITAL ENCOUNTER (OUTPATIENT)
Dept: OCCUPATIONAL THERAPY | Age: 63
Setting detail: THERAPIES SERIES
Discharge: HOME | End: 2023-11-17
Payer: COMMERCIAL

## 2023-11-17 PROCEDURE — 97140 MANUAL THERAPY 1/> REGIONS: CPT

## 2023-11-17 PROCEDURE — 97535 SELF CARE MNGMENT TRAINING: CPT

## 2023-11-17 NOTE — FLOWSHEET NOTE
TREATMENT LOCATION:   [] 400 [facility]  Outpatient Rehabilitation &  Therapy  [address]  P: [phone]  F: [phone] [x] 910 [facility]   Outpatient Rehabilitation &  Therapy  [address]  P: [phone]  F: [phone] [] [facility].   P: [phone]  F: [phone]        Lymphedema Services - Treatment Note of the Upper Extremity Left    Visit# / total visits: 4/10 scheduled; Progress note due at visit 10 per POC.  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 Pt is s/p mastectomy, RT and chem from . Reprots node dissection of 23 nodes. Pt has been using pneumatic compression pump that is over 11years old and needs to be replaced. Despite use of current compression garments she continues to have edema to L UE.       Contraindications/Precautions: Standard L BRCA    ________________________________________________________________________    Pain: [] YES    [x] NO

## 2023-11-21 ENCOUNTER — HOSPITAL ENCOUNTER (OUTPATIENT)
Dept: OCCUPATIONAL THERAPY | Age: 63
Setting detail: THERAPIES SERIES
Discharge: HOME | End: 2023-11-21
Payer: COMMERCIAL

## 2023-11-21 PROCEDURE — 97535 SELF CARE MNGMENT TRAINING: CPT

## 2023-11-21 PROCEDURE — 97140 MANUAL THERAPY 1/> REGIONS: CPT

## 2023-11-21 NOTE — FLOWSHEET NOTE
TREATMENT LOCATION:   [] 400 [facility]  Outpatient Rehabilitation &  Therapy  [address]  P: [phone]  F: [phone] [x] 910 [facility]   Outpatient Rehabilitation &  Therapy  [address]  P: [phone]  F: [phone] [] 7650 [facility].   P: [phone]  F: [phone]        Lymphedema Services - Treatment Note of the Upper Extremity Left    Visit# / total visits: scheduled; Progress note due at visit 10 per POC.  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    11/3/2023 [employer] Benefit Check  2023 Send notes to [employer] for pump order  2023 Benefit Check [employer]       Overview of Evaluation dated 11/3/2023:Patient is a 63year old female referred to the Lymphedema Clinic with a diagnosis of left Upper Extremity  BRCA-related Lymphedema. Pt is s/p mastectomy, RT and chem from . Reprots node dissection of 23 nodes. Pt has been using pneumatic compression pump that is over 11years old and needs to be replaced. Despite use of current compression garments she continues to have edema to L UE.       Contraindications/Precautions: Standard L BRCA    ________________________________________________________________________    Pain: [] YES    [x] NO

## 2023-11-28 ENCOUNTER — HOSPITAL ENCOUNTER (OUTPATIENT)
Dept: OCCUPATIONAL THERAPY | Age: 63
Setting detail: THERAPIES SERIES
Discharge: HOME | End: 2023-11-28
Payer: COMMERCIAL

## 2023-11-28 ENCOUNTER — TELEPHONE (OUTPATIENT)
Dept: PRIMARY CARE CLINIC | Age: 63
End: 2023-11-28

## 2023-11-28 PROCEDURE — 97535 SELF CARE MNGMENT TRAINING: CPT

## 2023-11-28 NOTE — FLOWSHEET NOTE
exacerbations, and functional morbidity with use of affected arm during ADL. Pt to be compliant with CDT in order to reduce edema in the L UE by 15+ cm total  for increased ease and independence with upper body ADL tasks. Pt will demonstrate independence with home programming including vasopneumatic pump, compression garments, skin care,  for improved QOL while managing chronic lymphatic impairment. Plan:   [x] Continue current frequency toward long and short term goals in order to meet the Patient's Goal: Get hand reduced and keep it reduced.   A glove and sleeve that works that does not bunch at her wrist.  [address] [place] [address] Drive       Treatment Charges   Minutes   Units   Re-evaluation (94225)               $65.97/$49.70                                                          Manual Therapy (01412):                                      $25.94/ $20.32     Therapeutic activities (13453):                             $35.10/ $25.06     Therapeutic Exercise (13772)                              $28.13/$ 21.75     Self care/home mgmt (86859)                              $31.17/ $23.27 60 4   Vasopneumatic Device (84463)                           $8.79     Total Billable Time    60 4       Time In:  0805  Time Out: 0958       Electronically signed by Refugio Moritz, OTD, BRODY/L, CLT-SARTHAK on 11/28/2023 at 8:02 AM

## 2023-11-28 NOTE — CARE COORDINATION
[] [address]: [phone]  F: [phone] [x] [address]: [phone]  F: [phone] [] 37[address] [facility].   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    Compression Garment Recommendation for Medical Management:  Garments for script: Custom flat knit sleeve and glove left arm   18-21 mmHg and  23-32 mmHg    Quantity: Unlimited      Physician Name (Please Print): _______________________________________    Physician Signature: ________________________________________    Date: ____________         Please print, sign and Fax paper copy to [phone].

## 2023-11-28 NOTE — TELEPHONE ENCOUNTER
Script for compression garments that patient dropped off were signed    Patient notified and verbalized understanding

## 2023-12-05 ENCOUNTER — APPOINTMENT (OUTPATIENT)
Dept: OCCUPATIONAL THERAPY | Age: 63
End: 2023-12-05
Payer: COMMERCIAL

## 2023-12-08 ENCOUNTER — HOSPITAL ENCOUNTER (OUTPATIENT)
Dept: OCCUPATIONAL THERAPY | Age: 63
Setting detail: THERAPIES SERIES
Discharge: HOME | End: 2023-12-08
Payer: COMMERCIAL

## 2023-12-08 PROCEDURE — 97535 SELF CARE MNGMENT TRAINING: CPT

## 2023-12-08 NOTE — FLOWSHEET NOTE
TREATMENT LOCATION:   [] 400 [facility]  Outpatient Rehabilitation &  Therapy  [address]  P: [phone]  F: [phone] [x] 910 [facility]   Outpatient Rehabilitation &  Therapy  [address]  P: [phone]  F: [phone] [] [facility].   P: [phone]  F: [phone]        Lymphedema Services - Treatment Note of the Upper Extremity Left    Visit# / total visits:  scheduled; Progress note due at visit 10 per POC.  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    11/3/2023 [employer] Benefit Check  2023 Send notes to [employer] for pump order  2023 Benefit Check [employer]   2023 Sent notes from this date to [employer]      Overview of Evaluation dated 11/3/2023:Patient is a 63year old female referred to the Lymphedema Clinic with a diagnosis of left Upper Extremity  BRCA-related Lymphedema. Pt is s/p mastectomy, RT and chem from . Reprots node dissection of 23 nodes. Pt has been using pneumatic compression pump that is over 11years old and needs to be replaced. Despite use of current compression garments she continues to have edema to L UE.       Contraindications/Precautions: Standard L

## 2023-12-12 ENCOUNTER — HOSPITAL ENCOUNTER (OUTPATIENT)
Dept: OCCUPATIONAL THERAPY | Age: 63
Setting detail: THERAPIES SERIES
Discharge: HOME | End: 2023-12-12
Payer: COMMERCIAL

## 2023-12-12 PROCEDURE — 97535 SELF CARE MNGMENT TRAINING: CPT

## 2023-12-12 NOTE — FLOWSHEET NOTE
12/8/2023 Increase of L UE compared to right at points measured 18.7cm  11/3/2023 Increase of L UE compared to right at points measured 20.6cm      Therapy Goals   STG - To be addressed within 10 visits     Pt will be educated on lymphedema risk reduction practices to reduce the risk of infection, progression of disease, exacerbations, and functional morbidity with use of affected arm during ADL. Pt will demonstrate compliance with skin care routine for improved overall skin integrity to decrease risk of wounds, infection, and hospitalization. Pt/caregiver will demonstrate proper technique with bandaging to aid in reduction of swelling for active participation in pt's POC. Pt will demonstrate independence with decongestive exercise program in order to promote healthy lymphatic flow by maintaining/improving functional ROM needed for ADL tasks. Pt will demonstrate 75% accuracy with SELF-MLD sequence in order to promote independence with home programming to functional reduce swelling to affected extremity for increased ease with UE ADL tasks. Pt will demonstrate independence with after breast surgery stretches (corner stretch, butterfly stretch, side arm stretch, etc.) in order to increase ease with AROM for reaching during ADL/IADL. LTG - To be addressed within 10 visits  Pt/Caregiver will demonstrate independence with donning/doffing and wearing schedule for compression garments/devices to reduce the risk of infection, progression of disease, exacerbations, and functional morbidity with use of affected arm during ADL. Pt to be compliant with CDT in order to reduce edema in the L UE by 15+ cm total  for increased ease and independence with upper body ADL tasks. Pt will demonstrate independence with home programming including vasopneumatic pump, compression garments, skin care,  for improved QOL while managing chronic lymphatic impairment.         Plan:   [x] Continue current

## 2023-12-26 ENCOUNTER — HOSPITAL ENCOUNTER (OUTPATIENT)
Dept: OCCUPATIONAL THERAPY | Age: 63
Setting detail: THERAPIES SERIES
Discharge: HOME | End: 2023-12-26
Payer: COMMERCIAL

## 2023-12-26 PROCEDURE — 97535 SELF CARE MNGMENT TRAINING: CPT

## 2023-12-26 NOTE — FLOWSHEET NOTE
proper technique with bandaging to aid in reduction of swelling for active participation in pt's POC. Pt will demonstrate independence with decongestive exercise program in order to promote healthy lymphatic flow by maintaining/improving functional ROM needed for ADL tasks. Pt will demonstrate 75% accuracy with SELF-MLD sequence in order to promote independence with home programming to functional reduce swelling to affected extremity for increased ease with UE ADL tasks. Pt will demonstrate independence with after breast surgery stretches (corner stretch, butterfly stretch, side arm stretch, etc.) in order to increase ease with AROM for reaching during ADL/IADL. LTG - To be addressed within 10 visits  Pt/Caregiver will demonstrate independence with donning/doffing and wearing schedule for compression garments/devices to reduce the risk of infection, progression of disease, exacerbations, and functional morbidity with use of affected arm during ADL. Pt to be compliant with CDT in order to reduce edema in the L UE by 15+ cm total  for increased ease and independence with upper body ADL tasks. Pt will demonstrate independence with home programming including vasopneumatic pump, compression garments, skin care,  for improved QOL while managing chronic lymphatic impairment. Plan:   [x] Continue current frequency toward long and short term goals in order to meet the Patient's Goal: Get hand reduced and keep it reduced.   A glove and sleeve that works that does not bunch at her wrist.  [address] [place] [address] Drive       Treatment Charges   Minutes   Units   Re-evaluation (78533)               $65.97/$49.70                                                          Manual Therapy (91800):                                      $25.94/ $20.32     Therapeutic activities (76354):                             $35.10/ $25.06     Therapeutic Exercise (03354)                              $28.13/$ 21.75     Self

## 2023-12-28 ENCOUNTER — HOSPITAL ENCOUNTER (OUTPATIENT)
Age: 63
Setting detail: SPECIMEN
Discharge: HOME | End: 2023-12-28

## 2023-12-28 ENCOUNTER — TELEPHONE (OUTPATIENT)
Dept: PRIMARY CARE CLINIC | Age: 63
End: 2023-12-28

## 2023-12-28 DIAGNOSIS — R79.89 ELEVATED FERRITIN: ICD-10-CM

## 2023-12-28 DIAGNOSIS — M25.532 LEFT WRIST PAIN: Primary | ICD-10-CM

## 2023-12-28 DIAGNOSIS — M06.9 RHEUMATOID ARTHRITIS INVOLVING BOTH HANDS, UNSPECIFIED WHETHER RHEUMATOID FACTOR PRESENT (HCC): ICD-10-CM

## 2023-12-28 LAB
FERRITIN SERPL-MCNC: 266 NG/ML (ref 13–150)
IRON SATN MFR SERPL: 18 % (ref 20–55)
IRON SERPL-MCNC: 47 UG/DL (ref 37–145)
TIBC SERPL-MCNC: 256 UG/DL (ref 250–450)
UNSATURATED IRON BINDING CAPACITY: 209 UG/DL (ref 112–347)

## 2023-12-28 NOTE — TELEPHONE ENCOUNTER
Patient would like pain management referral to have LDN injections done for rheumatoid arthritis in hands, thoracic back pain. Would prefer [address]     Patient would like a call once this is placed    Last Visit Date: 10/23/2023   Next Visit Date: 4/23/2024

## 2023-12-29 ENCOUNTER — HOSPITAL ENCOUNTER (OUTPATIENT)
Dept: OCCUPATIONAL THERAPY | Age: 63
Setting detail: THERAPIES SERIES
Discharge: HOME | End: 2023-12-29
Payer: COMMERCIAL

## 2023-12-29 PROCEDURE — 97535 SELF CARE MNGMENT TRAINING: CPT

## 2023-12-29 NOTE — PROGRESS NOTES
TREATMENT LOCATION:   [] 400 [facility]  Outpatient Rehabilitation &  Therapy  [address]  P: [phone]  F: [phone] [x] 910 [facility]   Outpatient Rehabilitation &  Therapy  [address]  P: [phone]  F: [phone] [] 164[address] [facility].   P: [phone]  F: [phone]        Lymphedema Services - Treatment Note of the Upper Extremity Left    Visit# / total visits: 10/11 scheduled; Progress note due at visit 20 per POC.  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    11/3/2023 [employer] Benefit Check  2023 Send notes to [employer] for pump order  2023 Benefit Check [employer]   2023 Sent notes from this date to [employer]  2023 [employer] \"She has a 0 out of pocket if we get this approved- we are just waiting on the letter of medical necessity before we can submit. We confirmed the office has it, just waiting on them to return it. \"        Overview of Evaluation dated 11/3/2023:Patient is a 63year old female referred to the Lymphedema Clinic with a diagnosis of left Upper Extremity  BRCA-related Lymphedema. Pt is s/p mastectomy, RT and chem from . Reprots node dissection of 23 nodes.   Pt has been using pneumatic compression pump that

## 2024-01-02 DIAGNOSIS — R79.89 ELEVATED FERRITIN: Primary | ICD-10-CM

## 2024-02-02 ENCOUNTER — TELEPHONE (OUTPATIENT)
Dept: ONCOLOGY | Age: 64
End: 2024-02-02

## 2024-02-02 ENCOUNTER — INITIAL CONSULT (OUTPATIENT)
Dept: ONCOLOGY | Age: 64
End: 2024-02-02
Payer: COMMERCIAL

## 2024-02-02 VITALS
RESPIRATION RATE: 18 BRPM | BODY MASS INDEX: 31.79 KG/M2 | SYSTOLIC BLOOD PRESSURE: 123 MMHG | HEIGHT: 65 IN | HEART RATE: 94 BPM | OXYGEN SATURATION: 97 % | WEIGHT: 190.8 LBS | TEMPERATURE: 97.1 F | DIASTOLIC BLOOD PRESSURE: 81 MMHG

## 2024-02-02 DIAGNOSIS — I89.0 LYMPHEDEMA OF LEFT ARM: ICD-10-CM

## 2024-02-02 DIAGNOSIS — R79.89 ELEVATED FERRITIN: Primary | ICD-10-CM

## 2024-02-02 DIAGNOSIS — C50.919 MALIGNANT NEOPLASM OF FEMALE BREAST, UNSPECIFIED ESTROGEN RECEPTOR STATUS, UNSPECIFIED LATERALITY, UNSPECIFIED SITE OF BREAST (HCC): ICD-10-CM

## 2024-02-02 DIAGNOSIS — M05.742 RHEUMATOID ARTHRITIS INVOLVING BOTH HANDS WITH POSITIVE RHEUMATOID FACTOR (HCC): ICD-10-CM

## 2024-02-02 DIAGNOSIS — M05.741 RHEUMATOID ARTHRITIS INVOLVING BOTH HANDS WITH POSITIVE RHEUMATOID FACTOR (HCC): ICD-10-CM

## 2024-02-02 PROCEDURE — 3074F SYST BP LT 130 MM HG: CPT | Performed by: INTERNAL MEDICINE

## 2024-02-02 PROCEDURE — 99245 OFF/OP CONSLTJ NEW/EST HI 55: CPT | Performed by: INTERNAL MEDICINE

## 2024-02-02 PROCEDURE — G8427 DOCREV CUR MEDS BY ELIG CLIN: HCPCS | Performed by: INTERNAL MEDICINE

## 2024-02-02 PROCEDURE — 3079F DIAST BP 80-89 MM HG: CPT | Performed by: INTERNAL MEDICINE

## 2024-02-02 PROCEDURE — G8417 CALC BMI ABV UP PARAM F/U: HCPCS | Performed by: INTERNAL MEDICINE

## 2024-02-02 PROCEDURE — G8484 FLU IMMUNIZE NO ADMIN: HCPCS | Performed by: INTERNAL MEDICINE

## 2024-02-02 NOTE — PROGRESS NOTES
Left breast cancer 2011, surgery, chemo, rad. Endocrine 8 years.  RA controlled  Screening labs     Easy brusing. Work up is neg      Left mastectomy and lymphedema    RA not controlled                                                                                            _           Ms. Gina Biggs is a very pleasant 63 y.o. female with history of multiple co morbidities as listed.  Patient is referred for evaluation and further management of elevated ferritin level.  Patient had history of left breast cancer in 2011.  She has mastectomy followed by chemotherapy followed by radiation therapy and she had endocrine therapy for 8 years.  She is in remission.  The patient is having rheumatoid arthritis on active treatment and recently it is not very well-controlled.  Different medications were tried to control her rheumatoid arthritis.  Patient had increasing weakness and fatigue and she had increasing aches and pains.  She had screening labs which showed elevated ferritin level.  Serum iron and TIBC are normal.  C-reactive protein is very high.  Patient is referred for further evaluation related to elevated ferritin level.  Patient takes multiple supplements but no iron.  Patient is not on any iron supplements.  She had no history of iron problems in the past.  No family history of hemochromatosis. Father had liver cancer.   Patient denies smoking.  Social alcohol drinking.      PAST MEDICAL HISTORY: has a past medical history of Cancer (HCC), Hypertension, Lymphedema, RA (rheumatoid arthritis) (HCC), and Trigger finger.    PAST SURGICAL HISTORY: has a past surgical history that includes Mastectomy; Breast surgery; Colonoscopy (N/A, 04/26/2022); Colonoscopy (N/A, 4/26/2022); and US BIOPSY LYMPH NODE (7/31/2023).     CURRENT MEDICATIONS:  has a current medication list which includes the following prescription(s): sulfasalazine, lisinopril, naproxen sodium, and hydroxychloroquine.    ALLERGIES:  has No Known

## 2024-03-04 NOTE — FLOWSHEET NOTE
[facility]        Discharge Summary    Patient Name: Juan Carlos Rodriguez  MRN: [id number]  YOB: 1984  PCP: Alexandro Lane MD  Admission Date: 3/3/2024  Discharge Date: 3/4/2024  Disposition: home  Admitting Diagnosis: Acute appendicitis with generalized peritonitis, without perforation or abscess, unspecified whether gangrene present [K35.200]  Acute appendicitis [K35.80]  Discharge Diagnosis:   Patient Active Problem List   Diagnosis    Oral herpes simplex infection    Chronic fatigue    Irregular menstrual bleeding    Tenosynovitis of thumb    Acute appendicitis with localized peritonitis    Acute appendicitis      Consultants: IP CONSULT TO GENERAL SURGERY    Procedures/Diagnostic Test(s):  CT ABDOMEN PELVIS W IV CONTRAST Additional Contrast? None   Final Result   Acute, uncomplicated appendicitis.  No free air or drainable fluid collection.      Results were called by Dr. Aicha Chambers MD to WHITLEY Pardo on   3/3/2024 at 12:57.         XR CHEST PORTABLE   Final Result   No acute process.             Discharge Condition: good    HOSPITAL COURSE: Juan Carlos originally presented to the hospital on 3/3/2024 10:20 AM with acute appendicitis by clinical exam and imaging.  She was given IV antibiotics, analgesics, and taken to the operating room for laparoscopic appendectomy.  Hospital course was uneventful.    Surgical pathology was pending at the time of discharge.    At time of discharge, Juan Carlos was tolerating a regular diet, had active bowel sounds, ambulating on her own accord and had adequate analgesia on oral pain medications, and had no signs of symptoms of complications.    PHYSICAL EXAMINATION:  Discharge Vitals:  height is 1.549 m (5' 1\") and weight is 56.2 kg (124 lb). Her oral temperature is 97.6 °F (36.4 °C). Her blood pressure is 94/64 and her pulse is 93. Her respiration is 14 and oxygen saturation is 99%.   General appearance - alert, well  TREATMENT LOCATION:   [] [address]: [phone]  F: [phone] [x] [address]: [phone]  F: [phone]        Lymphedema Services - Progress Note of the Upper Extremity    Date:  8/3/2021  Patient: Jen Raza  : 1960             MRN: [id number]  Referring Physician: Mily Morgan MD       Phone: [phone]  Fax: [phone]  Insurance:  [facility] - 60 visits per calendar year she has 54 left combined with SLP/PT no copay, no auth required. Medical Diagnosis: Postmastectomy lymphedema syndrome  Rehab Codes: I89.0  Onset Date: 2021  Visit# / total visits: 10/13  POC UPDATE DUE AT VISIT: 23    Plan for next session:   Determine need for additional compression items, provide with arm sleeve    Absolute Lymphedema Contraindications - treatement [x]? NONE    Absolute Contraindications Regarding the Deep Abdomen: [x]? NONE   Relative Lymphedema Contraindications [x]? NONE      Subjective: \" I am getting very frustrated about my hand, it is not going down, I feel like it is only getting bigger. \"      Pain: [] YES    [x] NO     Location: 0      Pain Rating: ( 0-10 scale) : 0/10  Comments:     Objective:   [x] Measurement [x] Bandaging [] MLD [] Skin care   [] Education [] Self-bandaging [x] Self-MLD [] Wound Care   [] Exercise [] Caregiver training [] Kinesiotaping [] Other:    [] Nutrition [x] Garment fitting/training [x] Vasopneumatic Pump        Circumferential Measurements   Measurements taken from nail base of D3 digit. Fingers are measured at the base.    Measurements (cm) Right Left   D1        D2         D3        D4        D5          Dorsum   11 20.0 21.8   Wrist   16 16.4 21.0   Lower Forearm  25 19.3 23.5   Upper Forearm  34 25.5 28.0   Olecranon   40 25.4 28.5   Lower Bicep  48 29.0 31.5   Upper Bicep  57 32.0 35.0   Current: High     Manual Therapy (39858):                                      $26.92 / $21.34 15 1   Therapeutic activities (20190):                             $37.46/ $26.79     Therapeutic Exercise (83807)                              $29.21/$ 22.84     Self care/home mgmt (22306)                              $32.39 / $24.43 40 3   Other: Vasopneumatic Pump     Total Treatment Time    55 4       Time In: 8:05 Time Out: 9:00      Electronically signed by Shira Weber OT on 8/3/2021 at 8:11 AM

## 2024-03-05 ENCOUNTER — APPOINTMENT (OUTPATIENT)
Dept: OCCUPATIONAL THERAPY | Age: 64
End: 2024-03-05
Payer: COMMERCIAL

## 2024-03-12 ENCOUNTER — HOSPITAL ENCOUNTER (OUTPATIENT)
Dept: OCCUPATIONAL THERAPY | Age: 64
Setting detail: THERAPIES SERIES
Discharge: HOME | End: 2024-03-12
Payer: COMMERCIAL

## 2024-03-12 PROCEDURE — 97535 SELF CARE MNGMENT TRAINING: CPT

## 2024-03-12 NOTE — PROGRESS NOTES
posterior trunk soft.  Pittine to dorsum but not forearm  12/8/2023 Edematous at lateral trunk and posterior trunk at axilla.  11/28/2023  min reduction despite increased therapeutic level of compression  11/21/2023 concerns for solid disease lymphedema of lymphedema-derived lipodystrophy and subcutaneous fibrosis given minimal response to compression and min pittingt o forearm  11/17/2023 dorsum of hand becomes discolored when edematous  11/15/2023 Moderate tissue density remains at arm with min reduction since increasing compression with home program  11/10/2023 Moderate to severe tissue density left forearm  possible solid state disease  11/3/2023 Dorsum left hand purplish in color resolves after compression off, pain in wrist with OA, soft tissue dorsum with good skin movement, moderate tissue density arm, pitting edema, edema to lateral trunk and axilla     Plan for next session:      Review all garments  Compression bandaging  Check pump benefits  Needs flat knit custom sleeve with a 3/4 length glove to avoid pain at wrist  Glove and sleeve that does not swell hand  Velcro on Tribute wrap to make it easier to gayle  Chip bag for dorsum of hand  Assess response to palm compression with gauntlet and ribbed foam under pump      Objective:   [x] Measurement [] Bandaging [] MLD [] Skin care   [x] Education [] Self-bandaging [] Self-MLD [] Wound Care   [] Exercise [] Caregiver training [] Kinesiotaping [] Other:    [] Nutrition [x] Garment fitting/training [] Vasopneumatic Pump         Assessment:     3/12/2024  ADL:  Measurements to assess response to HEP. Reduction of 8.4cm in L UE over baseline.  Fit and trained Reduction kit arm and hand piece.  Fit textured foam pad to dorsum of her hand  Educated on use of Medipor tape for fabricating her own compression pads for dorsum of hand.    Educated on the following products to use for home management with links on where to obtain lymphedema supplies including

## 2024-05-10 DIAGNOSIS — I10 PRIMARY HYPERTENSION: ICD-10-CM

## 2024-05-10 RX ORDER — LISINOPRIL 5 MG/1
5 TABLET ORAL DAILY
Qty: 90 TABLET | Refills: 0 | Status: SHIPPED | OUTPATIENT
Start: 2024-05-10

## 2024-06-04 ENCOUNTER — CLINICAL DOCUMENTATION (OUTPATIENT)
Dept: OCCUPATIONAL THERAPY | Age: 64
End: 2024-06-04

## 2024-06-04 NOTE — DISCHARGE SUMMARY
maintaining/improving functional ROM needed for ADL tasks.3/12/2024 Progressing     Pt will demonstrate 75% accuracy with SELF-MLD sequence in order to promote independence with home programming to functional reduce swelling to affected extremity for increased ease with UE ADL tasks.  12/29/2023 na using a LymphaPress pump     Pt will demonstrate independence with after breast surgery stretches (corner stretch, butterfly stretch, side arm stretch, etc.) in order to increase ease with AROM for reaching during ADL/IADL. 12/29/2023 Discontinue     LTG - To be addressed within 20 visits  Pt/Caregiver will demonstrate independence with donning/doffing and wearing schedule for compression garments/devices to reduce the risk of infection, progression of disease, exacerbations, and functional morbidity with use of affected arm during ADL. 3/12/2024 progressing awaiting custom Jobst sleeve and glove, needs new power cord for her pump     Pt to be compliant with CDT in order to reduce edema in the L UE by 15+ cm total  for increased ease and independence with upper body ADL tasks.  3/12/2024 Progressing reduction of 8.4cm over baseline.     Pt will demonstrate independence with home programming including vasopneumatic pump, compression garments, skin care,  for improved QOL while managing chronic lymphatic impairment. 3/12/2024 Progressing          Discharge Status:   [] Treatment goals were met.  [] Pt received maximum benefit. No further therapy indicated at this time.  [] Pt to continue exercise/home instructions independently  [x] Pt has not called or returned to clinic in over 90 days with additional concerns.   Comments:                      Electronically signed by MARINA Escobar OTR/L, CLT-SARTHAK  on 6/4/2024 at 4:13 PM    The patient is being discharged due to the status listed above. If patient would like to return to the clinic for additional therapy a new referral will be necessary. Thank you again for your

## 2024-07-10 SDOH — ECONOMIC STABILITY: INCOME INSECURITY: HOW HARD IS IT FOR YOU TO PAY FOR THE VERY BASICS LIKE FOOD, HOUSING, MEDICAL CARE, AND HEATING?: NOT VERY HARD

## 2024-07-10 SDOH — ECONOMIC STABILITY: FOOD INSECURITY: WITHIN THE PAST 12 MONTHS, YOU WORRIED THAT YOUR FOOD WOULD RUN OUT BEFORE YOU GOT MONEY TO BUY MORE.: NEVER TRUE

## 2024-07-10 SDOH — ECONOMIC STABILITY: FOOD INSECURITY: WITHIN THE PAST 12 MONTHS, THE FOOD YOU BOUGHT JUST DIDN'T LAST AND YOU DIDN'T HAVE MONEY TO GET MORE.: NEVER TRUE

## 2024-07-10 SDOH — ECONOMIC STABILITY: TRANSPORTATION INSECURITY
IN THE PAST 12 MONTHS, HAS LACK OF TRANSPORTATION KEPT YOU FROM MEETINGS, WORK, OR FROM GETTING THINGS NEEDED FOR DAILY LIVING?: NO

## 2024-07-10 ASSESSMENT — PATIENT HEALTH QUESTIONNAIRE - PHQ9
SUM OF ALL RESPONSES TO PHQ QUESTIONS 1-9: 0
1. LITTLE INTEREST OR PLEASURE IN DOING THINGS: NOT AT ALL
SUM OF ALL RESPONSES TO PHQ QUESTIONS 1-9: 0
SUM OF ALL RESPONSES TO PHQ9 QUESTIONS 1 & 2: 0
SUM OF ALL RESPONSES TO PHQ9 QUESTIONS 1 & 2: 0
2. FEELING DOWN, DEPRESSED OR HOPELESS: NOT AT ALL
1. LITTLE INTEREST OR PLEASURE IN DOING THINGS: NOT AT ALL
2. FEELING DOWN, DEPRESSED OR HOPELESS: NOT AT ALL

## 2024-07-11 ENCOUNTER — OFFICE VISIT (OUTPATIENT)
Dept: PRIMARY CARE CLINIC | Age: 64
End: 2024-07-11
Payer: COMMERCIAL

## 2024-07-11 VITALS
HEART RATE: 99 BPM | WEIGHT: 170 LBS | RESPIRATION RATE: 14 BRPM | SYSTOLIC BLOOD PRESSURE: 128 MMHG | BODY MASS INDEX: 28.29 KG/M2 | DIASTOLIC BLOOD PRESSURE: 88 MMHG | OXYGEN SATURATION: 92 %

## 2024-07-11 DIAGNOSIS — I10 PRIMARY HYPERTENSION: ICD-10-CM

## 2024-07-11 DIAGNOSIS — Z12.31 ENCOUNTER FOR SCREENING MAMMOGRAM FOR MALIGNANT NEOPLASM OF BREAST: ICD-10-CM

## 2024-07-11 DIAGNOSIS — H93.19 TINNITUS, UNSPECIFIED LATERALITY: ICD-10-CM

## 2024-07-11 DIAGNOSIS — I83.91 VARICOSE VEINS OF RIGHT LOWER EXTREMITY, UNSPECIFIED WHETHER COMPLICATED: ICD-10-CM

## 2024-07-11 DIAGNOSIS — M25.571 ACUTE RIGHT ANKLE PAIN: Primary | ICD-10-CM

## 2024-07-11 PROCEDURE — 3074F SYST BP LT 130 MM HG: CPT | Performed by: NURSE PRACTITIONER

## 2024-07-11 PROCEDURE — 3079F DIAST BP 80-89 MM HG: CPT | Performed by: NURSE PRACTITIONER

## 2024-07-11 PROCEDURE — 99214 OFFICE O/P EST MOD 30 MIN: CPT | Performed by: NURSE PRACTITIONER

## 2024-07-11 NOTE — PROGRESS NOTES
Standing Status:   Future     Standing Expiration Date:   9/11/2025    [facility] - Josiane Osborne MD, Vascular Surgery, [place]     Referral Priority:   Routine     Referral Type:   Eval and Treat     Referral Reason:   Specialty Services Required     Referred to Provider:   Josiane Osborne MD     Requested Specialty:   Vascular Surgery     Number of Visits Requested:   1    Beau Roth MD, Otolaryngology, [place]     Referral Priority:   Routine     Referral Type:   Eval and Treat     Referral Reason:   Specialty Services Required     Referred to Provider:   Beau Roth MD     Requested Specialty:   Otolaryngology     Number of Visits Requested:   1     No orders of the defined types were placed in this encounter.      Patient given educational materials - seepatient instructions.  Discussed use, benefit, and side effects of prescribed medications.All patient questions answered.  Pt voiced understanding. Reviewed health maintenance.Instructed to continue current medications, diet and exercise.  Patient agreedwith treatment plan. Follow up as directed.      Electronically signed by DOV Melgar CNP on 7/12/2024at 10:11 AM

## 2024-07-12 ENCOUNTER — TELEPHONE (OUTPATIENT)
Dept: ADMINISTRATIVE | Age: 64
End: 2024-07-12

## 2024-07-12 ASSESSMENT — ENCOUNTER SYMPTOMS
VOMITING: 0
SINUS PAIN: 0
ABDOMINAL PAIN: 0
CHEST TIGHTNESS: 0
EYE ITCHING: 0
DIARRHEA: 0
SORE THROAT: 0
SINUS PRESSURE: 0
EYE DISCHARGE: 0
SHORTNESS OF BREATH: 0
COUGH: 0
EYE REDNESS: 0
NAUSEA: 0
TROUBLE SWALLOWING: 0
WHEEZING: 0

## 2024-07-12 NOTE — TELEPHONE ENCOUNTER
LVM for patient to call back at [phone] option 3 to schedule new patient appointment. No additional needs.

## 2024-07-19 DIAGNOSIS — R79.89 ELEVATED FERRITIN: ICD-10-CM

## 2024-07-19 DIAGNOSIS — R79.89 ELEVATED LFTS: Primary | ICD-10-CM

## 2024-08-01 ENCOUNTER — HOSPITAL ENCOUNTER (OUTPATIENT)
Age: 64
Setting detail: SPECIMEN
Discharge: HOME | End: 2024-08-01

## 2024-08-01 DIAGNOSIS — R79.89 ELEVATED LFTS: ICD-10-CM

## 2024-08-01 DIAGNOSIS — R79.89 ELEVATED FERRITIN: ICD-10-CM

## 2024-08-01 LAB
ALBUMIN SERPL-MCNC: 4.1 G/DL (ref 3.5–5.2)
ALBUMIN/GLOB SERPL: 1 {RATIO} (ref 1–2.5)
ALP SERPL-CCNC: 104 U/L (ref 35–104)
ALT SERPL-CCNC: 13 U/L (ref 10–35)
ANION GAP SERPL CALCULATED.3IONS-SCNC: 15 MMOL/L (ref 9–16)
AST SERPL-CCNC: 24 U/L (ref 10–35)
BILIRUB SERPL-MCNC: 0.4 MG/DL (ref 0–1.2)
BUN SERPL-MCNC: 11 MG/DL (ref 8–23)
CALCIUM SERPL-MCNC: 9.2 MG/DL (ref 8.6–10.4)
CHLORIDE SERPL-SCNC: 98 MMOL/L (ref 98–107)
CO2 SERPL-SCNC: 25 MMOL/L (ref 20–31)
CREAT SERPL-MCNC: 0.9 MG/DL (ref 0.5–0.9)
ERYTHROCYTE [DISTWIDTH] IN BLOOD BY AUTOMATED COUNT: 14.3 % (ref 11.8–14.4)
FERRITIN SERPL-MCNC: 218 NG/ML (ref 13–150)
GFR, ESTIMATED: 74 ML/MIN/1.73M2
GLUCOSE SERPL-MCNC: 78 MG/DL (ref 74–99)
HCT VFR BLD AUTO: 40.2 % (ref 36.3–47.1)
HGB BLD-MCNC: 12.8 G/DL (ref 11.9–15.1)
IRON SATN MFR SERPL: 16 % (ref 20–55)
IRON SERPL-MCNC: 39 UG/DL (ref 37–145)
MCH RBC QN AUTO: 27.2 PG (ref 25.2–33.5)
MCHC RBC AUTO-ENTMCNC: 31.8 G/DL (ref 28.4–34.8)
MCV RBC AUTO: 85.5 FL (ref 82.6–102.9)
NRBC BLD-RTO: 0 PER 100 WBC
PLATELET # BLD AUTO: 261 K/UL (ref 138–453)
PMV BLD AUTO: 10.6 FL (ref 8.1–13.5)
POTASSIUM SERPL-SCNC: 3.9 MMOL/L (ref 3.7–5.3)
PROT SERPL-MCNC: 7.1 G/DL (ref 6.6–8.7)
RBC # BLD AUTO: 4.7 M/UL (ref 3.95–5.11)
SODIUM SERPL-SCNC: 138 MMOL/L (ref 136–145)
TIBC SERPL-MCNC: 243 UG/DL (ref 250–450)
UNSATURATED IRON BINDING CAPACITY: 204 UG/DL (ref 112–347)
WBC OTHER # BLD: 6.3 K/UL (ref 3.5–11.3)

## 2024-08-22 ENCOUNTER — OFFICE VISIT (OUTPATIENT)
Dept: PRIMARY CARE CLINIC | Age: 64
End: 2024-08-22
Payer: COMMERCIAL

## 2024-08-22 VITALS
RESPIRATION RATE: 14 BRPM | BODY MASS INDEX: 27.62 KG/M2 | OXYGEN SATURATION: 96 % | WEIGHT: 166 LBS | DIASTOLIC BLOOD PRESSURE: 88 MMHG | SYSTOLIC BLOOD PRESSURE: 126 MMHG | HEART RATE: 87 BPM

## 2024-08-22 DIAGNOSIS — M06.9 RHEUMATOID ARTHRITIS INVOLVING BOTH HANDS, UNSPECIFIED WHETHER RHEUMATOID FACTOR PRESENT (HCC): ICD-10-CM

## 2024-08-22 DIAGNOSIS — M25.571 ACUTE RIGHT ANKLE PAIN: ICD-10-CM

## 2024-08-22 DIAGNOSIS — R79.89 ELEVATED FERRITIN: Primary | ICD-10-CM

## 2024-08-22 DIAGNOSIS — I83.91 VARICOSE VEINS OF RIGHT LOWER EXTREMITY, UNSPECIFIED WHETHER COMPLICATED: ICD-10-CM

## 2024-08-22 DIAGNOSIS — I10 PRIMARY HYPERTENSION: ICD-10-CM

## 2024-08-22 PROCEDURE — 3079F DIAST BP 80-89 MM HG: CPT | Performed by: NURSE PRACTITIONER

## 2024-08-22 PROCEDURE — 99214 OFFICE O/P EST MOD 30 MIN: CPT | Performed by: NURSE PRACTITIONER

## 2024-08-22 PROCEDURE — 3074F SYST BP LT 130 MM HG: CPT | Performed by: NURSE PRACTITIONER

## 2024-08-22 ASSESSMENT — PATIENT HEALTH QUESTIONNAIRE - PHQ9
2. FEELING DOWN, DEPRESSED OR HOPELESS: NOT AT ALL
SUM OF ALL RESPONSES TO PHQ9 QUESTIONS 1 & 2: 0
SUM OF ALL RESPONSES TO PHQ QUESTIONS 1-9: 0
1. LITTLE INTEREST OR PLEASURE IN DOING THINGS: NOT AT ALL
SUM OF ALL RESPONSES TO PHQ QUESTIONS 1-9: 0

## 2024-08-22 NOTE — PROGRESS NOTES
[facility]  [facility] PRIMARY CARE  [address] [place] [address] [address]  [address]  [facility] [address]  Dept: [phone]  Dept Fax: [phone]    Gina Biggs is a 63 y.o. female who presents today for her medical conditions/complaintsas noted below.  Gnia Biggs is c/o of Ankle Pain (6 wk f/u, right ankle, didn't schedule MRI yet), Hypertension (6 wk f/u, stopped lisinopril), and Results (Discuss lab work and US of lower legs)    HPI:     Patient presents for a follow-up  Blood pressure stable  Weight is down 4 additional pounds    Patient presents for a follow-up for her ankle.  She continues to have ankle pain with mild swelling.  She is still able to ambulate.  She did not get the MRI done yet. she was never able to get through the line to discuss price. Still having ankle swelling and pain.       She continues to do well on her weight loss journey.  She is down another 4 pounds.  Her goal is 145 pounds.     Saw vascular. Us was negative. Saw NP. No further testing or procedures.     She is an appointment with rheumatology soon.  She will have lab work done beforehand        Past Medical History:   Diagnosis Date    Cancer (HCC)     Breast:  left mastectomy, chemo radiation    Hypertension     Lymphedema     left arm    Malignant neoplasm of breast (HCC) 10/11/2019    Malignant neoplasm of upper-outer quadrant of right female breast (HCC) 11/12/2020    RA (rheumatoid arthritis) (HCC)     Trigger finger       Past Surgical History:   Procedure Laterality Date    BREAST SURGERY      COLONOSCOPY N/A 04/26/2022     COLORECTAL CANCER SCREENING, NOT HIGH RISK (N/A )    COLONOSCOPY N/A 4/26/2022    COLORECTAL CANCER SCREENING, NOT HIGH RISK performed by Leisa Ibrahim MD at [facility] OR    MASTECTOMY      US LYMPH NODE BIOPSY  7/31/2023    US LYMPH NODE BIOPSY 7/31/2023 [facility] ULTRASOUND       Family History   Problem Relation Age of Onset    High Blood Pressure Mother     Cancer

## 2024-08-23 ENCOUNTER — HOSPITAL ENCOUNTER (OUTPATIENT)
Dept: MAMMOGRAPHY | Age: 64
Discharge: HOME | End: 2024-08-23
Payer: COMMERCIAL

## 2024-08-23 VITALS — HEIGHT: 64 IN | BODY MASS INDEX: 27.49 KG/M2 | WEIGHT: 161 LBS

## 2024-08-23 DIAGNOSIS — Z12.31 ENCOUNTER FOR SCREENING MAMMOGRAM FOR MALIGNANT NEOPLASM OF BREAST: ICD-10-CM

## 2024-08-23 PROCEDURE — 77063 BREAST TOMOSYNTHESIS BI: CPT

## 2024-08-24 PROBLEM — C50.411 MALIGNANT NEOPLASM OF UPPER-OUTER QUADRANT OF RIGHT FEMALE BREAST (HCC): Status: RESOLVED | Noted: 2020-11-12 | Resolved: 2024-08-24

## 2024-08-24 PROBLEM — C50.919 MALIGNANT NEOPLASM OF BREAST (HCC): Status: RESOLVED | Noted: 2019-10-11 | Resolved: 2024-08-24

## 2024-08-24 ASSESSMENT — ENCOUNTER SYMPTOMS
TROUBLE SWALLOWING: 0
VOMITING: 0
SINUS PRESSURE: 0
DIARRHEA: 0
EYE DISCHARGE: 0
COUGH: 0
WHEEZING: 0
CHEST TIGHTNESS: 0
SHORTNESS OF BREATH: 0
EYE ITCHING: 0
SORE THROAT: 0
NAUSEA: 0
EYE REDNESS: 0
ABDOMINAL PAIN: 0
SINUS PAIN: 0

## 2024-08-30 ENCOUNTER — HOSPITAL ENCOUNTER (OUTPATIENT)
Age: 64
Setting detail: SPECIMEN
Discharge: HOME | End: 2024-08-30

## 2024-08-30 DIAGNOSIS — R79.89 ELEVATED FERRITIN: ICD-10-CM

## 2024-08-30 LAB
CRP SERPL HS-MCNC: 22.4 MG/L (ref 0–5)
ERYTHROCYTE [SEDIMENTATION RATE] IN BLOOD BY PHOTOMETRIC METHOD: 24 MM/HR (ref 0–30)

## 2024-09-04 ENCOUNTER — PATIENT MESSAGE (OUTPATIENT)
Dept: PRIMARY CARE CLINIC | Age: 64
End: 2024-09-04

## 2024-09-05 LAB
C282Y HEMOCHROMATOSIS MUT: NEGATIVE
H63D HEMOCHROMATOSIS MUT: NORMAL
HEMOCHROMATOSIS MUTATION INT: NORMAL
HEMOCHROMATOSIS SPECIMEN: NORMAL
S65C HEMOCHROMATOSIS MUT: NEGATIVE

## 2024-09-30 ENCOUNTER — OFFICE VISIT (OUTPATIENT)
Dept: ONCOLOGY | Age: 64
End: 2024-09-30
Payer: COMMERCIAL

## 2024-09-30 ENCOUNTER — TELEPHONE (OUTPATIENT)
Dept: ONCOLOGY | Age: 64
End: 2024-09-30

## 2024-09-30 VITALS
RESPIRATION RATE: 18 BRPM | SYSTOLIC BLOOD PRESSURE: 142 MMHG | TEMPERATURE: 97.7 F | WEIGHT: 164.3 LBS | OXYGEN SATURATION: 93 % | BODY MASS INDEX: 28.2 KG/M2 | HEART RATE: 94 BPM | DIASTOLIC BLOOD PRESSURE: 87 MMHG

## 2024-09-30 DIAGNOSIS — M05.742 RHEUMATOID ARTHRITIS INVOLVING BOTH HANDS WITH POSITIVE RHEUMATOID FACTOR (HCC): ICD-10-CM

## 2024-09-30 DIAGNOSIS — M05.741 RHEUMATOID ARTHRITIS INVOLVING BOTH HANDS WITH POSITIVE RHEUMATOID FACTOR (HCC): ICD-10-CM

## 2024-09-30 DIAGNOSIS — R79.89 ELEVATED FERRITIN: Primary | ICD-10-CM

## 2024-09-30 DIAGNOSIS — C50.919 MALIGNANT NEOPLASM OF FEMALE BREAST, UNSPECIFIED ESTROGEN RECEPTOR STATUS, UNSPECIFIED LATERALITY, UNSPECIFIED SITE OF BREAST (HCC): ICD-10-CM

## 2024-09-30 DIAGNOSIS — I89.0 LYMPHEDEMA OF LEFT ARM: ICD-10-CM

## 2024-09-30 PROCEDURE — 99214 OFFICE O/P EST MOD 30 MIN: CPT | Performed by: INTERNAL MEDICINE

## 2024-10-24 ENCOUNTER — PATIENT MESSAGE (OUTPATIENT)
Dept: PRIMARY CARE CLINIC | Age: 64
End: 2024-10-24

## 2024-10-24 DIAGNOSIS — I89.0 LYMPHEDEMA OF LEFT ARM: Primary | ICD-10-CM

## 2024-10-29 ENCOUNTER — HOSPITAL ENCOUNTER (OUTPATIENT)
Age: 64
Setting detail: THERAPIES SERIES
Discharge: HOME | End: 2024-10-29

## 2024-10-29 NOTE — SIGNIFICANT EVENT
[][facility]  Outpatient Rehabilitation &  Therapy  [address], [address]  P: [phone]  F: [phone] [][facility]  Outpatient Rehabilitation &  Therapy  [address]  P: [phone]  F: [phone] [x][facility]  Outpatient Rehabilitation &  Therapy  [address]  P: [phone]  F: [phone]        Outpatient Lymphedema Occupational Therapy Cancel/No Show note    Date: 10/29/2024  Patient: Gina Biggs  : 1960  MRN: [id number]    Cancels/No Shows to date:     For today's appointment patient:    [x]  Cancelled    [] Rescheduled appointment    [] No-show     Reason given by patient:    []  Patient ill    []  Conflicting appointment    [] No transportation      [] Conflict with work    [] No reason given    [] Weather related    [] COVID-19    [x] Other       Comments: Patient thought she was coming to be fitted for custom compression sleeve. She has appointment at [facility] in 2 weeks and will go there for fitting. Per discussion with patient she does not feel she needs additional lymphedema treatment so evaluation this date was canceled.    Script request sent to referring provider and requested it be sent to [facility] directly.      [] Next appointment was confirmed    [] Left message informing of missed visit    [] Unable to contact    [x] Other: no further appointments needed at this time.           Electronically signed by: Rhoda Gallegos OT

## 2024-10-29 NOTE — CASE COMMUNICATION
[] [facility]  [address]  P: [phone]  F: [phone] [] [facility]   [address]  P: [phone]  F: [phone] [x] [facility]  Outpatient Rehabilitation &  Therapy  [address]   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    Compression Garment Recommendation for Medical Management:  Gradient compression sleeve, wrist to axilla, 20-30 mmHg  Gradient compression glove, 20-30 mmHg     Quantity: 3  Custom garments to be fitted    Daytime garments: 3 garments per affected limb or body part every 6 months   Nighttime garments: 2 garments per affected limb or body part every 2 years       Physician Name (Please Print): _______________________________________    Physician Signature: ________________________________________    Date: ____________         Please print, sign and Fax paper copy to: [facility]: [phone]

## 2024-11-25 ENCOUNTER — OFFICE VISIT (OUTPATIENT)
Dept: PRIMARY CARE CLINIC | Age: 64
End: 2024-11-25
Payer: COMMERCIAL

## 2024-11-25 VITALS
RESPIRATION RATE: 12 BRPM | OXYGEN SATURATION: 95 % | BODY MASS INDEX: 28.22 KG/M2 | WEIGHT: 164.4 LBS | DIASTOLIC BLOOD PRESSURE: 88 MMHG | SYSTOLIC BLOOD PRESSURE: 134 MMHG | HEART RATE: 106 BPM

## 2024-11-25 DIAGNOSIS — M66.869 TIBIALIS ANTERIOR TENDON TEAR, NONTRAUMATIC: Primary | ICD-10-CM

## 2024-11-25 DIAGNOSIS — M06.9 RHEUMATOID ARTHRITIS INVOLVING BOTH HANDS, UNSPECIFIED WHETHER RHEUMATOID FACTOR PRESENT (HCC): ICD-10-CM

## 2024-11-25 DIAGNOSIS — R79.89 ELEVATED FERRITIN: ICD-10-CM

## 2024-11-25 DIAGNOSIS — M25.471 ANKLE EFFUSION, RIGHT: ICD-10-CM

## 2024-11-25 DIAGNOSIS — M25.571 ACUTE RIGHT ANKLE PAIN: ICD-10-CM

## 2024-11-25 PROCEDURE — 99214 OFFICE O/P EST MOD 30 MIN: CPT | Performed by: NURSE PRACTITIONER

## 2024-11-25 ASSESSMENT — ENCOUNTER SYMPTOMS
COUGH: 0
EYE DISCHARGE: 0
SINUS PRESSURE: 0
SORE THROAT: 0
CHEST TIGHTNESS: 0
WHEEZING: 0
EYE ITCHING: 0
SINUS PAIN: 0
VOMITING: 0
EYE REDNESS: 0
TROUBLE SWALLOWING: 0
SHORTNESS OF BREATH: 0
ABDOMINAL PAIN: 0
NAUSEA: 0
DIARRHEA: 0

## 2024-11-25 NOTE — PROGRESS NOTES
[facility]  [facility] PRIMARY CARE  [address] [place] [address] DR  [address]  [facility] [address]  Dept: [phone]  Dept Fax: [phone]    Gina Biggs is a 64 y.o. female who presents today for her medical conditions/complaintsas noted below.  Gina Biggs is c/o of 3 Month Follow-Up and Results (Discuss ankle MRI results)    HPI:     Patient presents for an office visit  Blood pressure stable  Weight is stable    Patient presents for an office visit to discuss her MRI results.  She had her MRI done on the 21st.  This was done at the [facility].  We were able to get records today.  Her rheumatologist is also at the [facility].  She continues to have right ankle pain and swelling.  Her rheumatologist does want her to start a biologic which she does not want to do.  She is thinking about seeing a functional medicine specialist    Weight stable. Still would like to lose some more weight. She is still taking that supplement.                 Past Medical History:   Diagnosis Date    Cancer (HCC)     Breast:  left mastectomy, chemo radiation    Hypertension     Lymphedema     left arm    Malignant neoplasm of breast (HCC) 10/11/2019    Malignant neoplasm of upper-outer quadrant of right female breast (HCC) 11/12/2020    RA (rheumatoid arthritis) (HCC)     Trigger finger       Past Surgical History:   Procedure Laterality Date    BREAST SURGERY      COLONOSCOPY N/A 04/26/2022     COLORECTAL CANCER SCREENING, NOT HIGH RISK (N/A )    COLONOSCOPY N/A 4/26/2022    COLORECTAL CANCER SCREENING, NOT HIGH RISK performed by Leisa Ibrahim MD at [facility] OR    MASTECTOMY      US LYMPH NODE BIOPSY  7/31/2023    US LYMPH NODE BIOPSY 7/31/2023 [facility] ULTRASOUND       Family History   Problem Relation Age of Onset    High Blood Pressure Mother     Cancer Father         liver, lung and prostate    Other Sister         MS    Colon Cancer Maternal Grandmother        Social History

## 2024-12-23 ENCOUNTER — OFFICE VISIT (OUTPATIENT)
Dept: ORTHOPEDIC SURGERY | Age: 64
End: 2024-12-23

## 2024-12-23 VITALS — HEIGHT: 64 IN | BODY MASS INDEX: 27.14 KG/M2 | WEIGHT: 159 LBS | RESPIRATION RATE: 14 BRPM

## 2024-12-23 DIAGNOSIS — M19.071 ARTHRITIS OF RIGHT ANKLE: Primary | ICD-10-CM

## 2024-12-23 DIAGNOSIS — R52 PAIN: ICD-10-CM

## 2024-12-23 RX ORDER — BUPIVACAINE HYDROCHLORIDE 2.5 MG/ML
2 INJECTION, SOLUTION INFILTRATION; PERINEURAL ONCE
Status: COMPLETED | OUTPATIENT
Start: 2024-12-23 | End: 2024-12-23

## 2024-12-23 RX ORDER — METHYLPREDNISOLONE ACETATE 80 MG/ML
80 INJECTION, SUSPENSION INTRA-ARTICULAR; INTRALESIONAL; INTRAMUSCULAR; SOFT TISSUE ONCE
Status: COMPLETED | OUTPATIENT
Start: 2024-12-23 | End: 2024-12-23

## 2024-12-23 RX ADMIN — BUPIVACAINE HYDROCHLORIDE 5 MG: 2.5 INJECTION, SOLUTION INFILTRATION; PERINEURAL at 13:43

## 2024-12-23 RX ADMIN — METHYLPREDNISOLONE ACETATE 80 MG: 80 INJECTION, SUSPENSION INTRA-ARTICULAR; INTRALESIONAL; INTRAMUSCULAR; SOFT TISSUE at 13:43

## 2024-12-23 NOTE — PROGRESS NOTES
[facility]  [address]  [facility] [address]  Dept Phone: [phone]  Dept Fax: [phone]      Orthopaedic Trauma New Patient      CHIEF COMPLAINT:    Chief Complaint   Patient presents with    Leg Pain     Right tibialis anterior tendon tear       HISTORY OF PRESENT ILLNESS:    The patient is a 64 y.o. female who is being seen as a new patient for right ankle pain.  Patient has had ongoing right ankle pain for over a year.  She does have known rheumatoid arthritis.  She was seen by her PCP, where an MRI was ordered, which demonstrated degenerative changes throughout her tibiotalar and subtalar joints, with longitudinal tearing of the posterior tibialis tendon.  Patient was referred to me for evaluation.  Patient states that approximately 6-8 months ago she did have an injection into her ankle by a podiatrist, that provided her no relief.  She denies any injuries or falls.  She states her pain is localized globally throughout her ankle, but she notices swelling to the medial aspect of her ankle on occasion.  She also has right knee osteoarthritis, and does have occasional swelling into her leg.  She does have orthotics that were custom fit, and states that these do provide relief when she utilizes them.      Past Medical History:    Past Medical History:   Diagnosis Date    Cancer (HCC)     Breast:  left mastectomy, chemo radiation    Hypertension     Lymphedema     left arm    Malignant neoplasm of breast (HCC) 10/11/2019    Malignant neoplasm of upper-outer quadrant of right female breast (HCC) 11/12/2020    RA (rheumatoid arthritis) (HCC)     Trigger finger        Past Surgical History:    Past Surgical History:   Procedure Laterality Date    BREAST SURGERY      COLONOSCOPY N/A 04/26/2022     COLORECTAL CANCER SCREENING, NOT HIGH RISK (N/A )    COLONOSCOPY N/A 4/26/2022    COLORECTAL CANCER SCREENING, NOT HIGH RISK performed by Leisa Ibrahim MD at [facility] OR

## 2024-12-23 NOTE — PATIENT INSTRUCTIONS
No formal Orthopedic restrictions. Ice and elevate for pain and swelling. Ankle injections may be repeated every 4 months.   
yes

## 2025-02-21 ENCOUNTER — HOSPITAL ENCOUNTER (OUTPATIENT)
Age: 65
Setting detail: SPECIMEN
Discharge: HOME | End: 2025-02-21

## 2025-02-21 DIAGNOSIS — R79.89 ELEVATED FERRITIN: ICD-10-CM

## 2025-02-21 LAB
ALBUMIN SERPL-MCNC: 4.3 G/DL (ref 3.5–5.2)
ALP SERPL-CCNC: 119 U/L (ref 35–104)
ALT SERPL-CCNC: 18 U/L (ref 10–35)
AST SERPL-CCNC: 24 U/L (ref 10–35)
BILIRUB UR QL STRIP: NEGATIVE
CLARITY UR: CLEAR
COLOR UR: YELLOW
COMMENT: ABNORMAL
CREAT SERPL-MCNC: 0.8 MG/DL (ref 0.6–0.9)
CRP SERPL HS-MCNC: 6.2 MG/L (ref 0–5)
ERYTHROCYTE [DISTWIDTH] IN BLOOD BY AUTOMATED COUNT: 12.9 % (ref 11.8–14.4)
ERYTHROCYTE [DISTWIDTH] IN BLOOD BY AUTOMATED COUNT: 12.9 % (ref 11.8–14.4)
ERYTHROCYTE [SEDIMENTATION RATE] IN BLOOD BY PHOTOMETRIC METHOD: 24 MM/HR (ref 0–30)
FERRITIN SERPL-MCNC: 231 NG/ML
GFR, ESTIMATED: 83 ML/MIN/1.73M2
GLUCOSE UR STRIP-MCNC: NEGATIVE MG/DL
HCT VFR BLD AUTO: 40.6 % (ref 36.3–47.1)
HCT VFR BLD AUTO: 40.6 % (ref 36.3–47.1)
HGB BLD-MCNC: 12.6 G/DL (ref 11.9–15.1)
HGB BLD-MCNC: 12.6 G/DL (ref 11.9–15.1)
HGB UR QL STRIP.AUTO: NEGATIVE
IRON SATN MFR SERPL: 26 % (ref 20–55)
IRON SERPL-MCNC: 64 UG/DL (ref 37–145)
KETONES UR STRIP-MCNC: NEGATIVE MG/DL
LEUKOCYTE ESTERASE UR QL STRIP: NEGATIVE
MCH RBC QN AUTO: 28.9 PG (ref 25.2–33.5)
MCH RBC QN AUTO: 28.9 PG (ref 25.2–33.5)
MCHC RBC AUTO-ENTMCNC: 31 G/DL (ref 28.4–34.8)
MCHC RBC AUTO-ENTMCNC: 31 G/DL (ref 28.4–34.8)
MCV RBC AUTO: 93.1 FL (ref 82.6–102.9)
MCV RBC AUTO: 93.1 FL (ref 82.6–102.9)
NITRITE UR QL STRIP: NEGATIVE
NRBC BLD-RTO: 0 PER 100 WBC
NRBC BLD-RTO: 0 PER 100 WBC
PH UR STRIP: 7 [PH] (ref 5–8)
PLATELET # BLD AUTO: 240 K/UL (ref 138–453)
PLATELET # BLD AUTO: 240 K/UL (ref 138–453)
PMV BLD AUTO: 11.4 FL (ref 8.1–13.5)
PMV BLD AUTO: 11.4 FL (ref 8.1–13.5)
PROT UR STRIP-MCNC: NEGATIVE MG/DL
RBC # BLD AUTO: 4.36 M/UL (ref 3.95–5.11)
RBC # BLD AUTO: 4.36 M/UL (ref 3.95–5.11)
SP GR UR STRIP: 1 (ref 1–1.03)
TIBC SERPL-MCNC: 250 UG/DL (ref 250–450)
UNSATURATED IRON BINDING CAPACITY: 186 UG/DL (ref 112–347)
UROBILINOGEN UR STRIP-ACNC: NORMAL EU/DL (ref 0–1)
WBC OTHER # BLD: 7.7 K/UL (ref 3.5–11.3)
WBC OTHER # BLD: 7.7 K/UL (ref 3.5–11.3)

## 2025-06-19 ENCOUNTER — OFFICE VISIT (OUTPATIENT)
Dept: PRIMARY CARE CLINIC | Age: 65
End: 2025-06-19
Payer: COMMERCIAL

## 2025-06-19 VITALS
OXYGEN SATURATION: 92 % | RESPIRATION RATE: 14 BRPM | WEIGHT: 162.4 LBS | DIASTOLIC BLOOD PRESSURE: 82 MMHG | HEIGHT: 64 IN | SYSTOLIC BLOOD PRESSURE: 116 MMHG | HEART RATE: 110 BPM | BODY MASS INDEX: 27.72 KG/M2

## 2025-06-19 DIAGNOSIS — Z13.220 ENCOUNTER FOR LIPID SCREENING FOR CARDIOVASCULAR DISEASE: ICD-10-CM

## 2025-06-19 DIAGNOSIS — M05.742 RHEUMATOID ARTHRITIS INVOLVING BOTH HANDS WITH POSITIVE RHEUMATOID FACTOR (HCC): ICD-10-CM

## 2025-06-19 DIAGNOSIS — Z13.29 SCREENING FOR THYROID DISORDER: ICD-10-CM

## 2025-06-19 DIAGNOSIS — Z00.00 INITIAL MEDICARE ANNUAL WELLNESS VISIT: Primary | ICD-10-CM

## 2025-06-19 DIAGNOSIS — I10 PRIMARY HYPERTENSION: ICD-10-CM

## 2025-06-19 DIAGNOSIS — M05.741 RHEUMATOID ARTHRITIS INVOLVING BOTH HANDS WITH POSITIVE RHEUMATOID FACTOR (HCC): ICD-10-CM

## 2025-06-19 DIAGNOSIS — Z12.31 BREAST CANCER SCREENING BY MAMMOGRAM: ICD-10-CM

## 2025-06-19 DIAGNOSIS — Z13.6 ENCOUNTER FOR LIPID SCREENING FOR CARDIOVASCULAR DISEASE: ICD-10-CM

## 2025-06-19 PROCEDURE — 3079F DIAST BP 80-89 MM HG: CPT | Performed by: NURSE PRACTITIONER

## 2025-06-19 PROCEDURE — 3074F SYST BP LT 130 MM HG: CPT | Performed by: NURSE PRACTITIONER

## 2025-06-19 PROCEDURE — G0438 PPPS, INITIAL VISIT: HCPCS | Performed by: NURSE PRACTITIONER

## 2025-06-19 SDOH — ECONOMIC STABILITY: FOOD INSECURITY: WITHIN THE PAST 12 MONTHS, YOU WORRIED THAT YOUR FOOD WOULD RUN OUT BEFORE YOU GOT MONEY TO BUY MORE.: NEVER TRUE

## 2025-06-19 SDOH — ECONOMIC STABILITY: FOOD INSECURITY: WITHIN THE PAST 12 MONTHS, THE FOOD YOU BOUGHT JUST DIDN'T LAST AND YOU DIDN'T HAVE MONEY TO GET MORE.: NEVER TRUE

## 2025-06-19 ASSESSMENT — LIFESTYLE VARIABLES
HOW MANY STANDARD DRINKS CONTAINING ALCOHOL DO YOU HAVE ON A TYPICAL DAY: 1 OR 2
HOW OFTEN DO YOU HAVE A DRINK CONTAINING ALCOHOL: MONTHLY OR LESS

## 2025-06-19 ASSESSMENT — PATIENT HEALTH QUESTIONNAIRE - PHQ9
SUM OF ALL RESPONSES TO PHQ QUESTIONS 1-9: 0
2. FEELING DOWN, DEPRESSED OR HOPELESS: NOT AT ALL
1. LITTLE INTEREST OR PLEASURE IN DOING THINGS: NOT AT ALL
SUM OF ALL RESPONSES TO PHQ QUESTIONS 1-9: 0

## 2025-06-19 NOTE — PROGRESS NOTES
Medicare Annual Wellness Visit    Gina Biggs is here for Annual Exam    Assessment & Plan  1. Arthritis/RA  - Currently on hydroxychloroquine; sed rate and CRP levels have decreased, indicating improvement.  - Ferritin levels remain elevated but are not dangerously high.  - Comprehensive metabolic panel (CMP) will be ordered to monitor kidney, liver, and electrolyte levels.  - Thyroid and lipid panels will be ordered.  -stable. Follows with rheumatology.   On LDN from online company    2. Weight management.  - Weight has remained stable, with a slight increase of 3 pounds since the last visit; total weight loss of 40 pounds.  - Advised to continue weight loss efforts, aiming to lose an additional 10 pounds.  - Encouraged to increase protein intake and consider supplements such as biotin and collagen to address hair thinning associated with weight loss.    3. Pain management.  - Currently on low-dose naltrexone (LDN) for pain and immune modulation.  - Seeking a pain management specialist to discuss potential dosage adjustments.  - Referral to pain management providers will be made; if they do not manage LDN, referral to another provider will be arranged.    4. Health maintenance.  - Advised to follow up with gynecologist for a pelvic exam every 5 years.  - Mammogram will be ordered; instructed to fast for the cholesterol panel, allowing only water, black coffee, or unsweetened tea.    5. Suspected leaky gut syndrome.  - If she provides the name of a specific lab that can conduct the LPS test, it will be ordered.    Follow-up  - Follow up in 6 months.    Initial Medicare annual wellness visit  -Lab work ordered   Primary hypertension  -     Lipid Panel; Future  -     TSH reflex to FT4; Future  Encounter for lipid screening for cardiovascular disease  -     Lipid Panel; Future  Screening for thyroid disorder  -     TSH reflex to FT4; Future  Breast cancer screening by mammogram  -     [facility] DIGITAL SCREEN W OR  no

## 2025-06-21 PROBLEM — E66.9 ADIPOSITY: Status: RESOLVED | Noted: 2018-11-02 | Resolved: 2025-06-21

## 2025-06-21 PROBLEM — R59.1 LYMPHADENOPATHY: Status: RESOLVED | Noted: 2018-11-02 | Resolved: 2025-06-21

## 2025-08-22 ENCOUNTER — HOSPITAL ENCOUNTER (OUTPATIENT)
Age: 65
Setting detail: SPECIMEN
Discharge: HOME | End: 2025-08-22

## 2025-08-22 ENCOUNTER — TELEPHONE (OUTPATIENT)
Dept: PRIMARY CARE CLINIC | Age: 65
End: 2025-08-22

## 2025-08-22 DIAGNOSIS — Z13.29 SCREENING FOR THYROID DISORDER: ICD-10-CM

## 2025-08-22 DIAGNOSIS — I10 PRIMARY HYPERTENSION: ICD-10-CM

## 2025-08-22 DIAGNOSIS — E55.9 VITAMIN D DEFICIENCY: Primary | ICD-10-CM

## 2025-08-22 DIAGNOSIS — Z13.6 ENCOUNTER FOR LIPID SCREENING FOR CARDIOVASCULAR DISEASE: ICD-10-CM

## 2025-08-22 DIAGNOSIS — Z13.220 ENCOUNTER FOR LIPID SCREENING FOR CARDIOVASCULAR DISEASE: ICD-10-CM

## 2025-08-22 LAB
ALBUMIN SERPL-MCNC: 4.5 G/DL (ref 3.5–5.2)
ALP SERPL-CCNC: 126 U/L (ref 35–104)
ALT SERPL-CCNC: 15 U/L (ref 10–35)
AST SERPL-CCNC: 24 U/L (ref 10–35)
CHOLEST SERPL-MCNC: 164 MG/DL (ref 0–199)
CHOLESTEROL/HDL RATIO: 1.9
CREAT SERPL-MCNC: 0.7 MG/DL (ref 0.6–0.9)
CRP SERPL HS-MCNC: <3 MG/L (ref 0–5)
ERYTHROCYTE [DISTWIDTH] IN BLOOD BY AUTOMATED COUNT: 13.5 % (ref 11.8–14.4)
ERYTHROCYTE [SEDIMENTATION RATE] IN BLOOD BY PHOTOMETRIC METHOD: 25 MM/HR (ref 0–30)
GFR, ESTIMATED: >90 ML/MIN/1.73M2
HCT VFR BLD AUTO: 42.8 % (ref 36.3–47.1)
HDLC SERPL-MCNC: 85 MG/DL
HGB BLD-MCNC: 13.4 G/DL (ref 11.9–15.1)
LDLC SERPL CALC-MCNC: 65 MG/DL (ref 0–100)
MCH RBC QN AUTO: 28.2 PG (ref 25.2–33.5)
MCHC RBC AUTO-ENTMCNC: 31.3 G/DL (ref 28.4–34.8)
MCV RBC AUTO: 89.9 FL (ref 82.6–102.9)
NRBC BLD-RTO: 0 PER 100 WBC
PLATELET # BLD AUTO: 228 K/UL (ref 138–453)
PMV BLD AUTO: 10.9 FL (ref 8.1–13.5)
RBC # BLD AUTO: 4.76 M/UL (ref 3.95–5.11)
TRIGL SERPL-MCNC: 71 MG/DL
TSH SERPL DL<=0.05 MIU/L-ACNC: 1.97 UIU/ML (ref 0.27–4.2)
VLDLC SERPL CALC-MCNC: 14 MG/DL (ref 1–30)
WBC OTHER # BLD: 6.5 K/UL (ref 3.5–11.3)

## 2025-08-23 LAB — 25(OH)D3 SERPL-MCNC: >120 NG/ML (ref 30–100)

## (undated) DEVICE — TUBING, SUCTION, 3/16" X 10', STRAIGHT: Brand: MEDLINE

## (undated) DEVICE — Device: Brand: DEFENDO VALVE AND CONNECTOR KIT

## (undated) DEVICE — GOWN,POLY REINFORCED,LG: Brand: MEDLINE

## (undated) DEVICE — 4-PORT MANIFOLD: Brand: NEPTUNE 2

## (undated) DEVICE — PERRYSBURG ENDO PACK: Brand: MEDLINE INDUSTRIES, INC.

## (undated) DEVICE — CONNECTOR TBNG AUX H2O JET DISP FOR OLY 160/180 SER